# Patient Record
Sex: MALE | Race: WHITE | Employment: OTHER | URBAN - METROPOLITAN AREA
[De-identification: names, ages, dates, MRNs, and addresses within clinical notes are randomized per-mention and may not be internally consistent; named-entity substitution may affect disease eponyms.]

---

## 2017-02-06 ENCOUNTER — HOSPITAL ENCOUNTER (OUTPATIENT)
Dept: CT IMAGING | Age: 72
Discharge: HOME | End: 2017-02-06
Attending: NURSE PRACTITIONER
Payer: MEDICARE

## 2017-02-06 VITALS — HEIGHT: 68 IN | BODY MASS INDEX: 33.8 KG/M2 | WEIGHT: 223 LBS

## 2017-02-06 DIAGNOSIS — Z98.890 HISTORY OF LEFT-SIDED CAROTID ENDARTERECTOMY: Chronic | ICD-10-CM

## 2017-02-06 DIAGNOSIS — I65.21 ASYMPTOMATIC STENOSIS OF RIGHT CAROTID ARTERY WITHOUT INFARCTION: Chronic | ICD-10-CM

## 2017-02-06 LAB — CREAT BLD-MCNC: 1.2 MG/DL (ref 0.6–1)

## 2017-02-06 PROCEDURE — 74011000258 HC RX REV CODE- 258: Performed by: NURSE PRACTITIONER

## 2017-02-06 PROCEDURE — 70498 CT ANGIOGRAPHY NECK: CPT

## 2017-02-06 PROCEDURE — 74011636320 HC RX REV CODE- 636/320: Performed by: NURSE PRACTITIONER

## 2017-02-06 PROCEDURE — 82565 ASSAY OF CREATININE: CPT

## 2017-02-06 RX ORDER — SODIUM CHLORIDE 0.9 % (FLUSH) 0.9 %
10 SYRINGE (ML) INJECTION
Status: COMPLETED | OUTPATIENT
Start: 2017-02-06 | End: 2017-02-06

## 2017-02-06 RX ADMIN — IOPAMIDOL 80 ML: 755 INJECTION, SOLUTION INTRAVENOUS at 14:21

## 2017-02-06 RX ADMIN — Medication 10 ML: at 14:21

## 2017-02-06 RX ADMIN — SODIUM CHLORIDE 100 ML: 900 INJECTION, SOLUTION INTRAVENOUS at 14:21

## 2017-02-23 ENCOUNTER — ANESTHESIA EVENT (OUTPATIENT)
Dept: SURGERY | Age: 72
DRG: 039 | End: 2017-02-23
Payer: MEDICARE

## 2017-02-23 ENCOUNTER — HOSPITAL ENCOUNTER (OUTPATIENT)
Dept: SURGERY | Age: 72
Discharge: HOME | End: 2017-02-23
Payer: MEDICARE

## 2017-02-23 VITALS
HEART RATE: 69 BPM | DIASTOLIC BLOOD PRESSURE: 71 MMHG | WEIGHT: 224.5 LBS | OXYGEN SATURATION: 98 % | BODY MASS INDEX: 34.02 KG/M2 | HEIGHT: 68 IN | SYSTOLIC BLOOD PRESSURE: 102 MMHG | TEMPERATURE: 97.8 F | RESPIRATION RATE: 18 BRPM

## 2017-02-23 LAB
ANION GAP BLD CALC-SCNC: 12 MMOL/L (ref 7–16)
BUN SERPL-MCNC: 17 MG/DL (ref 8–23)
CALCIUM SERPL-MCNC: 9.7 MG/DL (ref 8.3–10.4)
CHLORIDE SERPL-SCNC: 100 MMOL/L (ref 98–107)
CO2 SERPL-SCNC: 29 MMOL/L (ref 21–32)
CREAT SERPL-MCNC: 1.25 MG/DL (ref 0.8–1.5)
ERYTHROCYTE [DISTWIDTH] IN BLOOD BY AUTOMATED COUNT: 12.4 % (ref 11.9–14.6)
GLUCOSE SERPL-MCNC: 89 MG/DL (ref 65–100)
HCT VFR BLD AUTO: 49.1 % (ref 41.1–50.3)
HGB BLD-MCNC: 17.8 G/DL (ref 13.6–17.2)
MCH RBC QN AUTO: 35 PG (ref 26.1–32.9)
MCHC RBC AUTO-ENTMCNC: 36.3 G/DL (ref 31.4–35)
MCV RBC AUTO: 96.5 FL (ref 79.6–97.8)
PLATELET # BLD AUTO: 162 K/UL (ref 150–450)
PMV BLD AUTO: 9 FL (ref 10.8–14.1)
POTASSIUM SERPL-SCNC: 4.2 MMOL/L (ref 3.5–5.1)
RBC # BLD AUTO: 5.09 M/UL (ref 4.23–5.67)
SODIUM SERPL-SCNC: 141 MMOL/L (ref 136–145)
WBC # BLD AUTO: 7.4 K/UL (ref 4.3–11.1)

## 2017-02-23 PROCEDURE — 85027 COMPLETE CBC AUTOMATED: CPT | Performed by: OBSTETRICS & GYNECOLOGY

## 2017-02-23 PROCEDURE — 36415 COLL VENOUS BLD VENIPUNCTURE: CPT | Performed by: OBSTETRICS & GYNECOLOGY

## 2017-02-23 PROCEDURE — 80048 BASIC METABOLIC PNL TOTAL CA: CPT | Performed by: OBSTETRICS & GYNECOLOGY

## 2017-02-23 NOTE — ANESTHESIA PREPROCEDURE EVALUATION
Anesthetic History   No history of anesthetic complications            Review of Systems / Medical History  Pertinent labs reviewed    Pulmonary        Sleep apnea: CPAP           Neuro/Psych              Cardiovascular    Hypertension: well controlled          Past MI, CAD, cardiac stents and CABG    Exercise tolerance: >4 METS  Comments: EF% 40 after last MI, 4 MI's in past   GI/Hepatic/Renal     GERD: well controlled           Endo/Other        Morbid obesity and arthritis     Other Findings              Physical Exam    Airway  Mallampati: II  TM Distance: > 6 cm  Neck ROM: normal range of motion   Mouth opening: Normal     Cardiovascular    Rhythm: regular           Dental  No notable dental hx       Pulmonary                 Abdominal         Other Findings            Anesthetic Plan    ASA: 3  Anesthesia type: general    Monitoring Plan: Arterial line      Induction: Intravenous  Anesthetic plan and risks discussed with: Patient

## 2017-02-23 NOTE — PERIOP NOTES
Patient verified name, , and surgery as listed in [facility]. TYPE  CASE:3  Orders per surgeon:  Received  Labs per surgeon:CBC,BMP, T/S: results pending   Labs per anesthesia protocol: CBC,BMP, T/S  : results pending   EKG  :  2016 , cath report 3/7/16, echo 3/7/16 all reviewed by Dr Laura Begum. Dr Sanchez Reasons in to see pt. Chart reviewed and pt approved for surgery. Called  Dr Sharmaine Damico nurse at [facility]. Message left for Plavix stop order and cardiac Clearance per Dr Vinayak Bray request. Pt states he has appointment with Dr Chio Escalante on 16. Patient provided with handouts including guide to surgery , transfusions, pain management and hand hygiene for the family and community. Pt verbalizes understanding of all pre-op instructions . Instructed that family must be present in building at all times. Hibiclens and instructions given per hospital policy. Instructed patient to continue  previous medications as prescribed prior to surgery and  to take the following medications the day of surgery according to anesthesia guidelines : ASA 81 mg, metoprolol, singular. Original medication prescription bottles not visualized during patient appointment. Continue all previous medications unless otherwise directed. Instructed patient to hold  the following medications prior to surgery: Plavix per order from Dr Chio Escalante. Dr Kwesi Bee office will need to notify patient. Multivitamin, Aleve, niacin, Fish oil. Patient verbalized understanding of all instructions and provided all medical/health information to the best of their ability.

## 2017-02-28 NOTE — PERIOP NOTES
Printed ov note/clearance from 2/27/17 from [facility] by Dr. Jorge Darby and placed on chart.

## 2017-03-02 ENCOUNTER — HOSPITAL ENCOUNTER (INPATIENT)
Age: 72
LOS: 1 days | Discharge: HOME | DRG: 039 | End: 2017-03-03
Attending: SURGERY | Admitting: SURGERY
Payer: MEDICARE

## 2017-03-02 ENCOUNTER — ANESTHESIA (OUTPATIENT)
Dept: SURGERY | Age: 72
DRG: 039 | End: 2017-03-02
Payer: MEDICARE

## 2017-03-02 PROBLEM — I65.21 CAROTID STENOSIS, RIGHT: Status: ACTIVE | Noted: 2017-03-02

## 2017-03-02 LAB
ABO + RH BLD: NORMAL
ANION GAP BLD CALC-SCNC: 8 MMOL/L (ref 7–16)
BLOOD GROUP ANTIBODIES SERPL: NORMAL
BUN SERPL-MCNC: 14 MG/DL (ref 8–23)
CALCIUM SERPL-MCNC: 8.5 MG/DL (ref 8.3–10.4)
CHLORIDE SERPL-SCNC: 109 MMOL/L (ref 98–107)
CO2 SERPL-SCNC: 27 MMOL/L (ref 21–32)
CREAT SERPL-MCNC: 1.14 MG/DL (ref 0.8–1.5)
ERYTHROCYTE [DISTWIDTH] IN BLOOD BY AUTOMATED COUNT: 12.4 % (ref 11.9–14.6)
GLUCOSE SERPL-MCNC: 118 MG/DL (ref 65–100)
HCT VFR BLD AUTO: 41.2 % (ref 41.1–50.3)
HGB BLD-MCNC: 14.6 G/DL (ref 13.6–17.2)
MAGNESIUM SERPL-MCNC: 2.1 MG/DL (ref 1.8–2.4)
MCH RBC QN AUTO: 33.5 PG (ref 26.1–32.9)
MCHC RBC AUTO-ENTMCNC: 35.4 G/DL (ref 31.4–35)
MCV RBC AUTO: 94.5 FL (ref 79.6–97.8)
PLATELET # BLD AUTO: 147 K/UL (ref 150–450)
PMV BLD AUTO: 8.7 FL (ref 10.8–14.1)
POTASSIUM SERPL-SCNC: 4 MMOL/L (ref 3.5–5.1)
RBC # BLD AUTO: 4.36 M/UL (ref 4.23–5.67)
SODIUM SERPL-SCNC: 144 MMOL/L (ref 136–145)
SPECIMEN EXP DATE BLD: NORMAL
WBC # BLD AUTO: 12 K/UL (ref 4.3–11.1)

## 2017-03-02 PROCEDURE — 74011250636 HC RX REV CODE- 250/636: Performed by: SURGERY

## 2017-03-02 PROCEDURE — 77030018824 HC SHNT CAR ARGY COVD -B: Performed by: SURGERY

## 2017-03-02 PROCEDURE — 77030014008 HC SPNG HEMSTAT J&J -C: Performed by: SURGERY

## 2017-03-02 PROCEDURE — 77030034850: Performed by: SURGERY

## 2017-03-02 PROCEDURE — 77030010507 HC ADH SKN DERMBND J&J -B: Performed by: SURGERY

## 2017-03-02 PROCEDURE — 03CH0ZZ EXTIRPATION OF MATTER FROM RIGHT COMMON CAROTID ARTERY, OPEN APPROACH: ICD-10-PCS | Performed by: SURGERY

## 2017-03-02 PROCEDURE — 77030002986 HC SUT PROL J&J -A: Performed by: SURGERY

## 2017-03-02 PROCEDURE — 77030031139 HC SUT VCRL2 J&J -A: Performed by: SURGERY

## 2017-03-02 PROCEDURE — 74011250636 HC RX REV CODE- 250/636

## 2017-03-02 PROCEDURE — 77030020782 HC GWN BAIR PAWS FLX 3M -B: Performed by: ANESTHESIOLOGY

## 2017-03-02 PROCEDURE — 03UH0KZ SUPPLEMENT RIGHT COMMON CAROTID ARTERY WITH NONAUTOLOGOUS TISSUE SUBSTITUTE, OPEN APPROACH: ICD-10-PCS | Performed by: SURGERY

## 2017-03-02 PROCEDURE — 03UK0KZ SUPPLEMENT RIGHT INTERNAL CAROTID ARTERY WITH NONAUTOLOGOUS TISSUE SUBSTITUTE, OPEN APPROACH: ICD-10-PCS | Performed by: SURGERY

## 2017-03-02 PROCEDURE — 77030002987 HC SUT PROL J&J -B: Performed by: SURGERY

## 2017-03-02 PROCEDURE — C1768 GRAFT, VASCULAR: HCPCS | Performed by: SURGERY

## 2017-03-02 PROCEDURE — 77030008477 HC STYL SATN SLP COVD -A: Performed by: ANESTHESIOLOGY

## 2017-03-02 PROCEDURE — 77010033678 HC OXYGEN DAILY

## 2017-03-02 PROCEDURE — 77030019908 HC STETH ESOPH SIMS -A: Performed by: ANESTHESIOLOGY

## 2017-03-02 PROCEDURE — 77030018673: Performed by: SURGERY

## 2017-03-02 PROCEDURE — 77030008703 HC TU ET UNCUF COVD -A: Performed by: ANESTHESIOLOGY

## 2017-03-02 PROCEDURE — 74011250636 HC RX REV CODE- 250/636: Performed by: ANESTHESIOLOGY

## 2017-03-02 PROCEDURE — 74011250637 HC RX REV CODE- 250/637: Performed by: SURGERY

## 2017-03-02 PROCEDURE — 76060000037 HC ANESTHESIA 3 TO 3.5 HR: Performed by: SURGERY

## 2017-03-02 PROCEDURE — 76010000173 HC OR TIME 3 TO 3.5 HR INTENSV-TIER 1: Performed by: SURGERY

## 2017-03-02 PROCEDURE — 03CM0ZZ EXTIRPATION OF MATTER FROM RIGHT EXTERNAL CAROTID ARTERY, OPEN APPROACH: ICD-10-PCS | Performed by: SURGERY

## 2017-03-02 PROCEDURE — 77030013292 HC BOWL MX PRSM J&J -A: Performed by: ANESTHESIOLOGY

## 2017-03-02 PROCEDURE — 74011000250 HC RX REV CODE- 250

## 2017-03-02 PROCEDURE — 03CK0ZZ EXTIRPATION OF MATTER FROM RIGHT INTERNAL CAROTID ARTERY, OPEN APPROACH: ICD-10-PCS | Performed by: SURGERY

## 2017-03-02 PROCEDURE — 77030013794 HC KT TRNSDUC BLD EDWD -B: Performed by: ANESTHESIOLOGY

## 2017-03-02 PROCEDURE — 77030010514 HC APPL CLP LIG COVD -B: Performed by: SURGERY

## 2017-03-02 PROCEDURE — 36600 WITHDRAWAL OF ARTERIAL BLOOD: CPT

## 2017-03-02 PROCEDURE — 85027 COMPLETE CBC AUTOMATED: CPT | Performed by: SURGERY

## 2017-03-02 PROCEDURE — 77030002933 HC SUT MCRYL J&J -A: Performed by: SURGERY

## 2017-03-02 PROCEDURE — 74011000250 HC RX REV CODE- 250: Performed by: SURGERY

## 2017-03-02 PROCEDURE — C1751 CATH, INF, PER/CENT/MIDLINE: HCPCS | Performed by: ANESTHESIOLOGY

## 2017-03-02 PROCEDURE — 80048 BASIC METABOLIC PNL TOTAL CA: CPT | Performed by: SURGERY

## 2017-03-02 PROCEDURE — 83735 ASSAY OF MAGNESIUM: CPT | Performed by: SURGERY

## 2017-03-02 PROCEDURE — 86900 BLOOD TYPING SEROLOGIC ABO: CPT | Performed by: SURGERY

## 2017-03-02 PROCEDURE — 74011250637 HC RX REV CODE- 250/637: Performed by: ANESTHESIOLOGY

## 2017-03-02 PROCEDURE — 77030034888 HC SUT PROL 2 J&J -B: Performed by: SURGERY

## 2017-03-02 PROCEDURE — 03UM0KZ SUPPLEMENT RIGHT EXTERNAL CAROTID ARTERY WITH NONAUTOLOGOUS TISSUE SUBSTITUTE, OPEN APPROACH: ICD-10-PCS | Performed by: SURGERY

## 2017-03-02 PROCEDURE — 77030002996 HC SUT SLK J&J -A: Performed by: SURGERY

## 2017-03-02 PROCEDURE — 65610000006 HC RM INTENSIVE CARE

## 2017-03-02 DEVICE — XENOSURE BIOLOGIC PATCH, 0.8CM X 8CM, EIFU
Type: IMPLANTABLE DEVICE | Site: NECK | Status: FUNCTIONAL
Brand: XENOSURE BIOLOGIC PATCH

## 2017-03-02 RX ORDER — LIDOCAINE HYDROCHLORIDE 10 MG/ML
INJECTION INFILTRATION; PERINEURAL AS NEEDED
Status: DISCONTINUED | OUTPATIENT
Start: 2017-03-02 | End: 2017-03-02 | Stop reason: HOSPADM

## 2017-03-02 RX ORDER — SPIRONOLACTONE 25 MG/1
25 TABLET ORAL DAILY
Status: DISCONTINUED | OUTPATIENT
Start: 2017-03-03 | End: 2017-03-03 | Stop reason: HOSPADM

## 2017-03-02 RX ORDER — SODIUM CHLORIDE 0.9 % (FLUSH) 0.9 %
5-10 SYRINGE (ML) INJECTION AS NEEDED
Status: DISCONTINUED | OUTPATIENT
Start: 2017-03-02 | End: 2017-03-02 | Stop reason: HOSPADM

## 2017-03-02 RX ORDER — ONDANSETRON 2 MG/ML
INJECTION INTRAMUSCULAR; INTRAVENOUS AS NEEDED
Status: DISCONTINUED | OUTPATIENT
Start: 2017-03-02 | End: 2017-03-02 | Stop reason: HOSPADM

## 2017-03-02 RX ORDER — NICARDIPINE HYDROCHLORIDE 0.1 MG/ML
5-15 INJECTION INTRAVENOUS
Status: DISCONTINUED | OUTPATIENT
Start: 2017-03-02 | End: 2017-03-03 | Stop reason: HOSPADM

## 2017-03-02 RX ORDER — MIDAZOLAM HYDROCHLORIDE 1 MG/ML
2 INJECTION, SOLUTION INTRAMUSCULAR; INTRAVENOUS ONCE
Status: DISCONTINUED | OUTPATIENT
Start: 2017-03-02 | End: 2017-03-02 | Stop reason: HOSPADM

## 2017-03-02 RX ORDER — SODIUM CHLORIDE, SODIUM LACTATE, POTASSIUM CHLORIDE, CALCIUM CHLORIDE 600; 310; 30; 20 MG/100ML; MG/100ML; MG/100ML; MG/100ML
100 INJECTION, SOLUTION INTRAVENOUS CONTINUOUS
Status: DISCONTINUED | OUTPATIENT
Start: 2017-03-02 | End: 2017-03-03 | Stop reason: HOSPADM

## 2017-03-02 RX ORDER — MIDAZOLAM HYDROCHLORIDE 1 MG/ML
2 INJECTION, SOLUTION INTRAMUSCULAR; INTRAVENOUS
Status: DISCONTINUED | OUTPATIENT
Start: 2017-03-02 | End: 2017-03-02 | Stop reason: HOSPADM

## 2017-03-02 RX ORDER — OXYCODONE AND ACETAMINOPHEN 7.5; 325 MG/1; MG/1
1 TABLET ORAL
Status: DISCONTINUED | OUTPATIENT
Start: 2017-03-02 | End: 2017-03-03 | Stop reason: HOSPADM

## 2017-03-02 RX ORDER — FENTANYL CITRATE 50 UG/ML
INJECTION, SOLUTION INTRAMUSCULAR; INTRAVENOUS AS NEEDED
Status: DISCONTINUED | OUTPATIENT
Start: 2017-03-02 | End: 2017-03-02 | Stop reason: HOSPADM

## 2017-03-02 RX ORDER — LIDOCAINE HYDROCHLORIDE 20 MG/ML
INJECTION, SOLUTION EPIDURAL; INFILTRATION; INTRACAUDAL; PERINEURAL AS NEEDED
Status: DISCONTINUED | OUTPATIENT
Start: 2017-03-02 | End: 2017-03-02 | Stop reason: HOSPADM

## 2017-03-02 RX ORDER — ACETAMINOPHEN 500 MG
500 TABLET ORAL
Status: DISCONTINUED | OUTPATIENT
Start: 2017-03-02 | End: 2017-03-03 | Stop reason: HOSPADM

## 2017-03-02 RX ORDER — ONDANSETRON 2 MG/ML
4 INJECTION INTRAMUSCULAR; INTRAVENOUS
Status: DISCONTINUED | OUTPATIENT
Start: 2017-03-02 | End: 2017-03-03 | Stop reason: HOSPADM

## 2017-03-02 RX ORDER — SODIUM CHLORIDE, SODIUM LACTATE, POTASSIUM CHLORIDE, CALCIUM CHLORIDE 600; 310; 30; 20 MG/100ML; MG/100ML; MG/100ML; MG/100ML
100 INJECTION, SOLUTION INTRAVENOUS CONTINUOUS
Status: DISCONTINUED | OUTPATIENT
Start: 2017-03-02 | End: 2017-03-02 | Stop reason: HOSPADM

## 2017-03-02 RX ORDER — SODIUM CHLORIDE 0.9 % (FLUSH) 0.9 %
5-10 SYRINGE (ML) INJECTION EVERY 8 HOURS
Status: DISCONTINUED | OUTPATIENT
Start: 2017-03-02 | End: 2017-03-02 | Stop reason: HOSPADM

## 2017-03-02 RX ORDER — FLUTICASONE PROPIONATE 50 MCG
2 SPRAY, SUSPENSION (ML) NASAL DAILY
Status: DISCONTINUED | OUTPATIENT
Start: 2017-03-03 | End: 2017-03-03 | Stop reason: HOSPADM

## 2017-03-02 RX ORDER — BUPIVACAINE HYDROCHLORIDE 2.5 MG/ML
INJECTION, SOLUTION EPIDURAL; INFILTRATION; INTRACAUDAL AS NEEDED
Status: DISCONTINUED | OUTPATIENT
Start: 2017-03-02 | End: 2017-03-02 | Stop reason: HOSPADM

## 2017-03-02 RX ORDER — OXYCODONE HYDROCHLORIDE 5 MG/1
5 TABLET ORAL
Status: DISCONTINUED | OUTPATIENT
Start: 2017-03-02 | End: 2017-03-02 | Stop reason: HOSPADM

## 2017-03-02 RX ORDER — DIPHENHYDRAMINE HYDROCHLORIDE 50 MG/ML
12.5 INJECTION, SOLUTION INTRAMUSCULAR; INTRAVENOUS
Status: DISCONTINUED | OUTPATIENT
Start: 2017-03-02 | End: 2017-03-02 | Stop reason: HOSPADM

## 2017-03-02 RX ORDER — HYDROCHLOROTHIAZIDE 25 MG/1
25 TABLET ORAL DAILY
Status: DISCONTINUED | OUTPATIENT
Start: 2017-03-03 | End: 2017-03-03 | Stop reason: HOSPADM

## 2017-03-02 RX ORDER — GUAIFENESIN 100 MG/5ML
81 LIQUID (ML) ORAL
Status: DISCONTINUED | OUTPATIENT
Start: 2017-03-02 | End: 2017-03-02 | Stop reason: SDUPTHER

## 2017-03-02 RX ORDER — HEPARIN SODIUM 5000 [USP'U]/ML
INJECTION, SOLUTION INTRAVENOUS; SUBCUTANEOUS AS NEEDED
Status: DISCONTINUED | OUTPATIENT
Start: 2017-03-02 | End: 2017-03-02 | Stop reason: HOSPADM

## 2017-03-02 RX ORDER — HEPARIN SODIUM 1000 [USP'U]/ML
INJECTION, SOLUTION INTRAVENOUS; SUBCUTANEOUS AS NEEDED
Status: DISCONTINUED | OUTPATIENT
Start: 2017-03-02 | End: 2017-03-02 | Stop reason: HOSPADM

## 2017-03-02 RX ORDER — ASPIRIN 81 MG/1
81 TABLET ORAL DAILY
Status: DISCONTINUED | OUTPATIENT
Start: 2017-03-03 | End: 2017-03-03 | Stop reason: HOSPADM

## 2017-03-02 RX ORDER — SODIUM CHLORIDE, SODIUM LACTATE, POTASSIUM CHLORIDE, CALCIUM CHLORIDE 600; 310; 30; 20 MG/100ML; MG/100ML; MG/100ML; MG/100ML
INJECTION, SOLUTION INTRAVENOUS
Status: DISCONTINUED | OUTPATIENT
Start: 2017-03-02 | End: 2017-03-02 | Stop reason: HOSPADM

## 2017-03-02 RX ORDER — PROPOFOL 10 MG/ML
INJECTION, EMULSION INTRAVENOUS AS NEEDED
Status: DISCONTINUED | OUTPATIENT
Start: 2017-03-02 | End: 2017-03-02 | Stop reason: HOSPADM

## 2017-03-02 RX ORDER — ROCURONIUM BROMIDE 10 MG/ML
INJECTION, SOLUTION INTRAVENOUS AS NEEDED
Status: DISCONTINUED | OUTPATIENT
Start: 2017-03-02 | End: 2017-03-02 | Stop reason: HOSPADM

## 2017-03-02 RX ORDER — NITROGLYCERIN 0.4 MG/1
0.4 TABLET SUBLINGUAL
Status: DISCONTINUED | OUTPATIENT
Start: 2017-03-02 | End: 2017-03-03 | Stop reason: HOSPADM

## 2017-03-02 RX ORDER — NALOXONE HYDROCHLORIDE 0.4 MG/ML
0.2 INJECTION, SOLUTION INTRAMUSCULAR; INTRAVENOUS; SUBCUTANEOUS AS NEEDED
Status: DISCONTINUED | OUTPATIENT
Start: 2017-03-02 | End: 2017-03-02 | Stop reason: HOSPADM

## 2017-03-02 RX ORDER — MAGNESIUM SULFATE 1 G/100ML
1 INJECTION INTRAVENOUS AS NEEDED
Status: DISCONTINUED | OUTPATIENT
Start: 2017-03-02 | End: 2017-03-03 | Stop reason: HOSPADM

## 2017-03-02 RX ORDER — FLUMAZENIL 0.1 MG/ML
0.2 INJECTION INTRAVENOUS
Status: DISCONTINUED | OUTPATIENT
Start: 2017-03-02 | End: 2017-03-02 | Stop reason: HOSPADM

## 2017-03-02 RX ORDER — CEFAZOLIN SODIUM IN 0.9 % NACL 2 G/50 ML
2 INTRAVENOUS SOLUTION, PIGGYBACK (ML) INTRAVENOUS ONCE
Status: COMPLETED | OUTPATIENT
Start: 2017-03-02 | End: 2017-03-02

## 2017-03-02 RX ORDER — FENTANYL CITRATE 50 UG/ML
100 INJECTION, SOLUTION INTRAMUSCULAR; INTRAVENOUS ONCE
Status: DISCONTINUED | OUTPATIENT
Start: 2017-03-02 | End: 2017-03-02 | Stop reason: HOSPADM

## 2017-03-02 RX ORDER — MORPHINE SULFATE 10 MG/ML
5 INJECTION, SOLUTION INTRAMUSCULAR; INTRAVENOUS
Status: DISCONTINUED | OUTPATIENT
Start: 2017-03-02 | End: 2017-03-03 | Stop reason: HOSPADM

## 2017-03-02 RX ORDER — HYDROMORPHONE HYDROCHLORIDE 1 MG/ML
1 INJECTION, SOLUTION INTRAMUSCULAR; INTRAVENOUS; SUBCUTANEOUS
Status: DISCONTINUED | OUTPATIENT
Start: 2017-03-02 | End: 2017-03-03 | Stop reason: HOSPADM

## 2017-03-02 RX ORDER — OXYCODONE HYDROCHLORIDE 5 MG/1
10 TABLET ORAL
Status: DISCONTINUED | OUTPATIENT
Start: 2017-03-02 | End: 2017-03-02 | Stop reason: HOSPADM

## 2017-03-02 RX ORDER — CEFAZOLIN SODIUM IN 0.9 % NACL 2 G/50 ML
2 INTRAVENOUS SOLUTION, PIGGYBACK (ML) INTRAVENOUS EVERY 8 HOURS
Status: COMPLETED | OUTPATIENT
Start: 2017-03-02 | End: 2017-03-03

## 2017-03-02 RX ORDER — ATROPA BELLADONNA AND OPIUM 16.2; 6 MG/1; MG/1
1 SUPPOSITORY RECTAL
Status: DISCONTINUED | OUTPATIENT
Start: 2017-03-02 | End: 2017-03-03 | Stop reason: HOSPADM

## 2017-03-02 RX ORDER — PROTAMINE SULFATE 10 MG/ML
INJECTION, SOLUTION INTRAVENOUS AS NEEDED
Status: DISCONTINUED | OUTPATIENT
Start: 2017-03-02 | End: 2017-03-02 | Stop reason: HOSPADM

## 2017-03-02 RX ORDER — MONTELUKAST SODIUM 10 MG/1
10 TABLET ORAL
Status: DISCONTINUED | OUTPATIENT
Start: 2017-03-02 | End: 2017-03-03 | Stop reason: HOSPADM

## 2017-03-02 RX ORDER — HYDROMORPHONE HYDROCHLORIDE 2 MG/ML
0.5 INJECTION, SOLUTION INTRAMUSCULAR; INTRAVENOUS; SUBCUTANEOUS
Status: DISCONTINUED | OUTPATIENT
Start: 2017-03-02 | End: 2017-03-02 | Stop reason: HOSPADM

## 2017-03-02 RX ORDER — SODIUM CHLORIDE 0.9 % (FLUSH) 0.9 %
5-10 SYRINGE (ML) INJECTION AS NEEDED
Status: DISCONTINUED | OUTPATIENT
Start: 2017-03-02 | End: 2017-03-03 | Stop reason: HOSPADM

## 2017-03-02 RX ORDER — BENZONATATE 100 MG/1
100 CAPSULE ORAL
Status: DISCONTINUED | OUTPATIENT
Start: 2017-03-02 | End: 2017-03-03 | Stop reason: HOSPADM

## 2017-03-02 RX ORDER — SODIUM CHLORIDE 0.9 % (FLUSH) 0.9 %
5-10 SYRINGE (ML) INJECTION EVERY 8 HOURS
Status: DISCONTINUED | OUTPATIENT
Start: 2017-03-02 | End: 2017-03-03 | Stop reason: HOSPADM

## 2017-03-02 RX ORDER — METOPROLOL TARTRATE 25 MG/1
25 TABLET, FILM COATED ORAL 2 TIMES DAILY
Status: DISCONTINUED | OUTPATIENT
Start: 2017-03-02 | End: 2017-03-03 | Stop reason: HOSPADM

## 2017-03-02 RX ORDER — ACETAMINOPHEN 500 MG
1000 TABLET ORAL ONCE
Status: COMPLETED | OUTPATIENT
Start: 2017-03-02 | End: 2017-03-02

## 2017-03-02 RX ORDER — DEXTROSE, SODIUM CHLORIDE, AND POTASSIUM CHLORIDE 5; .45; .15 G/100ML; G/100ML; G/100ML
125 INJECTION INTRAVENOUS CONTINUOUS
Status: DISCONTINUED | OUTPATIENT
Start: 2017-03-02 | End: 2017-03-03 | Stop reason: HOSPADM

## 2017-03-02 RX ORDER — NEOSTIGMINE METHYLSULFATE 1 MG/ML
INJECTION INTRAVENOUS AS NEEDED
Status: DISCONTINUED | OUTPATIENT
Start: 2017-03-02 | End: 2017-03-02 | Stop reason: HOSPADM

## 2017-03-02 RX ORDER — GLYCOPYRROLATE 0.2 MG/ML
INJECTION INTRAMUSCULAR; INTRAVENOUS AS NEEDED
Status: DISCONTINUED | OUTPATIENT
Start: 2017-03-02 | End: 2017-03-02 | Stop reason: HOSPADM

## 2017-03-02 RX ORDER — LIDOCAINE HYDROCHLORIDE 10 MG/ML
0.1 INJECTION INFILTRATION; PERINEURAL AS NEEDED
Status: DISCONTINUED | OUTPATIENT
Start: 2017-03-02 | End: 2017-03-02 | Stop reason: HOSPADM

## 2017-03-02 RX ORDER — NALOXONE HYDROCHLORIDE 0.4 MG/ML
0.4 INJECTION, SOLUTION INTRAMUSCULAR; INTRAVENOUS; SUBCUTANEOUS AS NEEDED
Status: DISCONTINUED | OUTPATIENT
Start: 2017-03-02 | End: 2017-03-03 | Stop reason: HOSPADM

## 2017-03-02 RX ORDER — DIPHENHYDRAMINE HCL 25 MG
50 CAPSULE ORAL
Status: DISCONTINUED | OUTPATIENT
Start: 2017-03-02 | End: 2017-03-03 | Stop reason: HOSPADM

## 2017-03-02 RX ADMIN — ONDANSETRON 4 MG: 2 INJECTION INTRAMUSCULAR; INTRAVENOUS at 11:00

## 2017-03-02 RX ADMIN — ROCURONIUM BROMIDE 50 MG: 10 INJECTION, SOLUTION INTRAVENOUS at 08:30

## 2017-03-02 RX ADMIN — FENTANYL CITRATE 25 MCG: 50 INJECTION, SOLUTION INTRAMUSCULAR; INTRAVENOUS at 09:33

## 2017-03-02 RX ADMIN — ROCURONIUM BROMIDE 5 MG: 10 INJECTION, SOLUTION INTRAVENOUS at 10:30

## 2017-03-02 RX ADMIN — SODIUM CHLORIDE, SODIUM LACTATE, POTASSIUM CHLORIDE, CALCIUM CHLORIDE: 600; 310; 30; 20 INJECTION, SOLUTION INTRAVENOUS at 09:00

## 2017-03-02 RX ADMIN — CEFAZOLIN 2 G: 1 INJECTION, POWDER, FOR SOLUTION INTRAMUSCULAR; INTRAVENOUS; PARENTERAL at 09:06

## 2017-03-02 RX ADMIN — PROPOFOL 200 MG: 10 INJECTION, EMULSION INTRAVENOUS at 08:30

## 2017-03-02 RX ADMIN — SODIUM CHLORIDE, SODIUM LACTATE, POTASSIUM CHLORIDE, AND CALCIUM CHLORIDE 100 ML/HR: 600; 310; 30; 20 INJECTION, SOLUTION INTRAVENOUS at 06:44

## 2017-03-02 RX ADMIN — Medication 10 ML: at 21:33

## 2017-03-02 RX ADMIN — HYDROMORPHONE HYDROCHLORIDE 1 MG: 1 INJECTION, SOLUTION INTRAMUSCULAR; INTRAVENOUS; SUBCUTANEOUS at 18:30

## 2017-03-02 RX ADMIN — Medication 10 ML: at 13:54

## 2017-03-02 RX ADMIN — GLYCOPYRROLATE 0.6 MG: 0.2 INJECTION INTRAMUSCULAR; INTRAVENOUS at 11:05

## 2017-03-02 RX ADMIN — MORPHINE SULFATE 5 MG: 10 INJECTION INTRAMUSCULAR; INTRAVENOUS; SUBCUTANEOUS at 13:53

## 2017-03-02 RX ADMIN — PROTAMINE SULFATE 50 MG: 10 INJECTION, SOLUTION INTRAVENOUS at 10:52

## 2017-03-02 RX ADMIN — SODIUM CHLORIDE, SODIUM LACTATE, POTASSIUM CHLORIDE, AND CALCIUM CHLORIDE: 600; 310; 30; 20 INJECTION, SOLUTION INTRAVENOUS at 10:55

## 2017-03-02 RX ADMIN — METOPROLOL TARTRATE 25 MG: 25 TABLET ORAL at 18:29

## 2017-03-02 RX ADMIN — OXYCODONE HYDROCHLORIDE AND ACETAMINOPHEN 1 TABLET: 7.5; 325 TABLET ORAL at 16:05

## 2017-03-02 RX ADMIN — FENTANYL CITRATE 100 MCG: 50 INJECTION, SOLUTION INTRAMUSCULAR; INTRAVENOUS at 08:30

## 2017-03-02 RX ADMIN — OXYCODONE HYDROCHLORIDE AND ACETAMINOPHEN 1 TABLET: 7.5; 325 TABLET ORAL at 23:41

## 2017-03-02 RX ADMIN — ACETAMINOPHEN 1000 MG: 500 TABLET, FILM COATED ORAL at 13:53

## 2017-03-02 RX ADMIN — MONTELUKAST SODIUM 10 MG: 10 TABLET, FILM COATED ORAL at 21:30

## 2017-03-02 RX ADMIN — FENTANYL CITRATE 50 MCG: 50 INJECTION, SOLUTION INTRAMUSCULAR; INTRAVENOUS at 09:21

## 2017-03-02 RX ADMIN — DEXTROSE MONOHYDRATE, SODIUM CHLORIDE, AND POTASSIUM CHLORIDE 125 ML/HR: 50; 4.5; 1.49 INJECTION, SOLUTION INTRAVENOUS at 13:52

## 2017-03-02 RX ADMIN — FENTANYL CITRATE 25 MCG: 50 INJECTION, SOLUTION INTRAMUSCULAR; INTRAVENOUS at 09:44

## 2017-03-02 RX ADMIN — NEOSTIGMINE METHYLSULFATE 4 MG: 1 INJECTION INTRAVENOUS at 11:05

## 2017-03-02 RX ADMIN — ROCURONIUM BROMIDE 10 MG: 10 INJECTION, SOLUTION INTRAVENOUS at 09:17

## 2017-03-02 RX ADMIN — FLUTICASONE PROPIONATE 2 SPRAY: 50 SPRAY, METERED NASAL at 18:29

## 2017-03-02 RX ADMIN — LIDOCAINE HYDROCHLORIDE 100 MG: 20 INJECTION, SOLUTION EPIDURAL; INFILTRATION; INTRACAUDAL; PERINEURAL at 08:30

## 2017-03-02 RX ADMIN — DEXTROSE MONOHYDRATE, SODIUM CHLORIDE, AND POTASSIUM CHLORIDE 125 ML/HR: 50; 4.5; 1.49 INJECTION, SOLUTION INTRAVENOUS at 22:25

## 2017-03-02 RX ADMIN — HEPARIN SODIUM 10000 UNITS: 1000 INJECTION, SOLUTION INTRAVENOUS; SUBCUTANEOUS at 09:57

## 2017-03-02 RX ADMIN — CEFAZOLIN 2 G: 1 INJECTION, POWDER, FOR SOLUTION INTRAMUSCULAR; INTRAVENOUS; PARENTERAL at 17:19

## 2017-03-02 NOTE — OP NOTES
[facility] 63   OPERATIVE REPORT       Name:  Aleksey Blackburn   MR#:  [id number]   :  1945   Account #:  [de-identified]   Date of Adm:  2017       DATE OF SURGERY: 2017    PREOPERATIVE DIAGNOSIS: Right internal carotid artery stenosis. POSTOPERATIVE DIAGNOSIS: Right internal carotid artery stenosis. NAME OF PROCEDURE: Right carotid endarterectomy. SURGEON: Javi Cooper. ASSISTANT: Priscilla Lim MD.    ANESTHESIA: General with local supplement. ESTIMATED BLOOD LOSS: 75 mL. SPECIMEN: None. STATEMENT OF MEDICAL NECESSITY: The patient is a 61-year-old man   who has previously undergone left carotid endarterectomy and   subsequent surveillance ultrasound shows severe progression of   stenosis on the right. This was recently confirmed with a CT   arteriogram that shows 90% right internal carotid artery   stenosis and no significant stenosis on the left. PROCEDURE: The patient was taken to the operating room and a   general anesthetic was administered. The right side of the neck   and the upper chest were prepped and draped in the usual sterile   fashion. An [place] cover was used. The skin was anesthetized over   the right sternocleidomastoid muscle and a standard longitudinal   cervical incision was made. The carotid sheath was exposed and   the common facial vein was divided between ligatures of 2-0 silk   reinforced with clips. The common internal and external carotid   arteries were carefully dissected and surrounded with umbilical   tapes. The vagus and hypoglossal nerves were carefully   identified and preserved. Heparin 100 units per kilogram body weight was given   intravenously and then 2 minutes later the internal, then the   external and then the common carotid arteries were occluded with   Rumels.  A longitudinal arteriotomy was made in the common   carotid artery with a #12 blade and then extended with Smiley   scissors through the plaque and into the normal distal right   internal carotid artery. The plaque was noted to be severely   stenotic and very heterogeneous with the calcified and soft   elements. Next, a 10-[place] shunt was gently inserted   into the internal carotid artery, back bled and then placed into   the common carotid artery. Patency of the shunt was confirmed   with continuous wave Doppler. The endarterectomy plane was   started in the deep layer of arterial wall media using an   elevator. The proximal endpoint was sharply transected, flushed   with the common carotid artery. The external carotid was cleared   with a standard eversion technique. The distal endpoint was   partially taken sharply with a curved microscissors flush with   the normal distal intima and then the rest of the endpoint was   feathered off. The distal endpoint was then secured and tacked   down with several interrupted 6-0 Prolene sutures. All loose   shards of atheromatous debris were then carefully removed with   microforceps. The endarterectomy surface was thoroughly irrigated with   heparinized saline to ensure completeness. The arteriotomy was   then repaired with a standard bovine pericardium patch,   anastomosed with 2 running 6-0 Prolene sutures. Before   completing the patch closure, the shunt was divided and then   removed from the internal and then the common carotid artery. The external was then briefly released to backbleed the   bifurcation area. The surface of the endarterectomy was then   thoroughly irrigated with heparinized saline. When the   anastomosis was completed, the external was again released to   backbleed the bifurcation and flow was first restored into the   external carotid artery by releasing the Rumel from the common   carotid artery. Several heartbeats later, the Rumel was released   from the right internal carotid artery.  At completion, there was   a normal palpable pulse, a normal external appearance, and a   normal continuous wave Doppler signal at the internal, external   and common carotid arteries, including where the Rumels had been   applied. Protamine was then given to reverse the remaining   heparin. The incision was thoroughly irrigated. Fibrillar was   used to assist with topical hemostasis. The incision was then closed in layers with running 3-0 Vicryl   in the carotid sheath, running 3-0 Vicryl in the platysma and   running 4-0 subcuticular Monocryl in the skin. Dermabond was   applied to the skin incision. The patient tolerated the procedure well and went to recovery in   stable condition.         MD Viri MccloudV[facility] / [facility]   D:  03/02/2017   11:22   T:  03/02/2017   15:10   Job #:  [id number]

## 2017-03-02 NOTE — PROGRESS NOTES
TRANSFER - IN REPORT:    Verbal report received from [facility] CRNA(name) on Justine Alcantar  being received from OR(unit) for routine progression of care      Report consisted of patients Situation, Background, Assessment and   Recommendations(SBAR). Information from the following report(s) SBAR, Kardex, OR Summary, Procedure Summary, Intake/Output, MAR, Recent Results and Cardiac Rhythm NSR was reviewed with the receiving nurse. Opportunity for questions and clarification was provided. Assessment completed upon patients arrival to unit and care assumed.

## 2017-03-02 NOTE — IP AVS SNAPSHOT
[facility] 
 
 
 2[address] [facility] 322 [facility] 
[phone] Patient: Maricarmen Serrano MRN: [id number] FRANCES:[birth year] You are allergic to the following Allergen Reactions Other Medication Other (comments) Seasonal allergies and swimming in lake water cause sinus congestion. Statins-Hmg-Coa Reductase Inhibitors Unknown (comments) Joint pain Recent Documentation Height  
  
  
  
  
  
 1.727 m Emergency Contacts Name Discharge Info Relation Home Work Mobile [facility] CAREGIVER [3] Spouse [3] [phone] About your hospitalization You were admitted on:  March 2, 2017 You last received care in the:  [facility] You were discharged on:  March 3, 2017 Unit phone number:  [phone] Why you were hospitalized Your primary diagnosis was:  Carotid Stenosis, Right Providers Seen During Your Hospitalizations Provider Role Specialty Primary office phone Rell Cheng MD Attending Provider Vascular Surgery [phone] Your Primary Care Physician (PCP) Primary Care Physician Office Phone Office Fax Vinod walden, [address] [place] [address] [phone] Follow-up Information Follow up With Details Comments Contact Info Jing Mcclain MD   [facility] [place] [address] [id number] 
[phone] Rell Cheng MD On 3/20/2017 at 2:00 PM [place] [address] [facility] [address] 
[phone] Jing Mcclain MD On 3/9/2017 at 11:45 AM [facility] [place] [phone] 
[phone] Your Appointments Monday March 20, 2017  2:00 PM EDT Global Post Op with Barbara Serrano NP  
[facility] [address] [place] [id number] [phone] Current Discharge Medication List  
  
 START taking these medications Dose & Instructions Dispensing Information Comments Morning Noon Evening Bedtime  
 oxyCODONE-acetaminophen 7.5-325 mg per tablet Commonly known as:  PERCOCET Your next dose is: Today, Tomorrow Other:  _________ Dose:  1 Tab Take 1 Tab by mouth every six (6) hours as needed for Pain. Max Daily Amount: 4 Tabs. Indications: Pain Quantity:  20 Tab Refills:  0 CONTINUE these medications which have NOT CHANGED Dose & Instructions Dispensing Information Comments Morning Noon Evening Bedtime ALEVE 220 mg Cap Generic drug:  naproxen sodium Your next dose is: Today, Tomorrow Other:  _________ Dose:  1 Tab Take 1 Tab by mouth every six (6) hours as needed. Refills:  0  
     
   
   
   
  
 aspirin 81 mg chewable tablet Your next dose is: Today, Tomorrow Other:  _________ Dose:  81 mg Take 81 mg by mouth nightly. Refills:  0  
     
   
   
   
  
 benzonatate 100 mg capsule Commonly known as:  TESSALON Your next dose is: Today, Tomorrow Other:  _________ Dose:  100 mg Take 100 mg by mouth three (3) times daily as needed for Cough. Refills:  0  
     
   
   
   
  
 clopidogrel 75 mg Tab Commonly known as:  PLAVIX Your next dose is: Today, Tomorrow Other:  _________ Dose:  75 mg Take 1 Tab by mouth daily. Quantity:  90 Tab Refills:  3  
     
   
   
   
  
 cpap machine kit Your next dose is: Today, Tomorrow Other:  _________ 40 cm qhs Refills:  0  
     
   
   
   
  
 fluticasone 50 mcg/actuation nasal spray Commonly known as:  Shelvia Robynks Your next dose is: Today, Tomorrow Other:  _________ Dose:  2 Spray 2 Sprays by Both Nostrils route daily. Refills:  0  
     
   
   
   
  
 hydroCHLOROthiazide 25 mg tablet Commonly known as:  HYDRODIURIL Your next dose is: Today, Tomorrow Other:  _________ Dose:  25 mg Take 1 Tab by mouth daily. Quantity:  90 Tab Refills:  3  
     
   
   
   
  
 metoprolol tartrate 25 mg tablet Commonly known as:  LOPRESSOR Your next dose is: Today, Tomorrow Other:  _________ Dose:  25 mg Take 1 Tab by mouth two (2) times a day. Quantity:  180 Tab Refills:  3  
     
   
   
   
  
 montelukast 10 mg tablet Commonly known as:  SINGULAIR Your next dose is: Today, Tomorrow Other:  _________ TAKE ONE TABLET BY MOUTH ONCE DAILY IN THE EVENING Quantity:  30 Tab Refills:  0  
     
   
   
   
  
 multivitamin tablet Commonly known as:  ONE A DAY Your next dose is: Today, Tomorrow Other:  _________ Dose:  1 Tab Take 1 Tab by mouth daily. Refills:  0  
     
   
   
   
  
 nitroglycerin 0.4 mg SL tablet Commonly known as:  NITROSTAT Your next dose is: Today, Tomorrow Other:  _________ Dose:  0.4 mg  
1 Tab by SubLINGual route every five (5) minutes as needed for Chest Pain. Quantity:  25 Tab Refills:  11 Omega-3-DHA-EPA-Fish Oil 1,000 mg (120 mg-180 mg) Cap Your next dose is: Today, Tomorrow Other:  _________ Dose:  1 Tab Take 1 Tab by mouth daily. Refills:  0 SLO-NIACIN 500 mg Tber Generic drug:  niacin ER Your next dose is: Today, Tomorrow Other:  _________ Take  by mouth. Refills:  0  
     
   
   
   
  
 spironolactone 25 mg tablet Commonly known as:  ALDACTONE Your next dose is: Today, Tomorrow Other:  _________ Dose:  25 mg Take 1 Tab by mouth daily. Quantity:  90 Tab Refills:  3 Where to Get Your Medications Information on where to get these meds will be given to you by the nurse or doctor. ! Ask your nurse or doctor about these medications  
  oxyCODONE-acetaminophen 7.5-325 mg per tablet Discharge Instructions DISCHARGE SUMMARY from Nurse The following personal items are in your possession at time of discharge: 
 
Dental Appliances: None Visual Aid: None Home Medications: None Jewelry: None Clothing: Footwear, Pants, Shirt Other Valuables: None PATIENT INSTRUCTIONS: 
 
 
F-face looks uneven A-arms unable to move or move unevenly S-speech slurred or non-existent T-time-call 911 as soon as signs and symptoms begin-DO NOT go Back to bed or wait to see if you get better-TIME IS BRAIN. Warning Signs of HEART ATTACK Call 911 if you have these symptoms: 
? Chest discomfort. Most heart attacks involve discomfort in the center of the chest that lasts more than a few minutes, or that goes away and comes back. It can feel like uncomfortable pressure, squeezing, fullness, or pain. ? Discomfort in other areas of the upper body. Symptoms can include pain or discomfort in one or both arms, the back, neck, jaw, or stomach. ? Shortness of breath with or without chest discomfort. ? Other signs may include breaking out in a cold sweat, nausea, or lightheadedness. Don't wait more than five minutes to call [address]! Fast action can save your life. Calling 911 is almost always the fastest way to get lifesaving treatment. Emergency Medical Services staff can begin treatment when they arrive  up to an hour sooner than if someone gets to the hospital by car. The discharge information has been reviewed with the patient. The patient verbalized understanding. Discharge medications reviewed with the patient and appropriate educational materials and side effects teaching were provided. Resume Plavix (clopidogrel) in 2 days. Carotid Endarterectomy: What to Expect at [facility] Your Recovery A carotid endarterectomy (say \"hugo-RAW-tid vz-klx-sro-REK-tuh-luis e\") is surgery to remove fatty build-up (plaque) from one of the carotid arteries. There are two carotid arteriesone on each side of the neckthat supply blood to the brain. When plaque builds up in either artery, it can make it hard for blood to flow to the brain. This surgery may lower your risk of having a stroke. You may have a sore throat for a few days. You can expect the cut (incision) in your neck to be sore for about a week. The area around the incision may also be swollen and bruised at first. The area in front of the incision may be numb. This usually gets better after 6 to 12 months. Your doctor closed the incision in your neck with stitches. The stitches will be removed 7 to 10 days after surgery, or you may have stitches that dissolve on their own. You may feel more tired than usual for several weeks after surgery. You will probably be able to go back to work or your usual activities in 1 to 2 weeks. This care sheet gives you a general idea about how long it will take for you to recover. But each person recovers at a different pace. Follow the steps below to feel better as quickly as possible. How can you care for yourself at home? Activity · Rest when you feel tired. Getting enough sleep will help you recover. · Try to walk each day. Start by walking a little more than you did the day before. Bit by bit, increase the amount you walk. Walking boosts blood flow and helps prevent pneumonia and constipation. · Avoid strenuous activities, such as bicycle riding, jogging, weight lifting, or aerobic exercise. Your doctor will tell you when it's okay to do strenuous activity. · For 1 to 2 weeks, avoid lifting anything that would make you strain. This may include a child, heavy grocery bags and milk containers, a heavy briefcase or backpack, cat litter or dog food bags, or a vacuum . · Ask your doctor when you can drive again. · You will probably need to take 1 to 2 weeks off from work. It depends on the type of work you do and how you feel. · You may shower and take baths as usual. But do not soak the incision for the first 2 weeks, or until your doctor tells you it is okay. Pat the incision dry. · Your doctor will tell you when you can have sex again. Diet · You can eat your normal diet. If your stomach is upset, try bland, low-fat foods like plain rice, broiled chicken, toast, and yogurt. · Drink plenty of fluids (unless your doctor tells you not to). · You may notice that your bowel movements are not regular right after your surgery. This is common. Try to avoid constipation and straining with bowel movements. You may want to take a fiber supplement every day. If you have not had a bowel movement after a couple of days, ask your doctor about taking a mild laxative. Medicines · Your doctor will tell you if and when you can restart your medicines. He or she will also give you instructions about taking any new medicines. · If you take blood thinners, such as warfarin (Coumadin), clopidogrel (Plavix), or aspirin, be sure to talk to your doctor. He or she will tell you if and when to start taking those medicines again. Make sure that you understand exactly what your doctor wants you to do. · Your doctor may advise you to take aspirin when you go home. This helps prevent blood clots. Take your medicines exactly as prescribed. Call your doctor if you think you are having a problem with your medicine. · Be safe with medicines. Take pain medicines exactly as directed. ¨ If the doctor gave you a prescription medicine for pain, take it as prescribed. ¨ If you are not taking a prescription pain medicine, ask your doctor if you can take an over-the-counter medicine. ¨ Do not take two or more pain medicines at the same time unless the doctor told you to. Many pain medicines have acetaminophen, which is Tylenol. Too much acetaminophen (Tylenol) can be harmful. · If you think your pain medicine is making you sick to your stomach: 
¨ Take your medicine after meals (unless your doctor has told you not to). ¨ Ask your doctor for a different pain medicine. · If your doctor prescribed antibiotics, take them as directed. Do not stop taking them just because you feel better. You need to take the full course of antibiotics. · If you take a blood thinner, such as aspirin, be sure you get instructions about how to take your medicine safely. Blood thinners can cause serious bleeding problems. Incision care · If you have strips of tape over your incision, leave the tape on for a week or until it falls off. · Wash the area daily with water and pat it dry. Other cleaning products, such as hydrogen peroxide, can make the wound heal more slowly. You may cover the area with a gauze bandage if it weeps or rubs against clothing. Change the bandage every day. · Keep the area clean and dry. Follow-up care is a key part of your treatment and safety. Be sure to make and go to all appointments, and call your doctor if you are having problems. It's also a good idea to know your test results and keep a list of the medicines you take. When should you call for help? Call 911 anytime you think you may need emergency care. For example, call if: 
· You passed out (lost consciousness). · You have severe trouble breathing. · You have a tight bulge in your neck on the side where the surgery was done. · You have symptoms of a stroke. These may include: 
¨ Sudden numbness, tingling, weakness, or loss of movement in your face, arm, or leg, especially on only one side of your body. ¨ Sudden vision changes. ¨ Sudden trouble speaking. ¨ Sudden confusion or trouble understanding simple statements. ¨ Sudden problems with walking or balance. ¨ A sudden, severe headache that is different from past headaches. · You have chest pain or pressure. This may occur with: ¨ Sweating. ¨ Shortness of breath. ¨ Nausea or vomiting. ¨ Pain that spreads from the chest to the neck, jaw, or one or both shoulders or arms. ¨ Dizziness or lightheadedness. ¨ A fast or uneven pulse. After calling 911, chew 1 adult-strength or 2 to 4 low-dose aspirin. Wait for an ambulance. Do not try to drive yourself. Call your doctor now or seek immediate medical care if: 
· You are sick to your stomach or cannot keep fluids down. · You have pain that does not get better after you take pain medicine. · You have a fever over 100°F. 
· You have loose stitches, or your incision comes open. · Bright red blood has soaked through the bandage over your incision. · You have signs of infection, such as: 
¨ Increased pain, swelling, warmth, or redness. ¨ Red streaks leading from the incisions. ¨ Pus draining from the incisions. ¨ Swollen lymph nodes in your neck, armpits, or groin. ¨ A fever. Watch closely for any changes in your health, and be sure to contact your doctor if you have any problems. Where can you learn more? Go to http://arturo-sherice.info/. Enter Z493 in the search box to learn more about \"Carotid Endarterectomy: What to Expect at Home. \" Current as of: January 27, 2016 Content Version: 11.1 © 3145-9039 [employer]. Care instructions adapted under license by [employer] (which disclaims liability or warranty for this information). If you have questions about a medical condition or this instruction, always ask your healthcare professional. [address] any warranty or liability for your use of this information. Heart-Healthy Diet: Care Instructions Your Care Instructions A heart-healthy diet has lots of vegetables, fruits, nuts, beans, and whole grains, and is low in salt. It limits foods that are high in saturated fat, such as meats, cheeses, and fried foods. It may be hard to change your diet, but even small changes can lower your risk of heart attack and heart disease. Follow-up care is a key part of your treatment and safety. Be sure to make and go to all appointments, and call your doctor if you are having problems. It's also a good idea to know your test results and keep a list of the medicines you take. How can you care for yourself at home? Watch your portions · Learn what a serving is. A \"serving\" and a \"portion\" are not always the same thing. Make sure that you are not eating larger portions than are recommended. For example, a serving of pasta is ½ cup. A serving size of meat is 2 to 3 ounces. A 3-ounce serving is about the size of a deck of cards. Measure serving sizes until you are good at [place]" them. Keep in mind that restaurants often serve portions that are 2 or 3 times the size of one serving. · To keep your energy level up and keep you from feeling hungry, eat often but in smaller portions. · Eat only the number of calories you need to stay at a healthy weight. If you need to lose weight, eat fewer calories than your body burns (through exercise and other physical activity). Eat more fruits and vegetables · Eat a variety of fruit and vegetables every day. Dark green, deep orange, red, or yellow fruits and vegetables are especially good for you. Examples include spinach, carrots, peaches, and berries. · Keep carrots, celery, and other veggies handy for snacks. Buy fruit that is in season and store it where you can see it so that you will be tempted to eat it. · Cook dishes that have a lot of veggies in them, such as stir-fries and soups. Limit saturated and trans fat · Read food labels, and try to avoid saturated and trans fats. They increase your risk of heart disease. Trans fat is found in many processed foods such as cookies and crackers. · Use olive or canola oil when you cook. Try cholesterol-lowering spreads, such as Benecol or Take Control. · Bake, broil, grill, or steam foods instead of frying them. · Choose lean meats instead of high-fat meats such as hot dogs and sausages. Cut off all visible fat when you prepare meat. · Eat fish, skinless poultry, and meat alternatives such as soy products instead of high-fat meats. Soy products, such as tofu, may be especially good for your heart. · Choose low-fat or fat-free milk and dairy products. Eat fish · Eat at least two servings of fish a week. Certain fish, such as salmon and tuna, contain omega-3 fatty acids, which may help reduce your risk of heart attack. Eat foods high in fiber · Eat a variety of grain products every day. Include whole-grain foods that have lots of fiber and nutrients. Examples of whole-grain foods include oats, whole wheat bread, and brown rice. · Buy whole-grain breads and cereals, instead of white bread or pastries. Limit salt and sodium · Limit how much salt and sodium you eat to help lower your blood pressure. · Taste food before you salt it. Add only a little salt when you think you need it. With time, your taste buds will adjust to less salt. · Eat fewer snack items, fast foods, and other high-salt, processed foods. Check food labels for the amount of sodium in packaged foods. · Choose low-sodium versions of canned goods (such as soups, vegetables, and beans). Limit sugar · Limit drinks and foods with added sugar. These include candy, desserts, and soda pop. Limit alcohol · Limit alcohol to no more than 2 drinks a day for men and 1 drink a day for women. Too much alcohol can cause health problems. When should you call for help? Watch closely for changes in your health, and be sure to contact your doctor if: 
· You would like help planning heart-healthy meals. Where can you learn more? Go to http://arturo-sherice.info/. Enter V137 in the search box to learn more about \"Heart-Healthy Diet: Care Instructions. \" Current as of: January 27, 2016 Content Version: 11.1 © 3980-4695 [employer]. Care instructions adapted under license by [employer] (which disclaims liability or warranty for this information). If you have questions about a medical condition or this instruction, always ask your healthcare professional. [place] [address] any warranty or liability for your use of this information. Pain Medicine Side Effects: Care Instructions Your Care Instructions When you go to a medical facility in pain, you may get a strong medicine to give you relief. The medicine may be given in a vein (by IV) or as an injection (shot). Examples of this type of pain medicine include fentanyl, hydromorphone, and morphine. While these medicines help relieve pain, they also have side effects. For your safety, it's important that you know how this strong pain medicine affects you. Common side effects can include: 
· Nausea or vomiting. · Feeling dizzy or lightheaded. · Feeling sleepy. The doctor has checked you carefully, but problems can develop later. If you notice any problems or new symptoms, get medical treatment right away. Follow-up care is a key part of your treatment and safety. Be sure to make and go to all appointments, and call your doctor if you are having problems. It's also a good idea to know your test results and keep a list of the medicines you take. How can you care for yourself at home? Activity · Don't do anything for 24 hours that requires attention to detail. This medicine makes your mind foggy. It takes time for the effects to wear off completely. · Don't drive a car until you are sure the effects from the medicine are gone. Medicines · Be safe with medicines. Read and follow all instructions on the label. ¨ If the doctor gave you a prescription medicine for pain, take it as prescribed. ¨ If you are not taking a prescription pain medicine, ask your doctor if you can take an over-the-counter medicine. Diet · You can eat your normal diet, unless your doctor gives you other instructions. If your stomach is upset, try clear liquids and bland, low-fat foods like plain toast or rice. · Drink plenty of fluids (unless your doctor tells you not to). · Don't drink alcohol for 24 hours. When should you call for help? Call 911 anytime you think you may need emergency care. For example, call if: 
· You have trouble breathing. · You passed out (lost consciousness). Call your doctor now or seek immediate medical care if: 
· You have new or worse pain. Watch closely for changes in your health, and be sure to contact your doctor if: 
· You do not get better as expected. Where can you learn more? Go to http://arturo-sherice.info/. Enter G910 in the search box to learn more about \"Pain Medicine Side Effects: Care Instructions. \" Current as of: February 19, 2016 Content Version: 11.1 © 6208-0257 [employer]. Care instructions adapted under license by [employer] (which disclaims liability or warranty for this information). If you have questions about a medical condition or this instruction, always ask your healthcare professional. [place] [address] any warranty or liability for your use of this information. Stopping Smoking: Care Instructions Your Care Instructions Cigarette smokers crave the nicotine in cigarettes. Giving it up is much harder than simply changing a habit. Your body has to stop craving the nicotine. It is hard to quit, but you can do it.  There are many tools that people use to quit smoking. You may find that combining tools works best for you. There are several steps to quitting. First you get ready to quit. Then you get support to help you. After that, you learn new skills and behaviors to become a nonsmoker. For many people, a necessary step is getting and using medicine. Your doctor will help you set up the plan that best meets your needs. You may want to attend a smoking cessation program to help you quit smoking. When you choose a program, look for one that has proven success. Ask your doctor for ideas. You will greatly increase your chances of success if you take medicine as well as get counseling or join a cessation program. 
Some of the changes you feel when you first quit tobacco are uncomfortable. Your body will miss the nicotine at first, and you may feel short-tempered and grumpy. You may have trouble sleeping or concentrating. Medicine can help you deal with these symptoms. You may struggle with changing your smoking habits and rituals. The last step is the tricky one: Be prepared for the smoking urge to continue for a time. This is a lot to deal with, but keep at it. You will feel better. Follow-up care is a key part of your treatment and safety. Be sure to make and go to all appointments, and call your doctor if you are having problems. Its also a good idea to know your test results and keep a list of the medicines you take. How can you care for yourself at home? · Ask your family, friends, and coworkers for support. You have a better chance of quitting if you have help and support. · Join a support group, such as Nicotine Anonymous, for people who are trying to quit smoking. · Consider signing up for a smoking cessation program, such as the American Lung Association's Freedom from Smoking program. 
· Set a quit date. Pick your date carefully so that it is not right in the middle of a big deadline or stressful time.  Once you quit, do not even take a puff. Get rid of all ashtrays and lighters after your last cigarette. Clean your house and your clothes so that they do not smell of smoke. · Learn how to be a nonsmoker. Think about ways you can avoid those things that make you reach for a cigarette. ¨ Avoid situations that put you at greatest risk for smoking. For some people, it is hard to have a drink with friends without smoking. For others, they might skip a coffee break with coworkers who smoke. ¨ Change your daily routine. Take a different route to work or eat a meal in a different place. · Cut down on stress. Calm yourself or release tension by doing an activity you enjoy, such as reading a book, taking a hot bath, or gardening. · Talk to your doctor or pharmacist about nicotine replacement therapy, which replaces the nicotine in your body. You still get nicotine but you do not use tobacco. Nicotine replacement products help you slowly reduce the amount of nicotine you need. These products come in several forms, many of them available over-the-counter: ¨ Nicotine patches ¨ Nicotine gum and lozenges ¨ Nicotine inhaler · Ask your doctor about bupropion (Wellbutrin) or varenicline (Chantix), which are prescription medicines. They do not contain nicotine. They help you by reducing withdrawal symptoms, such as stress and anxiety. · Some people find hypnosis, acupuncture, and massage helpful for ending the smoking habit. · Eat a healthy diet and get regular exercise. Having healthy habits will help your body move past its craving for nicotine. · Be prepared to keep trying. Most people are not successful the first few times they try to quit. Do not get mad at yourself if you smoke again. Make a list of things you learned and think about when you want to try again, such as next week, next month, or next year. Where can you learn more? Go to http://arturo-sherice.info/. Enter B079 in the search box to learn more about \"Stopping Smoking: Care Instructions. \" Current as of: May 26, 2016 Content Version: 11.1 © 8310-0766 [employer]. Care instructions adapted under license by [employer] (which disclaims liability or warranty for this information). If you have questions about a medical condition or this instruction, always ask your healthcare professional. [address] any warranty or liability for your use of this information. Discharge Orders None ACO Transitions of Care Introducing [facility] Evelia Louis offers a voluntary care coordination program to provide high quality service and care to [facility] fee-for-service beneficiaries. Victor Hugotammy Jhony was designed to help you enhance your health and well-being through the following services: ? Transitions of Care  support for individuals who are transitioning from one care setting to another (example: Hospital to home). ? Chronic and Complex Care Coordination  support for individuals and caregivers of those with serious or chronic illnesses or with more than one chronic (ongoing) condition and those who take a number of different medications. If you meet specific medical criteria, a [address] [place] [address] Rd may call you directly to coordinate your care with your primary care physician and your other care providers. For questions about the [facility] programs, please, contact your physicians office. For general questions or additional information about Accountable Care Organizations: 
Please visit www.medicare.gov/acos. html or call 1-800-MEDICARE (8-408.848.9069) TTY users should call 5-257.651.8171. Introducing [facility]!    
 Venkat Horn introduces [employer] patient portal. Now you can access parts of your medical record, email your doctor's office, and request medication refills online. 1. In your internet browser, go to [email or web address] 2. Click on the First Time User? Click Here link in the Sign In box. You will see the New Member Sign Up page. 3. Enter your [employer] Access Code exactly as it appears below. You will not need to use this code after youve completed the sign-up process. If you do not sign up before the expiration date, you must request a new code. · [employer] Access Code: [email or web address] Expires: 5/1/2017  2:24 PM 
 
4. Enter the last four digits of your Social Security Number (xxxx) and Date of Birth (mm/dd/yyyy) as indicated and click Submit. You will be taken to the next sign-up page. 5. Create a [employer] ID. This will be your [employer] login ID and cannot be changed, so think of one that is secure and easy to remember. 6. Create a [employer] password. You can change your password at any time. 7. Enter your Password Reset Question and Answer. This can be used at a later time if you forget your password. 8. Enter your e-mail address. You will receive e-mail notification when new information is available in [address] 9. Click Sign Up. You can now view and download portions of your medical record. 10. Click the Download Summary menu link to download a portable copy of your medical information. If you have questions, please visit the Frequently Asked Questions section of the [employer] website. Remember, [employer] is NOT to be used for urgent needs. For medical emergencies, dial 911. Now available from your iPhone and Android! General Information Please provide this summary of care documentation to your next provider. Patient Signature:  ____________________________________________________________ Date:  ____________________________________________________________  
  
Paulene Greener Provider Signature:  ____________________________________________________________ Date:  ____________________________________________________________

## 2017-03-02 NOTE — ANESTHESIA POSTPROCEDURE EVALUATION
Post-Anesthesia Evaluation and Assessment    Patient: Therese Parmar MRN: [id number]  SSN: [id number]    YOB: 1945  Age: 66 y.o. Sex: male       Cardiovascular Function/Vital Signs  Visit Vitals    /72    Pulse 90    Temp 37.3 °C (99.1 °F)    Resp 16    Ht 5' 8\" (1.727 m)    Wt 103 kg (227 lb)    SpO2 100%    BMI 34.52 kg/m2       Patient is status post general anesthesia for Procedure(s):  CAROTID  ENDARTERECTOMY RIGHT. Nausea/Vomiting: None    Postoperative hydration reviewed and adequate. Pain:  Pain Scale 1: Numeric (0 - 10) (03/02/17 1147)  Pain Intensity 1: 0 (03/02/17 1147)   Managed    Neurological Status:   Neuro (WDL): Within Defined Limits (03/02/17 7187)  Neuro  Neurologic State: Alert (03/02/17 1147)  Orientation Level: Oriented X4 (03/02/17 1147)  Cognition: Appropriate decision making; Appropriate safety awareness; Follows commands (03/02/17 1147)  Speech: Clear (03/02/17 1147)  Assessment L Pupil: Brisk;Round (03/02/17 1147)  Size L Pupil (mm): 2 (03/02/17 1147)  Assessment R Pupil: Brisk;Round (03/02/17 1147)  Size R Pupil (mm): 2 (03/02/17 1147)  LUE Motor Response: Purposeful (03/02/17 1147)  LLE Motor Response: Purposeful (03/02/17 1147)  RUE Motor Response: Purposeful (03/02/17 1147)  RLE Motor Response: Purposeful (03/02/17 1147)   At baseline    Mental Status and Level of Consciousness: Arousable    Pulmonary Status:   O2 Device: Nasal cannula (03/02/17 1150)   Adequate oxygenation and airway patent    Complications related to anesthesia: None    Post-anesthesia assessment completed.  No concerns    Signed By: Lucien Webb MD     March 2, 2017

## 2017-03-02 NOTE — BRIEF OP NOTE
BRIEF OPERATIVE NOTE    Date of Procedure: 3/2/2017   Preoperative Diagnosis: Carotid stenosis, non-symptomatic, right   Postoperative Diagnosis: Carotid stenosis, non-symptomatic, right    Procedure(s):  CAROTID  ENDARTERECTOMY RIGHT  Surgeon(s) and Role:     * Reena Graham MD - Primary     * Lucy Castro MD - Assisting        Surgical Staff:  Circ-1: Marika Guallpa RN  Circ-2: Duane Savage RN  Scrub Tech-1: Adams Jones  Scrub Tech-2: Serenity Hankins  Scrub Tech-3: Felicita Nayak  Event Time In   Incision Start 0919   Incision Close      Anesthesia: General   Estimated Blood Loss: 75 mL  Specimens: * No specimens in log *   Findings: Severe SILVINO stenosis, normal completion Doppler signals. Complications: none  Implants:   Implant Name Type Inv.  Item Serial No.  Lot No. LRB No. Used [facility] VASC PERIPATCH [id number] -- Zollie Mylar - [id number]   PATCH VASC PERIPATCH [id number] -- [employer] [place] [employer] [id number] 1 Implanted

## 2017-03-02 NOTE — PROGRESS NOTES
Dual skin assessment completed with RN. Allevyn dressing placed for blanchable redness to acral area. Right neck incision clean, dry, and well-approximated.

## 2017-03-02 NOTE — ROUTINE PROCESS
TRANSFER - OUT REPORT:    Verbal report given to [facility] RN(name) on Gibran Limb  being transferred to [facility](unit) for routine progression of care       Report consisted of patients Situation, Background, Assessment and   Recommendations(SBAR). Information from the following report(s) SBAR and OR Summary was reviewed with the receiving nurse. Lines:   Peripheral IV 03/02/17 Left Hand (Active)   Site Assessment Clean, dry, & intact 3/2/2017  6:00 AM   Phlebitis Assessment 0 3/2/2017  6:00 AM   Infiltration Assessment 0 3/2/2017  6:00 AM   Dressing Status Clean, dry, & intact 3/2/2017  6:00 AM   Dressing Type Transparent 3/2/2017  6:00 AM   Hub Color/Line Status Green; Infusing 3/2/2017  6:00 AM   Action Taken Blood drawn 3/2/2017  6:00 AM       Peripheral IV 03/02/17 Right Hand (Active)       Arterial Line 03/02/17 Left Radial artery (Active)        Opportunity for questions and clarification was provided.       Patient transported with:   Monitor  O2 @ 4 liters  Registered Nurse   CRNA

## 2017-03-02 NOTE — ANESTHESIA PROCEDURE NOTES
Arterial Line Placement    Start time: 3/2/2017 8:30 AM  End time: 3/2/2017 8:52 AM  Performed by: Juan Bradley  Authorized by: Michelene Kehr D     Pre-Procedure  Indications:  Arterial pressure monitoring and blood sampling  Preanesthetic Checklist: patient identified, risks and benefits discussed, anesthesia consent, site marked, patient being monitored, timeout performed and patient being monitored    Timeout Time: 08:30        Procedure:   Prep:  Chlorhexidine  Seldinger Technique?: No    Orientation:  Left  Location:  Radial artery  Catheter size:  20 G  Number of attempts:  3 or more    Assessment:   Post-procedure:  Line secured and sterile dressing applied  Patient Tolerance:  Patient tolerated the procedure well with no immediate complications  Comment:   U/S used

## 2017-03-03 VITALS
HEART RATE: 67 BPM | OXYGEN SATURATION: 95 % | RESPIRATION RATE: 18 BRPM | SYSTOLIC BLOOD PRESSURE: 117 MMHG | TEMPERATURE: 97.3 F | WEIGHT: 227 LBS | HEIGHT: 68 IN | BODY MASS INDEX: 34.4 KG/M2 | DIASTOLIC BLOOD PRESSURE: 54 MMHG

## 2017-03-03 LAB — MAGNESIUM SERPL-MCNC: 2.2 MG/DL (ref 1.8–2.4)

## 2017-03-03 PROCEDURE — 74011250636 HC RX REV CODE- 250/636: Performed by: SURGERY

## 2017-03-03 PROCEDURE — 74011250637 HC RX REV CODE- 250/637: Performed by: SURGERY

## 2017-03-03 RX ORDER — OXYCODONE AND ACETAMINOPHEN 7.5; 325 MG/1; MG/1
1 TABLET ORAL
Qty: 20 TAB | Refills: 0 | Status: SHIPPED | OUTPATIENT
Start: 2017-03-03 | End: 2018-04-04

## 2017-03-03 RX ADMIN — Medication 10 ML: at 06:12

## 2017-03-03 RX ADMIN — FLUTICASONE PROPIONATE 2 SPRAY: 50 SPRAY, METERED NASAL at 08:57

## 2017-03-03 RX ADMIN — ONDANSETRON 4 MG: 2 INJECTION INTRAMUSCULAR; INTRAVENOUS at 01:19

## 2017-03-03 RX ADMIN — ASPIRIN 81 MG: 81 TABLET, COATED ORAL at 08:57

## 2017-03-03 RX ADMIN — CEFAZOLIN 2 G: 1 INJECTION, POWDER, FOR SOLUTION INTRAMUSCULAR; INTRAVENOUS; PARENTERAL at 00:37

## 2017-03-03 NOTE — PROGRESS NOTES
Discharge instructions given to patient including Percocet Rx. All questions answered at this time. Patient stable. Will continue to monitor until transportation home arrives.

## 2017-03-03 NOTE — DISCHARGE INSTRUCTIONS
DISCHARGE SUMMARY from Nurse    The following personal items are in your possession at time of discharge:    Dental Appliances: None  Visual Aid: None     Home Medications: None  Jewelry: None  Clothing: Footwear, Pants, Shirt  Other Valuables: None             PATIENT INSTRUCTIONS:    After general anesthesia or intravenous sedation, for 24 hours or while taking prescription Narcotics:  · Limit your activities  · Do not drive and operate hazardous machinery  · Do not make important personal or business decisions  · Do  not drink alcoholic beverages  · If you have not urinated within 8 hours after discharge, please contact your surgeon on call. Report the following to your surgeon:  · Excessive pain, swelling, redness or odor of or around the surgical area  · Temperature over 100.5  · Nausea and vomiting lasting longer than 4 hours or if unable to take medications  · Any signs of decreased circulation or nerve impairment to extremity: change in color, persistent  numbness, tingling, coldness or increase pain  · Any questions        What to do at Home:  Recommended activity: Activity as tolerated and Activity as tolerated      *  Please give a list of your current medications to your Primary Care Provider. *  Please update this list whenever your medications are discontinued, doses are      changed, or new medications (including over-the-counter products) are added. *  Please carry medication information at all times in case of emergency situations. These are general instructions for a healthy lifestyle:    No smoking/ No tobacco products/ Avoid exposure to second hand smoke    Surgeon General's Warning:  Quitting smoking now greatly reduces serious risk to your health.     Obesity, smoking, and sedentary lifestyle greatly increases your risk for illness    A healthy diet, regular physical exercise & weight monitoring are important for maintaining a healthy lifestyle    You may be retaining fluid if you have a history of heart failure or if you experience any of the following symptoms:  Weight gain of 3 pounds or more overnight or 5 pounds in a week, increased swelling in our hands or feet or shortness of breath while lying flat in bed. Please call your doctor as soon as you notice any of these symptoms; do not wait until your next office visit. Recognize signs and symptoms of STROKE:    F-face looks uneven    A-arms unable to move or move unevenly    S-speech slurred or non-existent    T-time-call 911 as soon as signs and symptoms begin-DO NOT go       Back to bed or wait to see if you get better-TIME IS BRAIN. Warning Signs of HEART ATTACK     Call 911 if you have these symptoms:   Chest discomfort. Most heart attacks involve discomfort in the center of the chest that lasts more than a few minutes, or that goes away and comes back. It can feel like uncomfortable pressure, squeezing, fullness, or pain.  Discomfort in other areas of the upper body. Symptoms can include pain or discomfort in one or both arms, the back, neck, jaw, or stomach.  Shortness of breath with or without chest discomfort.  Other signs may include breaking out in a cold sweat, nausea, or lightheadedness. Don't wait more than five minutes to call 911 - MINUTES MATTER! Fast action can save your life. Calling 911 is almost always the fastest way to get lifesaving treatment. Emergency Medical Services staff can begin treatment when they arrive -- up to an hour sooner than if someone gets to the hospital by car. The discharge information has been reviewed with the patient. The patient verbalized understanding. Discharge medications reviewed with the patient and appropriate educational materials and side effects teaching were provided. Resume Plavix (clopidogrel) in 2 days.        Carotid Endarterectomy: What to Expect at [address] carotid endarterectomy (say \"kuh-RAW-tid tt-xoc-jbb-REK-tuh-luis e\") is surgery to remove fatty build-up (plaque) from one of the carotid arteries. There are two carotid arteries--one on each side of the neck--that supply blood to the brain. When plaque builds up in either artery, it can make it hard for blood to flow to the brain. This surgery may lower your risk of having a stroke. You may have a sore throat for a few days. You can expect the cut (incision) in your neck to be sore for about a week. The area around the incision may also be swollen and bruised at first. The area in front of the incision may be numb. This usually gets better after 6 to 12 months. Your doctor closed the incision in your neck with stitches. The stitches will be removed 7 to 10 days after surgery, or you may have stitches that dissolve on their own. You may feel more tired than usual for several weeks after surgery. You will probably be able to go back to work or your usual activities in 1 to 2 weeks. This care sheet gives you a general idea about how long it will take for you to recover. But each person recovers at a different pace. Follow the steps below to feel better as quickly as possible. How can you care for yourself at home? Activity  · Rest when you feel tired. Getting enough sleep will help you recover. · Try to walk each day. Start by walking a little more than you did the day before. Bit by bit, increase the amount you walk. Walking boosts blood flow and helps prevent pneumonia and constipation. · Avoid strenuous activities, such as bicycle riding, jogging, weight lifting, or aerobic exercise. Your doctor will tell you when it's okay to do strenuous activity. · For 1 to 2 weeks, avoid lifting anything that would make you strain. This may include a child, heavy grocery bags and milk containers, a heavy briefcase or backpack, cat litter or dog food bags, or a vacuum . · Ask your doctor when you can drive again.   · You will probably need to take 1 to 2 weeks off from work. It depends on the type of work you do and how you feel. · You may shower and take baths as usual. But do not soak the incision for the first 2 weeks, or until your doctor tells you it is okay. Pat the incision dry. · Your doctor will tell you when you can have sex again. Diet  · You can eat your normal diet. If your stomach is upset, try bland, low-fat foods like plain rice, broiled chicken, toast, and yogurt. · Drink plenty of fluids (unless your doctor tells you not to). · You may notice that your bowel movements are not regular right after your surgery. This is common. Try to avoid constipation and straining with bowel movements. You may want to take a fiber supplement every day. If you have not had a bowel movement after a couple of days, ask your doctor about taking a mild laxative. Medicines  · Your doctor will tell you if and when you can restart your medicines. He or she will also give you instructions about taking any new medicines. · If you take blood thinners, such as warfarin (Coumadin), clopidogrel (Plavix), or aspirin, be sure to talk to your doctor. He or she will tell you if and when to start taking those medicines again. Make sure that you understand exactly what your doctor wants you to do. · Your doctor may advise you to take aspirin when you go home. This helps prevent blood clots. Take your medicines exactly as prescribed. Call your doctor if you think you are having a problem with your medicine. · Be safe with medicines. Take pain medicines exactly as directed. ¨ If the doctor gave you a prescription medicine for pain, take it as prescribed. ¨ If you are not taking a prescription pain medicine, ask your doctor if you can take an over-the-counter medicine. ¨ Do not take two or more pain medicines at the same time unless the doctor told you to. Many pain medicines have acetaminophen, which is Tylenol. Too much acetaminophen (Tylenol) can be harmful.   · If you think your pain medicine is making you sick to your stomach:  ¨ Take your medicine after meals (unless your doctor has told you not to). ¨ Ask your doctor for a different pain medicine. · If your doctor prescribed antibiotics, take them as directed. Do not stop taking them just because you feel better. You need to take the full course of antibiotics. · If you take a blood thinner, such as aspirin, be sure you get instructions about how to take your medicine safely. Blood thinners can cause serious bleeding problems. Incision care  · If you have strips of tape over your incision, leave the tape on for a week or until it falls off. · Wash the area daily with water and pat it dry. Other cleaning products, such as hydrogen peroxide, can make the wound heal more slowly. You may cover the area with a gauze bandage if it weeps or rubs against clothing. Change the bandage every day. · Keep the area clean and dry. Follow-up care is a key part of your treatment and safety. Be sure to make and go to all appointments, and call your doctor if you are having problems. It's also a good idea to know your test results and keep a list of the medicines you take. When should you call for help? Call 911 anytime you think you may need emergency care. For example, call if:  · You passed out (lost consciousness). · You have severe trouble breathing. · You have a tight bulge in your neck on the side where the surgery was done. · You have symptoms of a stroke. These may include:  ¨ Sudden numbness, tingling, weakness, or loss of movement in your face, arm, or leg, especially on only one side of your body. ¨ Sudden vision changes. ¨ Sudden trouble speaking. ¨ Sudden confusion or trouble understanding simple statements. ¨ Sudden problems with walking or balance. ¨ A sudden, severe headache that is different from past headaches. · You have chest pain or pressure. This may occur with:  ¨ Sweating.   ¨ Shortness of breath. ¨ Nausea or vomiting. ¨ Pain that spreads from the chest to the neck, jaw, or one or both shoulders or arms. ¨ Dizziness or lightheadedness. ¨ A fast or uneven pulse. After calling 911, chew 1 adult-strength or 2 to 4 low-dose aspirin. Wait for an ambulance. Do not try to drive yourself. Call your doctor now or seek immediate medical care if:  · You are sick to your stomach or cannot keep fluids down. · You have pain that does not get better after you take pain medicine. · You have a fever over 100°F.  · You have loose stitches, or your incision comes open. · Bright red blood has soaked through the bandage over your incision. · You have signs of infection, such as:  ¨ Increased pain, swelling, warmth, or redness. ¨ Red streaks leading from the incisions. ¨ Pus draining from the incisions. ¨ Swollen lymph nodes in your neck, armpits, or groin. ¨ A fever. Watch closely for any changes in your health, and be sure to contact your doctor if you have any problems. Where can you learn more? Go to http://arturo-sherice.info/. Enter C536 in the search box to learn more about \"Carotid Endarterectomy: What to Expect at Home. \"  Current as of: January 27, 2016  Content Version: 11.1  © 9982-9315 [employer]. Care instructions adapted under license by [employer] (which disclaims liability or warranty for this information). If you have questions about a medical condition or this instruction, always ask your healthcare professional. [place] [address] any warranty or liability for your use of this information. Heart-Healthy Diet: Care Instructions  Your Care Instructions    A heart-healthy diet has lots of vegetables, fruits, nuts, beans, and whole grains, and is low in salt. It limits foods that are high in saturated fat, such as meats, cheeses, and fried foods.  It may be hard to change your diet, but even small changes can lower your risk of heart attack and heart disease. Follow-up care is a key part of your treatment and safety. Be sure to make and go to all appointments, and call your doctor if you are having problems. It's also a good idea to know your test results and keep a list of the medicines you take. How can you care for yourself at home? Watch your portions  · Learn what a serving is. A \"serving\" and a \"portion\" are not always the same thing. Make sure that you are not eating larger portions than are recommended. For example, a serving of pasta is ½ cup. A serving size of meat is 2 to 3 ounces. A 3-ounce serving is about the size of a deck of cards. Measure serving sizes until you are good at [place]" them. Keep in mind that restaurants often serve portions that are 2 or 3 times the size of one serving. · To keep your energy level up and keep you from feeling hungry, eat often but in smaller portions. · Eat only the number of calories you need to stay at a healthy weight. If you need to lose weight, eat fewer calories than your body burns (through exercise and other physical activity). Eat more fruits and vegetables  · Eat a variety of fruit and vegetables every day. Dark green, deep orange, red, or yellow fruits and vegetables are especially good for you. Examples include spinach, carrots, peaches, and berries. · Keep carrots, celery, and other veggies handy for snacks. Buy fruit that is in season and store it where you can see it so that you will be tempted to eat it. · Cook dishes that have a lot of veggies in them, such as stir-fries and soups. Limit saturated and trans fat  · Read food labels, and try to avoid saturated and trans fats. They increase your risk of heart disease. Trans fat is found in many processed foods such as cookies and crackers. · Use olive or canola oil when you cook. Try cholesterol-lowering spreads, such as Benecol or Take Control.   · Bake, broil, grill, or steam foods instead of frying them.  · Choose lean meats instead of high-fat meats such as hot dogs and sausages. Cut off all visible fat when you prepare meat. · Eat fish, skinless poultry, and meat alternatives such as soy products instead of high-fat meats. Soy products, such as tofu, may be especially good for your heart. · Choose low-fat or fat-free milk and dairy products. Eat fish  · Eat at least two servings of fish a week. Certain fish, such as salmon and tuna, contain omega-3 fatty acids, which may help reduce your risk of heart attack. Eat foods high in fiber  · Eat a variety of grain products every day. Include whole-grain foods that have lots of fiber and nutrients. Examples of whole-grain foods include oats, whole wheat bread, and brown rice. · Buy whole-grain breads and cereals, instead of white bread or pastries. Limit salt and sodium  · Limit how much salt and sodium you eat to help lower your blood pressure. · Taste food before you salt it. Add only a little salt when you think you need it. With time, your taste buds will adjust to less salt. · Eat fewer snack items, fast foods, and other high-salt, processed foods. Check food labels for the amount of sodium in packaged foods. · Choose low-sodium versions of canned goods (such as soups, vegetables, and beans). Limit sugar  · Limit drinks and foods with added sugar. These include candy, desserts, and soda pop. Limit alcohol  · Limit alcohol to no more than 2 drinks a day for men and 1 drink a day for women. Too much alcohol can cause health problems. When should you call for help? Watch closely for changes in your health, and be sure to contact your doctor if:  · You would like help planning heart-healthy meals. Where can you learn more? Go to http://arturo-sherice.info/. Enter V137 in the search box to learn more about \"Heart-Healthy Diet: Care Instructions. \"  Current as of: January 27, 2016  Content Version: 11.1  © 9444-9979 [employer]. Care instructions adapted under license by [employer] (which disclaims liability or warranty for this information). If you have questions about a medical condition or this instruction, always ask your healthcare professional. [address] any warranty or liability for your use of this information. Pain Medicine Side Effects: Care Instructions  Your Care Instructions  When you go to a medical facility in pain, you may get a strong medicine to give you relief. The medicine may be given in a vein (by IV) or as an injection (shot). Examples of this type of pain medicine include fentanyl, hydromorphone, and morphine. While these medicines help relieve pain, they also have side effects. For your safety, it's important that you know how this strong pain medicine affects you. Common side effects can include:  · Nausea or vomiting. · Feeling dizzy or lightheaded. · Feeling sleepy. The doctor has checked you carefully, but problems can develop later. If you notice any problems or new symptoms, get medical treatment right away. Follow-up care is a key part of your treatment and safety. Be sure to make and go to all appointments, and call your doctor if you are having problems. It's also a good idea to know your test results and keep a list of the medicines you take. How can you care for yourself at home? Activity  · Don't do anything for 24 hours that requires attention to detail. This medicine makes your mind foggy. It takes time for the effects to wear off completely. · Don't drive a car until you are sure the effects from the medicine are gone. Medicines  · Be safe with medicines. Read and follow all instructions on the label. ¨ If the doctor gave you a prescription medicine for pain, take it as prescribed. ¨ If you are not taking a prescription pain medicine, ask your doctor if you can take an over-the-counter medicine.   Diet  · You can eat your normal diet, unless your doctor gives you other instructions. If your stomach is upset, try clear liquids and bland, low-fat foods like plain toast or rice. · Drink plenty of fluids (unless your doctor tells you not to). · Don't drink alcohol for 24 hours. When should you call for help? Call 911 anytime you think you may need emergency care. For example, call if:  · You have trouble breathing. · You passed out (lost consciousness). Call your doctor now or seek immediate medical care if:  · You have new or worse pain. Watch closely for changes in your health, and be sure to contact your doctor if:  · You do not get better as expected. Where can you learn more? Go to http://arturo-sherice.info/. Enter G910 in the search box to learn more about \"Pain Medicine Side Effects: Care Instructions. \"  Current as of: February 19, 2016  Content Version: 11.1  © 1728-2936 [employer]. Care instructions adapted under license by [employer] (which disclaims liability or warranty for this information). If you have questions about a medical condition or this instruction, always ask your healthcare professional. [place] [address] any warranty or liability for your use of this information. Stopping Smoking: Care Instructions  Your Care Instructions  Cigarette smokers crave the nicotine in cigarettes. Giving it up is much harder than simply changing a habit. Your body has to stop craving the nicotine. It is hard to quit, but you can do it. There are many tools that people use to quit smoking. You may find that combining tools works best for you. There are several steps to quitting. First you get ready to quit. Then you get support to help you. After that, you learn new skills and behaviors to become a nonsmoker. For many people, a necessary step is getting and using medicine. Your doctor will help you set up the plan that best meets your needs.  You may want to attend a smoking cessation program to help you quit smoking. When you choose a program, look for one that has proven success. Ask your doctor for ideas. You will greatly increase your chances of success if you take medicine as well as get counseling or join a cessation program.  Some of the changes you feel when you first quit tobacco are uncomfortable. Your body will miss the nicotine at first, and you may feel short-tempered and grumpy. You may have trouble sleeping or concentrating. Medicine can help you deal with these symptoms. You may struggle with changing your smoking habits and rituals. The last step is the tricky one: Be prepared for the smoking urge to continue for a time. This is a lot to deal with, but keep at it. You will feel better. Follow-up care is a key part of your treatment and safety. Be sure to make and go to all appointments, and call your doctor if you are having problems. Its also a good idea to know your test results and keep a list of the medicines you take. How can you care for yourself at home? · Ask your family, friends, and coworkers for support. You have a better chance of quitting if you have help and support. · Join a support group, such as Nicotine Anonymous, for people who are trying to quit smoking. · Consider signing up for a smoking cessation program, such as the American Lung Association's Freedom from Smoking program.  · Set a quit date. Pick your date carefully so that it is not right in the middle of a big deadline or stressful time. Once you quit, do not even take a puff. Get rid of all ashtrays and lighters after your last cigarette. Clean your house and your clothes so that they do not smell of smoke. · Learn how to be a nonsmoker. Think about ways you can avoid those things that make you reach for a cigarette. ¨ Avoid situations that put you at greatest risk for smoking. For some people, it is hard to have a drink with friends without smoking.  For others, they might skip a coffee break with coworkers who smoke. ¨ Change your daily routine. Take a different route to work or eat a meal in a different place. · Cut down on stress. Calm yourself or release tension by doing an activity you enjoy, such as reading a book, taking a hot bath, or gardening. · Talk to your doctor or pharmacist about nicotine replacement therapy, which replaces the nicotine in your body. You still get nicotine but you do not use tobacco. Nicotine replacement products help you slowly reduce the amount of nicotine you need. These products come in several forms, many of them available over-the-counter:  ¨ Nicotine patches  ¨ Nicotine gum and lozenges  ¨ Nicotine inhaler  · Ask your doctor about bupropion (Wellbutrin) or varenicline (Chantix), which are prescription medicines. They do not contain nicotine. They help you by reducing withdrawal symptoms, such as stress and anxiety. · Some people find hypnosis, acupuncture, and massage helpful for ending the smoking habit. · Eat a healthy diet and get regular exercise. Having healthy habits will help your body move past its craving for nicotine. · Be prepared to keep trying. Most people are not successful the first few times they try to quit. Do not get mad at yourself if you smoke again. Make a list of things you learned and think about when you want to try again, such as next week, next month, or next year. Where can you learn more? Go to http://arturo-sherice.info/. Enter N169 in the search box to learn more about \"Stopping Smoking: Care Instructions. \"  Current as of: May 26, 2016  Content Version: 11.1  © 8866-8365 [employer]. Care instructions adapted under license by [employer] (which disclaims liability or warranty for this information).  If you have questions about a medical condition or this instruction, always ask your healthcare professional. Adry Mancilla disclaims any warranty or liability for your use of this information.

## 2017-03-03 NOTE — PROGRESS NOTES
Pt's left radial art line removed at this time. Manual pressure held until hemostasis achieved. No bleeding or hematoma at site. Pressure dressing to site. Will monitor. Pt's ya removed at this time. Ya removed without difficulty. Pt instructed on need to void post ya removal. Pt v/u. Urinal given to Pt. Pt tolerated both procedures well. No acute distress noted. VSS throughout. Pt placed on NIBP Q 15min. Will monitor.

## 2017-03-03 NOTE — PROGRESS NOTES
VASCULAR SURGERY ICU PROGRESS NOTE    Admit Date: 3/2/2017  POD: 1 Day Post-Op    Procedure:  Procedure(s):  CAROTID  ENDARTERECTOMY RIGHT    Subjective:     Patient has incisional pain. ROS - unchanged except as noted above. Objective:     Vitals:  Blood pressure 103/52, pulse 64, temperature 97.3 °F (36.3 °C), resp. rate 18, height 5' 8\" (1.727 m), weight 227 lb (103 kg), SpO2 93 %. Temp (24hrs), Av.3 °F (36.8 °C), Min:97.3 °F (36.3 °C), Max:99.1 °F (37.3 °C)      Intake / Output:      1901 -  0700  In: 3167 [P.O.:120; I.V.:3047]  Out:  [Urine:1890]    Physical Exam:    GEN: alert, cooperative, no distress, appears stated age  HEART: regular rate and rhythm, S1, S2 normal, no murmur, click, rub or gallop  NEURO: negative  Incision:  neck  no hematoma    Labs:   Recent Labs      17   1340   HGB  14.6   WBC  12.0*   K  4.0   GLU  118*       Data Review reviewed  Nursing documentation and I & O    Assessment:     Patient Active Problem List    Diagnosis Date Noted    Carotid stenosis, right 2017    Obesity (BMI 30-39.9) 10/14/2016    History of left-sided carotid endarterectomy 2016    Carotid stenosis, bilateral 2016    Localized edema 2016    Bronchitis 2016    JENSEN (obstructive sleep apnea) 2010    Gastroesophageal reflux disease 2010    Hypoxemia 2010    Status post coronary artery bypass graft 2010    CRI (chronic renal insufficiency) 2010    HTN (hypertension) 2010    CAD (coronary artery disease) 2010    Hyperlipidemia 2010    Carotid disease, bilateral ([address] 2010       Plan/Recommendations/Medical Decision Making:     DC home    Elements of this note have been dictated using speech recognition software. As a result, errors of speech recognition may have occurred.

## 2017-03-04 ENCOUNTER — PATIENT OUTREACH (OUTPATIENT)
Dept: CASE MANAGEMENT | Age: 72
End: 2017-03-04

## 2017-03-04 NOTE — PROGRESS NOTES
Transition of Care Discharge Follow-up Questionnaire   Date/Time of Call:   3/4/17 4:11p   What was the patient hospitalized for? Carotid stenosis, right   Does the patient understand his/her diagnosis and/or treatment and what happened during the hospitalization? Patient verbalized understanding of treatment and diagnosis. Did the patient receive discharge instructions? Yes   Review any discharge instructions (see notes in [facility]). Ask patient if they understand these. Do they have any questions? He verbalized understanding of instructions. Were home services ordered (nursing, PT, OT, ST, etc.)? No   If so, has the first visit occurred? If not, why? (Assist with coordination of services if necessary.) n/a   Was any DME ordered? No   If so, has it been received? If not, why?  (Assist with coordination of arranging DME orders if necessary.) n/a   Complete a review of all medications (new, continued and discontinued meds per the D/C instructions and medication tab in [facility]). Patient is compliant with medications. Were all new prescriptions filled? If not, why?  (Assist with obtainment of medications if necessary.) No, patient will  Percocet today. Does the patient understand the purpose and dosing instructions for all medications? (If patient has questions, provide explanation and education.) Yes. Patient has taken baby aspirin and BP pill today. Patient was instructed to resume all other medications tomorrow. Does the patient have any problems in performing ADLs? (If patient is unable to perform ADLs  what is the limiting factor(s)? Do they have a support system that can assist? If no support system is present, discuss possible assistance that they may be able to obtain.) Patient does not have any problems in performing ADLs. Patient lives with spouse and has support. Does the patient have all follow-up appointments scheduled?   Has transportation been arranged? [facility]monary follow-up should be within 7 days of discharge; all others should have PCP follow-up within 7 days of discharge; follow-ups with other specialists as appropriate or ordered.) Patient has f/u appointments scheduled with his Internist and surgeon within the next two weeks. Any other questions or concerns expressed by the patient? No other questions or concerns were voiced by patient to care coordinator. Care coordinator was thankful patient spoke in spite of surgery and thanked him for the conversation. He was appreciative for phone call. LAMONTE Call Completed By: Gia Herndon LPN  [facility] [address]  Coordinator          This note will not be viewable in [address]

## 2018-04-16 DIAGNOSIS — R60.0 LOCALIZED EDEMA: ICD-10-CM

## 2018-04-16 DIAGNOSIS — I25.810 CORONARY ARTERY DISEASE INVOLVING CORONARY BYPASS GRAFT OF NATIVE HEART WITHOUT ANGINA PECTORIS: Chronic | ICD-10-CM

## 2018-04-16 RX ORDER — HYDROCHLOROTHIAZIDE 25 MG/1
25 TABLET ORAL DAILY
Qty: 90 TAB | Refills: 3 | Status: SHIPPED | OUTPATIENT
Start: 2018-04-16 | End: 2019-02-06 | Stop reason: SDUPTHER

## 2018-04-16 RX ORDER — CLOPIDOGREL BISULFATE 75 MG/1
75 TABLET ORAL DAILY
Qty: 90 TAB | Refills: 3 | Status: SHIPPED | OUTPATIENT
Start: 2018-04-16 | End: 2019-02-06 | Stop reason: SDUPTHER

## 2018-04-16 RX ORDER — SPIRONOLACTONE 25 MG/1
25 TABLET ORAL DAILY
Qty: 90 TAB | Refills: 3 | Status: SHIPPED | OUTPATIENT
Start: 2018-04-16 | End: 2019-02-06 | Stop reason: SDUPTHER

## 2019-02-07 ENCOUNTER — HOSPITAL ENCOUNTER (OUTPATIENT)
Dept: LAB | Age: 74
Discharge: HOME | End: 2019-02-07
Payer: MEDICARE

## 2019-02-07 DIAGNOSIS — E78.2 MIXED HYPERLIPIDEMIA: Chronic | ICD-10-CM

## 2019-02-07 LAB
CHOLEST SERPL-MCNC: 179 MG/DL
HDLC SERPL-MCNC: 29 MG/DL (ref 40–60)
HDLC SERPL: 6.2 {RATIO}
LDLC SERPL CALC-MCNC: 83.2 MG/DL
LIPID PROFILE,FLP: ABNORMAL
TRIGL SERPL-MCNC: 334 MG/DL (ref 35–150)
VLDLC SERPL CALC-MCNC: 66.8 MG/DL (ref 6–23)

## 2019-02-07 PROCEDURE — 36415 COLL VENOUS BLD VENIPUNCTURE: CPT

## 2019-02-07 PROCEDURE — 80061 LIPID PANEL: CPT

## 2019-02-20 ENCOUNTER — HOSPITAL ENCOUNTER (INPATIENT)
Dept: CARDIAC CATH/INVASIVE PROCEDURES | Age: 74
LOS: 2 days | Discharge: HOME | DRG: 247 | End: 2019-02-22
Attending: INTERNAL MEDICINE | Admitting: INTERNAL MEDICINE
Payer: MEDICARE

## 2019-02-20 PROBLEM — R07.9 CHEST PAIN: Status: ACTIVE | Noted: 2019-02-20

## 2019-02-20 LAB
ACT BLD: 142 SECS (ref 70–128)
ACT BLD: 153 SECS (ref 70–128)
ACT BLD: 158 SECS (ref 70–128)
ACT BLD: 164 SECS (ref 70–128)
ACT BLD: 169 SECS (ref 70–128)
ACT BLD: 186 SECS (ref 70–128)
ACT BLD: 367 SECS (ref 70–128)
ANION GAP SERPL CALC-SCNC: 7 MMOL/L (ref 7–16)
APTT PPP: 75.8 SEC (ref 24.7–39.8)
ATRIAL RATE: 89 BPM
BUN SERPL-MCNC: 24 MG/DL (ref 8–23)
CALCIUM SERPL-MCNC: 9.8 MG/DL (ref 8.3–10.4)
CALCULATED P AXIS, ECG09: 55 DEGREES
CALCULATED R AXIS, ECG10: 44 DEGREES
CALCULATED T AXIS, ECG11: 98 DEGREES
CHLORIDE SERPL-SCNC: 104 MMOL/L (ref 98–107)
CO2 SERPL-SCNC: 30 MMOL/L (ref 21–32)
CREAT SERPL-MCNC: 1.32 MG/DL (ref 0.8–1.5)
DIAGNOSIS, 93000: NORMAL
ERYTHROCYTE [DISTWIDTH] IN BLOOD BY AUTOMATED COUNT: 11.6 % (ref 11.9–14.6)
ERYTHROCYTE [DISTWIDTH] IN BLOOD BY AUTOMATED COUNT: 11.6 % (ref 11.9–14.6)
GLUCOSE SERPL-MCNC: 122 MG/DL (ref 65–100)
HCT VFR BLD AUTO: 42.4 % (ref 41.1–50.3)
HCT VFR BLD AUTO: 45.9 % (ref 41.1–50.3)
HGB BLD-MCNC: 15.3 G/DL (ref 13.6–17.2)
HGB BLD-MCNC: 16.6 G/DL (ref 13.6–17.2)
INR PPP: 1
MAGNESIUM SERPL-MCNC: 2.2 MG/DL (ref 1.8–2.4)
MCH RBC QN AUTO: 34.2 PG (ref 26.1–32.9)
MCH RBC QN AUTO: 34.5 PG (ref 26.1–32.9)
MCHC RBC AUTO-ENTMCNC: 36.1 G/DL (ref 31.4–35)
MCHC RBC AUTO-ENTMCNC: 36.2 G/DL (ref 31.4–35)
MCV RBC AUTO: 94.9 FL (ref 79.6–97.8)
MCV RBC AUTO: 95.4 FL (ref 79.6–97.8)
NRBC # BLD: 0 K/UL (ref 0–0.2)
NRBC # BLD: 0 K/UL (ref 0–0.2)
P-R INTERVAL, ECG05: 182 MS
PLATELET # BLD AUTO: 192 K/UL (ref 150–450)
PLATELET # BLD AUTO: 197 K/UL (ref 150–450)
PMV BLD AUTO: 8.4 FL (ref 9.4–12.3)
PMV BLD AUTO: 8.4 FL (ref 9.4–12.3)
POTASSIUM SERPL-SCNC: 4.1 MMOL/L (ref 3.5–5.1)
PROTHROMBIN TIME: 13.3 SEC (ref 11.7–14.5)
Q-T INTERVAL, ECG07: 388 MS
QRS DURATION, ECG06: 90 MS
QTC CALCULATION (BEZET), ECG08: 472 MS
RBC # BLD AUTO: 4.47 M/UL (ref 4.23–5.6)
RBC # BLD AUTO: 4.81 M/UL (ref 4.23–5.6)
SODIUM SERPL-SCNC: 141 MMOL/L (ref 136–145)
VENTRICULAR RATE, ECG03: 89 BPM
WBC # BLD AUTO: 6.1 K/UL (ref 4.3–11.1)
WBC # BLD AUTO: 7.3 K/UL (ref 4.3–11.1)

## 2019-02-20 PROCEDURE — C1725 CATH, TRANSLUMIN NON-LASER: HCPCS

## 2019-02-20 PROCEDURE — 74011000250 HC RX REV CODE- 250: Performed by: INTERNAL MEDICINE

## 2019-02-20 PROCEDURE — 65610000006 HC RM INTENSIVE CARE

## 2019-02-20 PROCEDURE — 77030004534 HC CATH ANGI DX INFN CARD -A

## 2019-02-20 PROCEDURE — 85610 PROTHROMBIN TIME: CPT

## 2019-02-20 PROCEDURE — 99153 MOD SED SAME PHYS/QHP EA: CPT

## 2019-02-20 PROCEDURE — 77030020263 HC SOL INJ SOD CL0.9% LFCR 1000ML

## 2019-02-20 PROCEDURE — C1894 INTRO/SHEATH, NON-LASER: HCPCS

## 2019-02-20 PROCEDURE — B2131ZZ FLUOROSCOPY OF MULTIPLE CORONARY ARTERY BYPASS GRAFTS USING LOW OSMOLAR CONTRAST: ICD-10-PCS | Performed by: INTERNAL MEDICINE

## 2019-02-20 PROCEDURE — 80048 BASIC METABOLIC PNL TOTAL CA: CPT

## 2019-02-20 PROCEDURE — 86923 COMPATIBILITY TEST ELECTRIC: CPT

## 2019-02-20 PROCEDURE — 93459 L HRT ART/GRFT ANGIO: CPT

## 2019-02-20 PROCEDURE — 74011250636 HC RX REV CODE- 250/636: Performed by: INTERNAL MEDICINE

## 2019-02-20 PROCEDURE — C1874 STENT, COATED/COV W/DEL SYS: HCPCS

## 2019-02-20 PROCEDURE — 99152 MOD SED SAME PHYS/QHP 5/>YRS: CPT

## 2019-02-20 PROCEDURE — 74011250637 HC RX REV CODE- 250/637: Performed by: INTERNAL MEDICINE

## 2019-02-20 PROCEDURE — 5A1223Z PERFORMANCE OF CARDIAC PACING, CONTINUOUS: ICD-10-PCS | Performed by: INTERNAL MEDICINE

## 2019-02-20 PROCEDURE — C1769 GUIDE WIRE: HCPCS

## 2019-02-20 PROCEDURE — 74011250636 HC RX REV CODE- 250/636

## 2019-02-20 PROCEDURE — 33210 INSERT ELECTRD/PM CATH SNGL: CPT

## 2019-02-20 PROCEDURE — C1887 CATHETER, GUIDING: HCPCS

## 2019-02-20 PROCEDURE — 93005 ELECTROCARDIOGRAM TRACING: CPT | Performed by: INTERNAL MEDICINE

## 2019-02-20 PROCEDURE — 85730 THROMBOPLASTIN TIME PARTIAL: CPT

## 2019-02-20 PROCEDURE — 77030012468 HC VLV BLEEDBK CNTRL ABBT -B

## 2019-02-20 PROCEDURE — 86900 BLOOD TYPING SEROLOGIC ABO: CPT

## 2019-02-20 PROCEDURE — 74011636320 HC RX REV CODE- 636/320: Performed by: INTERNAL MEDICINE

## 2019-02-20 PROCEDURE — 85027 COMPLETE CBC AUTOMATED: CPT

## 2019-02-20 PROCEDURE — 83735 ASSAY OF MAGNESIUM: CPT

## 2019-02-20 PROCEDURE — B2151ZZ FLUOROSCOPY OF LEFT HEART USING LOW OSMOLAR CONTRAST: ICD-10-PCS | Performed by: INTERNAL MEDICINE

## 2019-02-20 PROCEDURE — 77030013794 HC KT TRNSDUC BLD EDWD -B

## 2019-02-20 PROCEDURE — B2111ZZ FLUOROSCOPY OF MULTIPLE CORONARY ARTERIES USING LOW OSMOLAR CONTRAST: ICD-10-PCS | Performed by: INTERNAL MEDICINE

## 2019-02-20 PROCEDURE — 77030019569 HC BND COMPR RAD TERU -B

## 2019-02-20 PROCEDURE — 027236Z DILATION OF CORONARY ARTERY, THREE ARTERIES WITH THREE DRUG-ELUTING INTRALUMINAL DEVICES, PERCUTANEOUS APPROACH: ICD-10-PCS | Performed by: INTERNAL MEDICINE

## 2019-02-20 PROCEDURE — 85347 COAGULATION TIME ACTIVATED: CPT

## 2019-02-20 PROCEDURE — 74011000258 HC RX REV CODE- 258: Performed by: INTERNAL MEDICINE

## 2019-02-20 PROCEDURE — 4A023N7 MEASUREMENT OF CARDIAC SAMPLING AND PRESSURE, LEFT HEART, PERCUTANEOUS APPROACH: ICD-10-PCS | Performed by: INTERNAL MEDICINE

## 2019-02-20 PROCEDURE — 92928 PRQ TCAT PLMT NTRAC ST 1 LES: CPT

## 2019-02-20 PROCEDURE — 77030005318 HC CATH ELECTRD PACE TMP BARD -C

## 2019-02-20 RX ORDER — DIAZEPAM 5 MG/1
5 TABLET ORAL ONCE
Status: COMPLETED | OUTPATIENT
Start: 2019-02-20 | End: 2019-02-20

## 2019-02-20 RX ORDER — SODIUM CHLORIDE 9 MG/ML
75 INJECTION, SOLUTION INTRAVENOUS CONTINUOUS
Status: DISCONTINUED | OUTPATIENT
Start: 2019-02-20 | End: 2019-02-20

## 2019-02-20 RX ORDER — HYDROCHLOROTHIAZIDE 25 MG/1
25 TABLET ORAL DAILY
Status: DISCONTINUED | OUTPATIENT
Start: 2019-02-21 | End: 2019-02-22 | Stop reason: HOSPADM

## 2019-02-20 RX ORDER — LIDOCAINE HYDROCHLORIDE 10 MG/ML
5-40 INJECTION INFILTRATION; PERINEURAL
Status: DISCONTINUED | OUTPATIENT
Start: 2019-02-20 | End: 2019-02-20 | Stop reason: HOSPADM

## 2019-02-20 RX ORDER — HEPARIN SODIUM 5000 [USP'U]/ML
4000 INJECTION, SOLUTION INTRAVENOUS; SUBCUTANEOUS ONCE
Status: ACTIVE | OUTPATIENT
Start: 2019-02-20 | End: 2019-02-21

## 2019-02-20 RX ORDER — HYDRALAZINE HYDROCHLORIDE 20 MG/ML
INJECTION INTRAMUSCULAR; INTRAVENOUS
Status: COMPLETED
Start: 2019-02-20 | End: 2019-02-20

## 2019-02-20 RX ORDER — SODIUM CHLORIDE 0.9 % (FLUSH) 0.9 %
5-40 SYRINGE (ML) INJECTION AS NEEDED
Status: DISCONTINUED | OUTPATIENT
Start: 2019-02-20 | End: 2019-02-22 | Stop reason: HOSPADM

## 2019-02-20 RX ORDER — MAG HYDROX/ALUMINUM HYD/SIMETH 200-200-20
30 SUSPENSION, ORAL (FINAL DOSE FORM) ORAL
Status: DISCONTINUED | OUTPATIENT
Start: 2019-02-20 | End: 2019-02-22 | Stop reason: HOSPADM

## 2019-02-20 RX ORDER — SODIUM CHLORIDE 9 MG/ML
75 INJECTION, SOLUTION INTRAVENOUS CONTINUOUS
Status: DISCONTINUED | OUTPATIENT
Start: 2019-02-20 | End: 2019-02-22

## 2019-02-20 RX ORDER — CLOPIDOGREL BISULFATE 75 MG/1
75 TABLET ORAL DAILY
Status: DISCONTINUED | OUTPATIENT
Start: 2019-02-21 | End: 2019-02-22 | Stop reason: HOSPADM

## 2019-02-20 RX ORDER — NITROGLYCERIN 20 MG/100ML
0-20 INJECTION INTRAVENOUS
Status: DISCONTINUED | OUTPATIENT
Start: 2019-02-20 | End: 2019-02-20 | Stop reason: SDUPTHER

## 2019-02-20 RX ORDER — SODIUM CHLORIDE 0.9 % (FLUSH) 0.9 %
5-40 SYRINGE (ML) INJECTION EVERY 8 HOURS
Status: DISCONTINUED | OUTPATIENT
Start: 2019-02-20 | End: 2019-02-21

## 2019-02-20 RX ORDER — METOPROLOL TARTRATE 25 MG/1
25 TABLET, FILM COATED ORAL 2 TIMES DAILY
Status: DISCONTINUED | OUTPATIENT
Start: 2019-02-20 | End: 2019-02-20

## 2019-02-20 RX ORDER — METOPROLOL TARTRATE 5 MG/5ML
5 INJECTION INTRAVENOUS
Status: COMPLETED | OUTPATIENT
Start: 2019-02-20 | End: 2019-02-20

## 2019-02-20 RX ORDER — HYDRALAZINE HYDROCHLORIDE 20 MG/ML
10 INJECTION INTRAMUSCULAR; INTRAVENOUS
Status: DISCONTINUED | OUTPATIENT
Start: 2019-02-20 | End: 2019-02-22 | Stop reason: HOSPADM

## 2019-02-20 RX ORDER — HEPARIN SODIUM 200 [USP'U]/100ML
3 INJECTION, SOLUTION INTRAVENOUS CONTINUOUS
Status: DISCONTINUED | OUTPATIENT
Start: 2019-02-20 | End: 2019-02-20 | Stop reason: HOSPADM

## 2019-02-20 RX ORDER — GUAIFENESIN 100 MG/5ML
81-324 LIQUID (ML) ORAL
Status: COMPLETED | OUTPATIENT
Start: 2019-02-20 | End: 2019-02-20

## 2019-02-20 RX ORDER — SODIUM CHLORIDE 9 MG/ML
250 INJECTION, SOLUTION INTRAVENOUS AS NEEDED
Status: DISCONTINUED | OUTPATIENT
Start: 2019-02-20 | End: 2019-02-22 | Stop reason: HOSPADM

## 2019-02-20 RX ORDER — SPIRONOLACTONE 25 MG/1
25 TABLET ORAL DAILY
Status: DISCONTINUED | OUTPATIENT
Start: 2019-02-21 | End: 2019-02-22 | Stop reason: HOSPADM

## 2019-02-20 RX ORDER — METOPROLOL TARTRATE 25 MG/1
25 TABLET, FILM COATED ORAL 2 TIMES DAILY
Status: DISCONTINUED | OUTPATIENT
Start: 2019-02-20 | End: 2019-02-22 | Stop reason: HOSPADM

## 2019-02-20 RX ORDER — MORPHINE SULFATE 10 MG/ML
2 INJECTION, SOLUTION INTRAMUSCULAR; INTRAVENOUS ONCE
Status: COMPLETED | OUTPATIENT
Start: 2019-02-20 | End: 2019-02-20

## 2019-02-20 RX ORDER — CLOPIDOGREL BISULFATE 75 MG/1
600 TABLET ORAL ONCE
Status: COMPLETED | OUTPATIENT
Start: 2019-02-20 | End: 2019-02-20

## 2019-02-20 RX ORDER — MIDAZOLAM HYDROCHLORIDE 1 MG/ML
.5-5 INJECTION, SOLUTION INTRAMUSCULAR; INTRAVENOUS
Status: DISCONTINUED | OUTPATIENT
Start: 2019-02-20 | End: 2019-02-20 | Stop reason: HOSPADM

## 2019-02-20 RX ORDER — NITROGLYCERIN 20 MG/100ML
0-200 INJECTION INTRAVENOUS
Status: DISCONTINUED | OUTPATIENT
Start: 2019-02-20 | End: 2019-02-21

## 2019-02-20 RX ORDER — GUAIFENESIN 100 MG/5ML
81 LIQUID (ML) ORAL
Status: DISCONTINUED | OUTPATIENT
Start: 2019-02-20 | End: 2019-02-22 | Stop reason: HOSPADM

## 2019-02-20 RX ORDER — FENTANYL CITRATE 50 UG/ML
25-100 INJECTION, SOLUTION INTRAMUSCULAR; INTRAVENOUS
Status: DISCONTINUED | OUTPATIENT
Start: 2019-02-20 | End: 2019-02-20 | Stop reason: HOSPADM

## 2019-02-20 RX ORDER — HEPARIN SODIUM 5000 [USP'U]/100ML
12-25 INJECTION, SOLUTION INTRAVENOUS
Status: DISCONTINUED | OUTPATIENT
Start: 2019-02-20 | End: 2019-02-21

## 2019-02-20 RX ORDER — SODIUM CHLORIDE 0.9 % (FLUSH) 0.9 %
5-40 SYRINGE (ML) INJECTION EVERY 8 HOURS
Status: DISCONTINUED | OUTPATIENT
Start: 2019-02-20 | End: 2019-02-22 | Stop reason: HOSPADM

## 2019-02-20 RX ADMIN — METOPROLOL TARTRATE 5 MG: 5 INJECTION INTRAVENOUS at 15:20

## 2019-02-20 RX ADMIN — MIDAZOLAM HYDROCHLORIDE 2 MG: 1 INJECTION, SOLUTION INTRAMUSCULAR; INTRAVENOUS at 11:40

## 2019-02-20 RX ADMIN — HEPARIN SODIUM 12 UNITS/KG/HR: 5000 INJECTION, SOLUTION INTRAVENOUS at 16:27

## 2019-02-20 RX ADMIN — HYDRALAZINE HYDROCHLORIDE 10 MG: 20 INJECTION INTRAMUSCULAR; INTRAVENOUS at 17:59

## 2019-02-20 RX ADMIN — FENTANYL CITRATE 50 MCG: 50 INJECTION, SOLUTION INTRAMUSCULAR; INTRAVENOUS at 11:54

## 2019-02-20 RX ADMIN — Medication 10 ML: at 17:29

## 2019-02-20 RX ADMIN — FENTANYL CITRATE 50 MCG: 50 INJECTION, SOLUTION INTRAMUSCULAR; INTRAVENOUS at 11:38

## 2019-02-20 RX ADMIN — BIVALIRUDIN 1.75 MG/KG/HR: 250 INJECTION, POWDER, LYOPHILIZED, FOR SOLUTION INTRAVENOUS at 12:02

## 2019-02-20 RX ADMIN — MIDAZOLAM HYDROCHLORIDE 2 MG: 1 INJECTION, SOLUTION INTRAMUSCULAR; INTRAVENOUS at 12:32

## 2019-02-20 RX ADMIN — CLOPIDOGREL BISULFATE 600 MG: 75 TABLET, FILM COATED ORAL at 14:02

## 2019-02-20 RX ADMIN — ASPIRIN 81 MG 324 MG: 81 TABLET ORAL at 10:25

## 2019-02-20 RX ADMIN — FENTANYL CITRATE 50 MCG: 50 INJECTION, SOLUTION INTRAMUSCULAR; INTRAVENOUS at 12:48

## 2019-02-20 RX ADMIN — BIVALIRUDIN 1.75 MG/KG/HR: 250 INJECTION, POWDER, LYOPHILIZED, FOR SOLUTION INTRAVENOUS at 11:26

## 2019-02-20 RX ADMIN — MORPHINE SULFATE 2 MG: 10 INJECTION INTRAVENOUS at 14:50

## 2019-02-20 RX ADMIN — IOPAMIDOL 200 ML: 755 INJECTION, SOLUTION INTRAVENOUS at 13:04

## 2019-02-20 RX ADMIN — IOPAMIDOL 60 ML: 755 INJECTION, SOLUTION INTRAVENOUS at 14:01

## 2019-02-20 RX ADMIN — Medication 10 ML: at 17:28

## 2019-02-20 RX ADMIN — DIAZEPAM 5 MG: 5 TABLET ORAL at 10:25

## 2019-02-20 RX ADMIN — HEPARIN SODIUM 3 ML/HR: 5000 INJECTION, SOLUTION INTRAVENOUS; SUBCUTANEOUS at 14:08

## 2019-02-20 RX ADMIN — MIDAZOLAM HYDROCHLORIDE 2 MG: 1 INJECTION, SOLUTION INTRAMUSCULAR; INTRAVENOUS at 11:54

## 2019-02-20 RX ADMIN — FENTANYL CITRATE 50 MCG: 50 INJECTION, SOLUTION INTRAMUSCULAR; INTRAVENOUS at 13:16

## 2019-02-20 RX ADMIN — MIDAZOLAM HYDROCHLORIDE 2 MG: 1 INJECTION, SOLUTION INTRAMUSCULAR; INTRAVENOUS at 13:46

## 2019-02-20 RX ADMIN — FENTANYL CITRATE 50 MCG: 50 INJECTION, SOLUTION INTRAMUSCULAR; INTRAVENOUS at 13:29

## 2019-02-20 RX ADMIN — FENTANYL CITRATE 50 MCG: 50 INJECTION, SOLUTION INTRAMUSCULAR; INTRAVENOUS at 12:32

## 2019-02-20 RX ADMIN — MIDAZOLAM HYDROCHLORIDE 2 MG: 1 INJECTION, SOLUTION INTRAMUSCULAR; INTRAVENOUS at 13:16

## 2019-02-20 RX ADMIN — HEPARIN SODIUM 3 ML/HR: 5000 INJECTION, SOLUTION INTRAVENOUS; SUBCUTANEOUS at 11:18

## 2019-02-20 RX ADMIN — LIDOCAINE HYDROCHLORIDE 12 ML: 10 INJECTION, SOLUTION INFILTRATION; PERINEURAL at 11:17

## 2019-02-20 RX ADMIN — BIVALIRUDIN 1.75 MG/KG/HR: 250 INJECTION, POWDER, LYOPHILIZED, FOR SOLUTION INTRAVENOUS at 13:20

## 2019-02-20 RX ADMIN — MIDAZOLAM HYDROCHLORIDE 2 MG: 1 INJECTION, SOLUTION INTRAMUSCULAR; INTRAVENOUS at 11:14

## 2019-02-20 RX ADMIN — NITROGLYCERIN 30 MCG/MIN: 20 INJECTION INTRAVENOUS at 13:43

## 2019-02-20 RX ADMIN — SODIUM CHLORIDE 75 ML/HR: 900 INJECTION, SOLUTION INTRAVENOUS at 14:56

## 2019-02-20 NOTE — PROCEDURES
Cardiac Catheterization Procedure Note    Patient ID:     Name: Jojo Rayo   Medical Record Number: [id number]   YOB: 1945    Date of Procedure: 2/20/2019     Pre-procedure Diagnosis:  Typical Angina    Post-procedure Diagnosis: Coronary Artery Disease    Reason for Procedure: New Onset Angina < or = 2 Months    Blood loss less than 5 ml    Sedation. Pt received 12 mg versed and 200 mcg fentanyl for monitored conscious sedation from 11:15 to 2:00. Nurse jens    Specimen: None    No complications    No assistants    Time out, Mallampati, and ASA performed    Procedure:  After informed consent, patient was prepped and draped in the usual sterile fashion. The right groin was infiltrated with lidocaine. The right femoral artery was accessed via the modified Seldinger technique with a 6 Spanish sheath. 300cc Visipaque contrast were utilized for the entire procedure. no closure device used    Catheters/wires/stents.       FINDINGS    Left Ventricle: 50  LVEDP: 18    Left Main:ok    Left Anterior descending coronary artery: occluded    diagonals patent    Left Circumflex coronary artery: occluded   OMs occluded   Ramus      Right coronary artery: 95%   RV branch     MUKUL/PDA patent      Graft anatomy: siegel to lad patent  Sv to om occluded    Intervention if done: extensive high complex/difficult stenting times 4 to abruptly occluded rca eventual beth 3 flow    Conclusions: complex one vessel stenting    Recommentations: dapt    No complications      Signed By: Delia Gifford MD

## 2019-02-20 NOTE — PROGRESS NOTES
TRANSFER - IN REPORT: 
 
Verbal report received from [facility]) on Opal Carroll  being received from cath lab(unit) for routine progression of care Report consisted of patients Situation, Background, Assessment and  
Recommendations(SBAR). Information from the following report(s) SBAR, Procedure Summary, MAR and Cardiac Rhythm SR was reviewed with the receiving nurse. Opportunity for questions and clarification was provided. Assessment completed upon patients arrival to unit and care assumed. Received to [employer].

## 2019-02-20 NOTE — PROGRESS NOTES
Pt arrived, ambulated to room with no visible problems, planned C for Dr Mami Mancilla. Consent signed, Procedure discussed with pt all questions answered voiced understanding. Medications and history discussed with pt. Pt prepped per ordersThe patient has a fraility score of 3-MANAGING WELL, based on ability to complete ADLs without assistance, increased symptoms

## 2019-02-20 NOTE — PROGRESS NOTES
TRANSFER - OUT REPORT: 
 
Verbal report given to Quincy Ge RN(name) on Dane Plascencia  being transferred to CVICU(unit) for routine progression of care Report consisted of patients Situation, Background, Assessment and  
Recommendations(SBAR). Information from the following report(s) SBAR was reviewed with the receiving nurse. is allergic to other medication and statins-hmg-coa reductase inhibitors. Opportunity for questions and clarification was provided. Procedure Summary:Pt had LHC with 3 stents placed in RCA via RFA. 6 fr sheath left in place and connected to arterial line. RFV has 6 fr sheath in place, temp pacer no longer in use. Both sites covered with clear dsg. Med Administration Nitroglycerin gtt Versed:  12 mg Fentanyl: 200 mcg Angiomax Stop Time: continue on admission Visit Vitals /90 (BP 1 Location: Left arm, BP Patient Position: Supine) Pulse 91 Temp 98.1 °F (36.7 °C) Resp 16 Ht 5' 8\" (1.727 m) Wt 102.1 kg (225 lb) SpO2 95% BMI 34.21 kg/m² Past Medical History:  
Diagnosis Date  Arthritis  Asymptomatic stenosis of right carotid artery without infarction 7/27/2016  CAD (coronary artery disease) 3/26/10  
 cabg-3  CAD (coronary artery disease) 2002  
 x 4  
 Carotid disease, bilateral ([facility] [address] 3/25/2010  Chest pain  Chronic pain   
 rotator cuff pain- bilat  GERD (gastroesophageal reflux disease)   
 rare  H/O seasonal allergies  Heart failure ([address]  History of left-sided carotid endarterectomy 7/27/2016 6/11/2010 Dr Christianson  HTN (hypertension) 3/25/2010  Hyperlipidemia 3/25/2010  Hypertension   
 pt denies- does not take meds  NSTEMI (non-ST elevated myocardial infarction) ([facility] [address] 3/7/2016  Obesity 3/15/2016  Obesity (BMI 30.0-39.9) 6/8/10  
 32.1  Tinnitis, bilateral   
 Unspecified sleep apnea   
 cpap at night Peripheral IV 02/20/19 Right Antecubital (Active) Peripheral IV 02/20/19 Left Antecubital (Active)

## 2019-02-20 NOTE — PROGRESS NOTES
Care Management Interventions PCP Verified by CM: Yes Mode of Transport at Discharge: Other (see comment)(family ) Transition of Care Consult (CM Consult): Discharge Planning Current Support Network: Lives with Spouse, Own Home Confirm Follow Up Transport: Family Plan discussed with Pt/Family/Caregiver: Yes Freedom of Choice Offered: Yes Discharge Location Discharge Placement: Home This CM spoke with pt, spouse, and son at bedside. Pt's wife verified pt's insurance, emergency contact, and home address. Pt's PCP recently moved to another area and is no longer pts PCP. Spouse reports that they are currently looking for another PCP but states that Dr. Michelle Boles has been overlooking his care in the meantime. He has no difficulty obtaining his medications in the community. He lives at home with spouse in [place] with 2 steps to enter. His home DME includes a CPAP machine (they could not remember the name of the company that serviced the CPAP). They confirm that at baseline pt is independent with his ADLs including bathing, dressing, cooking, and driving. At this time, discharge plan is for pt to return home with spouse who will provide transportation at discharge. No additional discharge needs voiced at this time. CM to continue to follow.

## 2019-02-20 NOTE — PROGRESS NOTES
made initial visit. Pt's  and his staff had just left the pt.  had worked with pt 4hrs in cath lab and was talking with the pt and his family regarding the outcome of the procedure. Not really good news, next 24 hrs will be informative. Pt was alert and verbal, tired, and family/friends were at bedside. Pt appeared comfortable with no pain level expressed or observed. Pt shared some of his trell and was not distraught about the news.  provided spiritual care through presence, pastoral conversation, sharing scripture, prayer, and assurance of prayer.  did welcome them to [facility] and shared information about  services. Some family will be staying the night.

## 2019-02-20 NOTE — PROGRESS NOTES
Called to pre-assess for [facility] poss with Dr Florecita Flores , Scheduled 2/20/19. No answer & message left.

## 2019-02-21 LAB
ANION GAP SERPL CALC-SCNC: 13 MMOL/L (ref 7–16)
APTT PPP: 64.5 SEC (ref 24.7–39.8)
APTT PPP: 96.9 SEC (ref 24.7–39.8)
ATRIAL RATE: 97 BPM
BUN SERPL-MCNC: 16 MG/DL (ref 8–23)
CALCIUM SERPL-MCNC: 8.4 MG/DL (ref 8.3–10.4)
CALCULATED P AXIS, ECG09: 53 DEGREES
CALCULATED R AXIS, ECG10: -13 DEGREES
CALCULATED T AXIS, ECG11: 96 DEGREES
CHLORIDE SERPL-SCNC: 104 MMOL/L (ref 98–107)
CO2 SERPL-SCNC: 24 MMOL/L (ref 21–32)
CREAT SERPL-MCNC: 0.92 MG/DL (ref 0.8–1.5)
DIAGNOSIS, 93000: NORMAL
ERYTHROCYTE [DISTWIDTH] IN BLOOD BY AUTOMATED COUNT: 11.9 % (ref 11.9–14.6)
GLUCOSE SERPL-MCNC: 117 MG/DL (ref 65–100)
HCT VFR BLD AUTO: 40.4 % (ref 41.1–50.3)
HGB BLD-MCNC: 14.3 G/DL (ref 13.6–17.2)
MCH RBC QN AUTO: 34.1 PG (ref 26.1–32.9)
MCHC RBC AUTO-ENTMCNC: 35.4 G/DL (ref 31.4–35)
MCV RBC AUTO: 96.4 FL (ref 79.6–97.8)
NRBC # BLD: 0 K/UL (ref 0–0.2)
P-R INTERVAL, ECG05: 158 MS
PLATELET # BLD AUTO: 185 K/UL (ref 150–450)
PMV BLD AUTO: 8.7 FL (ref 9.4–12.3)
POTASSIUM SERPL-SCNC: 3.5 MMOL/L (ref 3.5–5.1)
Q-T INTERVAL, ECG07: 330 MS
QRS DURATION, ECG06: 86 MS
QTC CALCULATION (BEZET), ECG08: 419 MS
RBC # BLD AUTO: 4.19 M/UL (ref 4.23–5.6)
SODIUM SERPL-SCNC: 141 MMOL/L (ref 136–145)
VENTRICULAR RATE, ECG03: 97 BPM
WBC # BLD AUTO: 10.1 K/UL (ref 4.3–11.1)

## 2019-02-21 PROCEDURE — 74011250636 HC RX REV CODE- 250/636: Performed by: INTERNAL MEDICINE

## 2019-02-21 PROCEDURE — 36415 COLL VENOUS BLD VENIPUNCTURE: CPT

## 2019-02-21 PROCEDURE — 93005 ELECTROCARDIOGRAM TRACING: CPT | Performed by: INTERNAL MEDICINE

## 2019-02-21 PROCEDURE — 85027 COMPLETE CBC AUTOMATED: CPT

## 2019-02-21 PROCEDURE — 85730 THROMBOPLASTIN TIME PARTIAL: CPT

## 2019-02-21 PROCEDURE — 74011250637 HC RX REV CODE- 250/637: Performed by: INTERNAL MEDICINE

## 2019-02-21 PROCEDURE — 80048 BASIC METABOLIC PNL TOTAL CA: CPT

## 2019-02-21 PROCEDURE — 65610000006 HC RM INTENSIVE CARE

## 2019-02-21 PROCEDURE — 77030018846 HC SOL IRR STRL H20 ICUM -A

## 2019-02-21 RX ORDER — ACETAMINOPHEN 325 MG/1
650 TABLET ORAL
Status: DISCONTINUED | OUTPATIENT
Start: 2019-02-21 | End: 2019-02-22 | Stop reason: HOSPADM

## 2019-02-21 RX ORDER — HEPARIN SODIUM 5000 [USP'U]/ML
35 INJECTION, SOLUTION INTRAVENOUS; SUBCUTANEOUS ONCE
Status: COMPLETED | OUTPATIENT
Start: 2019-02-21 | End: 2019-02-21

## 2019-02-21 RX ORDER — POTASSIUM CHLORIDE 20 MEQ/1
40 TABLET, EXTENDED RELEASE ORAL
Status: COMPLETED | OUTPATIENT
Start: 2019-02-21 | End: 2019-02-21

## 2019-02-21 RX ORDER — NITROGLYCERIN 0.4 MG/1
0.4 TABLET SUBLINGUAL AS NEEDED
Status: DISCONTINUED | OUTPATIENT
Start: 2019-02-21 | End: 2019-02-22 | Stop reason: HOSPADM

## 2019-02-21 RX ADMIN — ASPIRIN 81 MG 81 MG: 81 TABLET ORAL at 21:16

## 2019-02-21 RX ADMIN — Medication 10 ML: at 16:48

## 2019-02-21 RX ADMIN — Medication 10 ML: at 06:05

## 2019-02-21 RX ADMIN — POTASSIUM CHLORIDE 40 MEQ: 20 TABLET, EXTENDED RELEASE ORAL at 08:47

## 2019-02-21 RX ADMIN — ACETAMINOPHEN 650 MG: 325 TABLET, FILM COATED ORAL at 18:12

## 2019-02-21 RX ADMIN — SPIRONOLACTONE 25 MG: 25 TABLET ORAL at 08:47

## 2019-02-21 RX ADMIN — HEPARIN SODIUM 3550 UNITS: 5000 INJECTION INTRAVENOUS; SUBCUTANEOUS at 05:48

## 2019-02-21 RX ADMIN — HEPARIN SODIUM 14 UNITS/KG/HR: 5000 INJECTION, SOLUTION INTRAVENOUS at 13:00

## 2019-02-21 RX ADMIN — CLOPIDOGREL 75 MG: 75 TABLET, FILM COATED ORAL at 08:47

## 2019-02-21 RX ADMIN — ACETAMINOPHEN 650 MG: 325 TABLET, FILM COATED ORAL at 22:50

## 2019-02-21 RX ADMIN — Medication 10 ML: at 00:09

## 2019-02-21 RX ADMIN — SODIUM CHLORIDE 75 ML/HR: 900 INJECTION, SOLUTION INTRAVENOUS at 04:32

## 2019-02-21 RX ADMIN — HYDROCHLOROTHIAZIDE 25 MG: 25 TABLET ORAL at 08:47

## 2019-02-21 RX ADMIN — METOPROLOL TARTRATE 25 MG: 25 TABLET ORAL at 21:16

## 2019-02-21 RX ADMIN — Medication 10 ML: at 21:17

## 2019-02-21 RX ADMIN — ACETAMINOPHEN 650 MG: 325 TABLET, FILM COATED ORAL at 00:22

## 2019-02-21 RX ADMIN — METOPROLOL TARTRATE 25 MG: 25 TABLET ORAL at 08:47

## 2019-02-21 RX ADMIN — ASPIRIN 81 MG 81 MG: 81 TABLET ORAL at 00:08

## 2019-02-21 NOTE — DISCHARGE SUMMARY
[facility] Discharge Summary     Patient ID:  Gareth Parham  [id number]  64 y.o.  1945    Admit date: 2/20/2019    Discharge date:  02/21/2019    Admitting Physician: Janean Soulier, MD     Discharge Physician: Mildred Rodrigues NP/ [facility]    Admission Diagnoses: Abnormal nuclear stress test [R94.39]  Chest pain [R07.9]    Discharge Diagnoses:    Diagnosis    Chest pain    Carotid stenosis, right    Obesity (BMI 30-39. 9)    History of left-sided carotid endarterectomy    Carotid stenosis, bilateral    JENSEN (obstructive sleep apnea)    Gastroesophageal reflux disease    HTN (hypertension)    CAD (coronary artery disease)    Hyperlipidemia    Carotid disease, bilateral [facility])       Cardiology Procedures this admission:  Left heart catheterization with PCI  Consults: None    Hospital Course: Patient was seen at the office of [facility] by Dr. Ap Tamayo for complaints of chest pain and abnormal stress test with ST segment changes and inferolateral ischemia was scheduled for an AM Admission [facility] on 02/20/19. Patient underwent cardiac catheterization by Dr. Amadou Duong. Patient was found to have 95% stenosis of the RCA that was stented with a 2.5x22 mm and 2.5x18 mm Jeff MIKE with 0% residual stenosis. The intervention to the RCA was extensive, high complex intervention with difficulty stenting due to abrupt occlusion of RCA, however, stenting was successful with JAMEE 3 flow achieved after intervention. Patient was also found to have LIMA-LAD is patent and SVG-OM occluded. Patient tolerated the procedure well and was taken to the CVICU floor for recovery. Pt required temporary pacer for intermittent heart block which improved and temp pacer was removed. The morning of discharge, patient was up feeling well without any complaints of chest pain or shortness of breath. Patient's right groin cath site was clean, dry and intact without hematoma or bruit.  Patient's labs were ANASTASIIAL. Patient was seen and examined by Dr. [place] Perry and determined stable and ready for discharge. Patient was instructed on the importance of medication compliance including taking aspirin and Plavix everyday without missing a dose. After receiving drug eluting stents, the patient will remain on dual anti-platelet therapy for 1 year. The patient will follow up with [facility] Cardiology -- Dr. Jessy Roa on 3/8 at [id number] [place] [address] office. Patient has been referred to cardiac rehab. DISPOSITION: The patient is being discharged home in stable condition on a low saturated fat, low cholesterol and low salt diet. The patient is instructed to advance activities as tolerated to the limit of fatigue or shortness of breath. The patient is instructed to avoid all heavy lifting, straining, stooping or squatting for 3-5 days. The patient is instructed to watch the cath site for bleeding/oozing; if seen, the patient is instructed to apply firm pressure with a clean cloth and call [facility] Cardiology at [phone]. The patient is instructed to watch for signs of infection which include: increasing area of redness, fever/hot to touch or purulent drainage at the catheterization site. The patient is instructed not to soak in a bathtub for 7-10 days, but is cleared to shower. The patient is instructed to call the office or return to the ER for immediate evaluation for any shortness of breath or chest pain not relieved by NTG. Discharge Exam:   Visit Vitals  /59   Pulse 95   Temp 97 °F (36.1 °C)   Resp (!) 53   Ht 5' 8\" (1.727 m)   Wt 105.4 kg (232 lb 5.8 oz)   SpO2 96%   BMI 35.33 kg/m²     Patient has been seen by Dr. [place] Perry: see his progress note for exam details.     Recent Results (from the past 24 hour(s))   METABOLIC PANEL, BASIC    Collection Time: 02/20/19 10:15 AM   Result Value Ref Range    Sodium 141 136 - 145 mmol/L    Potassium 4.1 3.5 - 5.1 mmol/L    Chloride 104 98 - 107 mmol/L    CO2 30 21 - 32 mmol/L    Anion gap 7 7 - 16 mmol/L    Glucose 122 (H) 65 - 100 mg/dL    BUN 24 (H) 8 - 23 MG/DL    Creatinine 1.32 0.8 - 1.5 MG/DL    GFR est AA >60 >60 ml/min/1.73m2    GFR est non-AA 56 (L) >60 ml/min/1.73m2    Calcium 9.8 8.3 - 10.4 MG/DL   CBC W/O DIFF    Collection Time: 02/20/19 10:15 AM   Result Value Ref Range    WBC 6.1 4.3 - 11.1 K/uL    RBC 4.81 4.23 - 5.6 M/uL    HGB 16.6 13.6 - 17.2 g/dL    HCT 45.9 41.1 - 50.3 %    MCV 95.4 79.6 - 97.8 FL    MCH 34.5 (H) 26.1 - 32.9 PG    MCHC 36.2 (H) 31.4 - 35.0 g/dL    RDW 11.6 (L) 11.9 - 14.6 %    PLATELET 000 384 - 061 K/uL    MPV 8.4 (L) 9.4 - 12.3 FL    ABSOLUTE NRBC 0.00 0.0 - 0.2 K/uL   PROTHROMBIN TIME + INR    Collection Time: 02/20/19 10:15 AM   Result Value Ref Range    Prothrombin time 13.3 11.7 - 14.5 sec    INR 1.0     MAGNESIUM    Collection Time: 02/20/19 10:15 AM   Result Value Ref Range    Magnesium 2.2 1.8 - 2.4 mg/dL   TYPE + CROSSMATCH    Collection Time: 02/20/19  1:19 PM   Result Value Ref Range    Crossmatch Expiration 02/23/2019     ABO/Rh(D) O POSITIVE     Antibody screen NEG     Unit number [id number]     Blood component type RC LR     Unit division 00     Status of unit ALLOCATED     Crossmatch result Compatible     Unit number [id number]     Blood component type RC LR     Unit division 00     Status of unit ALLOCATED     Crossmatch result Compatible     Unit number [id number]     Blood component type RC LR     Unit division 00     Status of unit ALLOCATED     Crossmatch result Compatible     Unit number [id number]     Blood component type RC LR     Unit division 00     Status of unit ALLOCATED     Crossmatch result Compatible     Unit number [id number]     Blood component type RC LR     Unit division 00     Status of unit ALLOCATED     Crossmatch result Compatible     Unit number [id number]     Blood component type RC LR     Unit division 00     Status of unit ALLOCATED     Crossmatch result Compatible    CBC W/O DIFF    Collection Time: 02/20/19  1:26 PM   Result Value Ref Range    WBC 7.3 4.3 - 11.1 K/uL    RBC 4.47 4.23 - 5.6 M/uL    HGB 15.3 13.6 - 17.2 g/dL    HCT 42.4 41.1 - 50.3 %    MCV 94.9 79.6 - 97.8 FL    MCH 34.2 (H) 26.1 - 32.9 PG    MCHC 36.1 (H) 31.4 - 35.0 g/dL    RDW 11.6 (L) 11.9 - 14.6 %    PLATELET 207 399 - 075 K/uL    MPV 8.4 (L) 9.4 - 12.3 FL    ABSOLUTE NRBC 0.00 0.0 - 0.2 K/uL   POC ACTIVATED CLOTTING TIME    Collection Time: 02/20/19  1:56 PM   Result Value Ref Range    Activated Clotting Time (POC) 367 (H) 70 - 128 SECS   EKG, 12 LEAD, SUBSEQUENT    Collection Time: 02/20/19  2:46 PM   Result Value Ref Range    Ventricular Rate 89 BPM    Atrial Rate 89 BPM    P-R Interval 182 ms    QRS Duration 90 ms    Q-T Interval 388 ms    QTC Calculation (Bezet) 472 ms    Calculated P Axis 55 degrees    Calculated R Axis 44 degrees    Calculated T Axis 98 degrees    Diagnosis       Normal sinus rhythm  ST elevation consider inferior injury or acute infarct  ** ** ACUTE MI / STEMI ** **  Consider right ventricular involvement in acute inferior infarct  Abnormal ECG  When compared with ECG of 07-MAR-2016 08:27,  ST elevation now present in Inferior leads  ST no longer depressed in Anterior leads  Nonspecific T wave abnormality no longer evident in Anterior leads  T wave inversion now evident in Lateral leads  Confirmed by Prabhakar Ward ([id number]) on 2/20/2019 3:01:14 PM     POC ACTIVATED CLOTTING TIME    Collection Time: 02/20/19  4:12 PM   Result Value Ref Range    Activated Clotting Time (POC) 186 (H) 70 - 128 SECS   POC ACTIVATED CLOTTING TIME    Collection Time: 02/20/19  5:28 PM   Result Value Ref Range    Activated Clotting Time (POC) 169 (H) 70 - 128 SECS   POC ACTIVATED CLOTTING TIME    Collection Time: 02/20/19  6:44 PM   Result Value Ref Range    Activated Clotting Time (POC) 158 (H) 70 - 128 SECS   PTT    Collection Time: 02/20/19  7:56 PM   Result Value Ref Range    aPTT 75.8 (H) 24.7 - 39.8 SEC   POC ACTIVATED CLOTTING TIME Collection Time: 02/20/19  7:57 PM   Result Value Ref Range    Activated Clotting Time (POC) 164 (H) 70 - 128 SECS   POC ACTIVATED CLOTTING TIME    Collection Time: 02/20/19  8:56 PM   Result Value Ref Range    Activated Clotting Time (POC) 153 (H) 70 - 128 SECS   POC ACTIVATED CLOTTING TIME    Collection Time: 02/20/19  9:43 PM   Result Value Ref Range    Activated Clotting Time (POC) 142 (H) 70 - 128 SECS   CBC W/O DIFF    Collection Time: 02/21/19  3:37 AM   Result Value Ref Range    WBC 10.1 4.3 - 11.1 K/uL    RBC 4.19 (L) 4.23 - 5.6 M/uL    HGB 14.3 13.6 - 17.2 g/dL    HCT 40.4 (L) 41.1 - 50.3 %    MCV 96.4 79.6 - 97.8 FL    MCH 34.1 (H) 26.1 - 32.9 PG    MCHC 35.4 (H) 31.4 - 35.0 g/dL    RDW 11.9 11.9 - 14.6 %    PLATELET 717 077 - 372 K/uL    MPV 8.7 (L) 9.4 - 12.3 FL    ABSOLUTE NRBC 0.00 0.0 - 0.2 K/uL   PTT    Collection Time: 02/21/19  3:37 AM   Result Value Ref Range    aPTT 64.5 (H) 24.7 - 11.4 SEC   METABOLIC PANEL, BASIC    Collection Time: 02/21/19  3:37 AM   Result Value Ref Range    Sodium 141 136 - 145 mmol/L    Potassium 3.5 3.5 - 5.1 mmol/L    Chloride 104 98 - 107 mmol/L    CO2 24 21 - 32 mmol/L    Anion gap 13 7 - 16 mmol/L    Glucose 117 (H) 65 - 100 mg/dL    BUN 16 8 - 23 MG/DL    Creatinine 0.92 0.8 - 1.5 MG/DL    GFR est AA >60 >60 ml/min/1.73m2    GFR est non-AA >60 >60 ml/min/1.73m2    Calcium 8.4 8.3 - 10.4 MG/DL   EKG, 12 LEAD, SUBSEQUENT    Collection Time: 02/21/19  7:45 AM   Result Value Ref Range    Ventricular Rate 97 BPM    Atrial Rate 97 BPM    P-R Interval 158 ms    QRS Duration 86 ms    Q-T Interval 330 ms    QTC Calculation (Bezet) 419 ms    Calculated P Axis 53 degrees    Calculated R Axis -13 degrees    Calculated T Axis 96 degrees    Diagnosis       Normal sinus rhythm  Inferior infarct , age undetermined  Abnormal ECG           Patient Instructions:   Current Discharge Medication List      CONTINUE these medications which have NOT CHANGED    Details   clopidogrel (PLAVIX) 75 mg tab Take 1 Tab by mouth daily. Qty: 90 Tab, Refills: 3    Associated Diagnoses: Coronary artery disease involving coronary bypass graft of native heart without angina pectoris      metoprolol tartrate (LOPRESSOR) 25 mg tablet Take 1 Tab by mouth two (2) times a day. Qty: 180 Tab, Refills: 3      niacin ER (SLO-NIACIN) 500 mg TbER Take 2,000 mg by mouth. aspirin 81 mg chewable tablet Take 81 mg by mouth nightly. nitroglycerin (NITROSTAT) 0.4 mg SL tablet 1 Tab by SubLINGual route every five (5) minutes as needed for Chest Pain. Qty: 25 Tab, Refills: 11      hydroCHLOROthiazide (HYDRODIURIL) 25 mg tablet Take 1 Tab by mouth daily. Qty: 90 Tab, Refills: 3    Associated Diagnoses: Localized edema      spironolactone (ALDACTONE) 25 mg tablet Take 1 Tab by mouth daily. Qty: 90 Tab, Refills: 3      evolocumab (REPATHA SURECLICK) pen injection 1 mL by SubCUTAneous route every fourteen (14) days. Qty: 2 Pen, Refills: 11    Associated Diagnoses: Coronary artery disease involving coronary bypass graft of native heart without angina pectoris; Mixed hyperlipidemia; Statin intolerance      montelukast (SINGULAIR) 10 mg tablet TAKE ONE TABLET BY MOUTH ONCE DAILY IN THE EVENING  Qty: 30 Tab, Refills: 0      cpap machine kit 40 cm qhs      Omega-3-DHA-EPA-Fish Oil 1,000 mg (120 mg-180 mg) cap Take 1 Tab by mouth daily. fluticasone (FLONASE) 50 mcg/actuation nasal spray 2 Sprays by Both Nostrils route daily. multivitamin (ONE A DAY) tablet Take 1 Tab by mouth daily.                Signed:  Tanvi Gutierrez NP  2/21/2019  7:58 AM

## 2019-02-21 NOTE — PROGRESS NOTES
Cardiac Rehab: Spoke with patient regarding referral to cardiac rehab. Patient meets admission criteria based on PCI (02/20/19). Written information about Cardiac Rehab given and reviewed with patient. Discussed lifestyle modifications to promote cardiac wellness. Pt states he has completed the Cardiac Rehab program twice at the [facility]. Patient indicated that he wants to repeat the cardiac rehab program at [facility] which is closer to his home in [place]. We will forward his referral to the [facility] as requested. His Cardiologist is Dr. Neptali Galeas. Thank you, CHASE SchraderN, RN Cardiopulmonary Rehabilitation Nurse Liaison [facility]

## 2019-02-21 NOTE — PROGRESS NOTES
Bedside report received - patient with slight chest discomfort but describes discomfort as when breathing in not constant. No chest tightness or pressure. Pt aware of s/s to notify staff.

## 2019-02-21 NOTE — PROGRESS NOTES
[facility] CARDIOLOGY PROGRESS NOTE 
      
 
2/21/2019 4:14 PM 
 
Admit Date: 2/20/2019 Subjective: S/p PCI with intermittent HB requiring temp pacer. The patient had pluritic CP overnight that increases with a deep breath. No other complaints overnight. Major CP is gone with no SOB. ROS: 
Cardiovascular:  As noted above Objective:  
  
Vitals:  
 02/21/19 1100 02/21/19 1303 02/21/19 1419 02/21/19 1500 BP: 112/55 115/58 121/57 122/56 Pulse: 81 80 85 81 Resp: 15 19 20 9 Temp: 97.5 °F (36.4 °C) SpO2:      
Weight:      
Height:      
 
 
Physical Exam: 
General-No Acute Distress Neck- supple, no JVD 
CV- regular rate and rhythm no MRG Lung- clear bilaterally Abd- soft, nontender, nondistended Ext- no edema bilaterally. Groin site clean and intact Skin- warm and dry Data Review:  
Recent Labs  
  02/21/19 
0428 02/20/19 
1326 02/20/19 36   --  141  
K 3.5  --  4.1 MG  --   --  2.2 BUN 16  --  24* CREA 0.92  --  1.32  
*  --  122* WBC 10.1 7.3 6.1 HGB 14.3 15.3 16.6 HCT 40.4* 42.4 45.9  192 197 INR  --   --  1.0 Assessment/Plan: Active Problems: 
  Chest pain (2/20/2019) SP Stenting, Pleuritic CP in nature today, Continue Nitro PRN. ASA 81 mg, Plavix 75 mg Daily. Continue BB BID. Will consider ACE/ARB after labs in outpatient setting with 300 ml contrast used with concerns with renal function in the future. Stop Heparin now. Pt is progressing possible DC tomorrow CAD: See above HLD:  Pt is intolerant of statins HTN: off Nitro Drip well controled with BB alone currently Meli Dugan NP 
2/21/2019 4:14 PM

## 2019-02-21 NOTE — PROGRESS NOTES
Dual skin assessment performed with second RN. All areas of skin inspected. No redness or breakdown observed.

## 2019-02-21 NOTE — PROCEDURES
[facility]  CARDIAC CATH    Name:  Shai Leblanc  MR#:  [id number]  :  1945  ACCOUNT #:  [de-identified]  DATE OF SERVICE:  2019    PROCEDURES PERFORMED:  Left heart catheterization, selective coronary angiography,  left ventriculogram with complex percutaneous intervention of the native right  coronary artery and placement of a temporary transvenous pacemaker for intermittent  heart block. INDICATION:  Inferolateral ischemia. There were no complications, but it was a very long approximately 3-hour case due to  the difficulty of intervening on the native right. ACCESS:  Right groin access was used. ANESTHESIA:  The patient received 12 mg of Versed, 200 mcg of fentanyl from 11:15 to  2 p.m. NURSE:  Gerald Gurrola    Elier left 4, Elier right 4, straight pigtail were the diagnostic. The eventual successful interventional catheter was a hockey-stick guide with a   50 wire. FINDINGS:    Left ventriculogram done in CABAN projection shows overall EF 50% with an LVEDP of 18. Left main arises normally, bifurcates into an LAD and circumflex system, the left  main is patent. LAD is occluded proximally after a small-to-medium size diagonal.    Circumflex is flush occluded at the ostium. Right coronary artery is extensively stented. There are two high-grade 90+% lesions  in the proximal mid and mid distal right as well as what appears to be significant  either stenosis or stent abnormality at the ostium of the right. Graft anatomy, the patient previously had vein graft to the right, which is known to  be occluded. There is a vein graft to the OM, which is known to be occluded and a  LIMA to the LAD. The LIMA to the LAD is patent with good runoff. The right coronary artery is intervened on utilizing multiple different guides  including 3DRC, JR4, and hockey-stick and multiple wires.   We were able to wire the  right coronary multiple times, but this was all either through undilated stent or  through the side of the stent. The true ostium of the stent was never able to be  accessed. We were able with 1.5 and 2.0 balloons to dilate the mid RCA, distal RCA  lesions. The patient had then abrupt closure of his RCA several times with then  reopening. Stents were not able to be placed, because of the ostial technical issues  with the previously placed stents. Eventually we were able to get a short 2.0 NC  balloon, then a 3.0 NC, then a 3.5 NC balloon to dilate through the previously placed  stents, which then did allow for two 2.5 x 22 Jeff stents in the mid right. The  proximal right was stented with a 2.5 x 18 Jeff and the ostium was stented with a 3.0  x 18 Jeff, which was then postdilated to 3.25 with a high pressure NC dilatation. Final angiographic results showed JAMEE-3 flow throughout the right coronary artery  with no residual stenosis, clot or lesions. Temp pacer was then removed. The  patient's heart block had resolved. The patient will be sent to post PCI unit.       MD HATTIE [id number]  D:  02/20/2019 14:19  T:  02/21/2019 6:47  JOB #:  [id number]

## 2019-02-22 VITALS
TEMPERATURE: 98.4 F | HEIGHT: 68 IN | SYSTOLIC BLOOD PRESSURE: 110 MMHG | RESPIRATION RATE: 23 BRPM | DIASTOLIC BLOOD PRESSURE: 57 MMHG | WEIGHT: 222.44 LBS | HEART RATE: 85 BPM | OXYGEN SATURATION: 96 % | BODY MASS INDEX: 33.71 KG/M2

## 2019-02-22 LAB
ANION GAP SERPL CALC-SCNC: 9 MMOL/L (ref 7–16)
BUN SERPL-MCNC: 13 MG/DL (ref 8–23)
CALCIUM SERPL-MCNC: 9.1 MG/DL (ref 8.3–10.4)
CHLORIDE SERPL-SCNC: 103 MMOL/L (ref 98–107)
CO2 SERPL-SCNC: 27 MMOL/L (ref 21–32)
CREAT SERPL-MCNC: 1.12 MG/DL (ref 0.8–1.5)
GLUCOSE SERPL-MCNC: 118 MG/DL (ref 65–100)
MAGNESIUM SERPL-MCNC: 2.2 MG/DL (ref 1.8–2.4)
POTASSIUM SERPL-SCNC: 3.7 MMOL/L (ref 3.5–5.1)
SODIUM SERPL-SCNC: 139 MMOL/L (ref 136–145)

## 2019-02-22 PROCEDURE — 80048 BASIC METABOLIC PNL TOTAL CA: CPT

## 2019-02-22 PROCEDURE — 83735 ASSAY OF MAGNESIUM: CPT

## 2019-02-22 PROCEDURE — 36415 COLL VENOUS BLD VENIPUNCTURE: CPT

## 2019-02-22 PROCEDURE — 74011250637 HC RX REV CODE- 250/637: Performed by: INTERNAL MEDICINE

## 2019-02-22 RX ADMIN — Medication 10 ML: at 06:12

## 2019-02-22 RX ADMIN — CLOPIDOGREL 75 MG: 75 TABLET, FILM COATED ORAL at 09:08

## 2019-02-22 RX ADMIN — SPIRONOLACTONE 25 MG: 25 TABLET ORAL at 09:08

## 2019-02-22 RX ADMIN — METOPROLOL TARTRATE 25 MG: 25 TABLET ORAL at 09:08

## 2019-02-22 RX ADMIN — HYDROCHLOROTHIAZIDE 25 MG: 25 TABLET ORAL at 09:08

## 2019-02-22 NOTE — DISCHARGE INSTRUCTIONS
DISCHARGE SUMMARY from Nurse    PATIENT INSTRUCTIONS:    After general anesthesia or intravenous sedation, for 24 hours or while taking prescription Narcotics:  · Limit your activities  · Do not drive and operate hazardous machinery  · Do not make important personal or business decisions  · Do  not drink alcoholic beverages  · If you have not urinated within 8 hours after discharge, please contact your surgeon on call. Report the following to your surgeon:  · Excessive pain, swelling, redness or odor of or around the surgical area  · Temperature over 100.5  · Nausea and vomiting lasting longer than 4 hours or if unable to take medications  · Any signs of decreased circulation or nerve impairment to extremity: change in color, persistent  numbness, tingling, coldness or increase pain  · Any questions    What to do at Home:    *  Please give a list of your current medications to your Primary Care Provider. *  Please update this list whenever your medications are discontinued, doses are      changed, or new medications (including over-the-counter products) are added. *  Please carry medication information at all times in case of emergency situations. These are general instructions for a healthy lifestyle:    No smoking/ No tobacco products/ Avoid exposure to second hand smoke  Surgeon General's Warning:  Quitting smoking now greatly reduces serious risk to your health. Obesity, smoking, and sedentary lifestyle greatly increases your risk for illness    A healthy diet, regular physical exercise & weight monitoring are important for maintaining a healthy lifestyle    You may be retaining fluid if you have a history of heart failure or if you experience any of the following symptoms:  Weight gain of 3 pounds or more overnight or 5 pounds in a week, increased swelling in our hands or feet or shortness of breath while lying flat in bed.   Please call your doctor as soon as you notice any of these symptoms; do not wait until your next office visit. Recognize signs and symptoms of STROKE:    F-face looks uneven    A-arms unable to move or move unevenly    S-speech slurred or non-existent    T-time-call 911 as soon as signs and symptoms begin-DO NOT go       Back to bed or wait to see if you get better-TIME IS BRAIN. Warning Signs of HEART ATTACK     Call 911 if you have these symptoms:   Chest discomfort. Most heart attacks involve discomfort in the center of the chest that lasts more than a few minutes, or that goes away and comes back. It can feel like uncomfortable pressure, squeezing, fullness, or pain.  Discomfort in other areas of the upper body. Symptoms can include pain or discomfort in one or both arms, the back, neck, jaw, or stomach.  Shortness of breath with or without chest discomfort.  Other signs may include breaking out in a cold sweat, nausea, or lightheadedness. Don't wait more than five minutes to call 911 - MINUTES MATTER! Fast action can save your life. Calling 911 is almost always the fastest way to get lifesaving treatment. Emergency Medical Services staff can begin treatment when they arrive -- up to an hour sooner than if someone gets to the hospital by car. The discharge information has been reviewed with the patient. The patient verbalized understanding. Discharge medications reviewed with the patient and appropriate educational materials and side effects teaching were provided.   ___________________________________________________________________________________________________________________________________

## 2019-02-24 LAB
ABO + RH BLD: NORMAL
BLD PROD TYP BPU: NORMAL
BLOOD GROUP ANTIBODIES SERPL: NORMAL
BPU ID: NORMAL
CROSSMATCH RESULT,%XM: NORMAL
SPECIMEN EXP DATE BLD: NORMAL
STATUS OF UNIT,%ST: NORMAL
UNIT DIVISION, %UDIV: 0

## 2019-02-25 ENCOUNTER — PATIENT OUTREACH (OUTPATIENT)
Dept: CASE MANAGEMENT | Age: 74
End: 2019-02-25

## 2019-02-25 NOTE — PROGRESS NOTES
Transition of Care Discharge Follow-up Questionnaire Date/Time of Call: 
 2/25/19 What was the patient hospitalized for? Chest pain, STENT RCA Does the patient understand his/her diagnosis and/or treatment and what happened during the hospitalization? Reports understanding Did the patient receive discharge instructions? Yes  
CM Assessed Risk for Readmission:  
 
 
Patient stated Risk for Readmission:  
 
 low to moderate related to condition No frequent admissions Review any discharge instructions (see discharge instructions/AVS in [facility]). Ask patient if they understand these. Do they have any questions? Diet: low sat fat, low chol, low salt diet Activity: Advance as tolerated (dyspnea, fatigue) Avoid heavy lifting, straining, stooping 5 days OK to shower, do not soak in tub 10 days Report any changes in cath site. ED for chest pain not controlled by NTG Were home services ordered (nursing, PT, OT, ST, etc.)? No order If so, has the first visit occurred? If not, why? (Assist with coordination of services if necessary.) 
 N/A Was any DME ordered? None If so, has it been received? If not, why?  (Assist patient in obtaining DME orders &/or equipment if necessary.) Has CPAP in home already Complete a review of all medications (new, continued and discontinued meds per the D/C instructions and medication tab in [facility]). See medication discharge list. 
 No changes listed. ASA and Plavix every day x one year. Were all new prescriptions filled? If not, why?  (Assist patient in obtaining medications if necessary  escalate for CCM &/or SW if ongoing issues are verbalized by pt or anticipated) N/A Does the patient understand the purpose and dosing instructions for all medications? (If patient has questions, provide explanation and education.) States understanding.   
Does the patient have any problems in performing ADLs? (If patient is unable to perform ADLs  what is the limiting factor(s)? Do they have a support system that can assist? If no support system is present, discuss possible assistance that they may be able to obtain. Escalate for CCM/SW if ongoing issues are verbalized by pt or anticipated) Independent with ADLs. Has family in home if needed. Does the patient have all follow-up appointments scheduled? 7 day f/up with PCP?  
(f/up with PCP may be w/in 14 days if patient has a f/up with their specialist w/in 7 days) 7-14 day f/up with specialist?  
(or per discharge instructions) If f/up has not been made  what actions has the care coordinator made to accomplish this? Has transportation been arranged? PCP/Cardiology 3/8 at 9:15 He will ask Dr. Elliott Ibrahim for recommendations for an internal med PCP. Any other questions or concerns expressed by the patient? NO concerns. Schedule next appointment with JOSE AVINA Coordinator or refer to RN Case Manager/ per the workflow guidelines. When is care coordinators next follow-up call scheduled? If referred for CCM  what RN care manager was the referral assigned? Follow-up call within 30 days.   
LAMONTE Call Completed By: Bayron Burger RN

## 2019-03-28 ENCOUNTER — PATIENT OUTREACH (OUTPATIENT)
Dept: CASE MANAGEMENT | Age: 74
End: 2019-03-28

## 2019-03-29 ENCOUNTER — PATIENT OUTREACH (OUTPATIENT)
Dept: CASE MANAGEMENT | Age: 74
End: 2019-03-29

## 2019-03-29 NOTE — PROGRESS NOTES
Final attempt LAMONTE follow-up. No answer. Chart review shows Mr. Sherry Bernal has attended his provider visits. LAMONTE closed.

## 2019-11-05 ENCOUNTER — HOSPITAL ENCOUNTER (OUTPATIENT)
Dept: LAB | Age: 74
Discharge: HOME | End: 2019-11-05

## 2019-11-05 ENCOUNTER — HOSPITAL ENCOUNTER (OUTPATIENT)
Dept: LAB | Age: 74
Discharge: HOME | End: 2019-11-05
Payer: MEDICARE

## 2019-11-05 DIAGNOSIS — I25.810 CORONARY ARTERY DISEASE INVOLVING CORONARY BYPASS GRAFT OF NATIVE HEART WITHOUT ANGINA PECTORIS: Chronic | ICD-10-CM

## 2019-11-05 DIAGNOSIS — E78.2 MIXED HYPERLIPIDEMIA: Chronic | ICD-10-CM

## 2019-11-05 LAB
ALBUMIN SERPL-MCNC: 3.7 G/DL (ref 3.2–4.6)
ALBUMIN/GLOB SERPL: 1.1 {RATIO} (ref 1.2–3.5)
ALP SERPL-CCNC: 77 U/L (ref 50–136)
ALT SERPL-CCNC: 31 U/L (ref 12–65)
ANION GAP SERPL CALC-SCNC: 7 MMOL/L (ref 7–16)
AST SERPL-CCNC: 25 U/L (ref 15–37)
BASOPHILS # BLD: 0 K/UL (ref 0–0.2)
BASOPHILS NFR BLD: 1 % (ref 0–2)
BILIRUB SERPL-MCNC: 0.9 MG/DL (ref 0.2–1.1)
BUN SERPL-MCNC: 21 MG/DL (ref 8–23)
CALCIUM SERPL-MCNC: 9.6 MG/DL (ref 8.3–10.4)
CHLORIDE SERPL-SCNC: 104 MMOL/L (ref 98–107)
CHOLEST SERPL-MCNC: 197 MG/DL
CO2 SERPL-SCNC: 29 MMOL/L (ref 21–32)
CREAT SERPL-MCNC: 1.3 MG/DL (ref 0.8–1.5)
DIFFERENTIAL METHOD BLD: ABNORMAL
EOSINOPHIL # BLD: 0.1 K/UL (ref 0–0.8)
EOSINOPHIL NFR BLD: 2 % (ref 0.5–7.8)
ERYTHROCYTE [DISTWIDTH] IN BLOOD BY AUTOMATED COUNT: 12.1 % (ref 11.9–14.6)
GLOBULIN SER CALC-MCNC: 3.5 G/DL (ref 2.3–3.5)
GLUCOSE SERPL-MCNC: 106 MG/DL (ref 65–100)
HCT VFR BLD AUTO: 47.8 % (ref 41.1–50.3)
HDLC SERPL-MCNC: 32 MG/DL (ref 40–60)
HDLC SERPL: 6.2 {RATIO}
HGB BLD-MCNC: 16.9 G/DL (ref 13.6–17.2)
IMM GRANULOCYTES # BLD AUTO: 0 K/UL (ref 0–0.5)
IMM GRANULOCYTES NFR BLD AUTO: 0 % (ref 0–5)
LDLC SERPL CALC-MCNC: 85.6 MG/DL
LIPID PROFILE,FLP: ABNORMAL
LYMPHOCYTES # BLD: 1.8 K/UL (ref 0.5–4.6)
LYMPHOCYTES NFR BLD: 28 % (ref 13–44)
MCH RBC QN AUTO: 33.7 PG (ref 26.1–32.9)
MCHC RBC AUTO-ENTMCNC: 35.4 G/DL (ref 31.4–35)
MCV RBC AUTO: 95.2 FL (ref 79.6–97.8)
MONOCYTES # BLD: 0.5 K/UL (ref 0.1–1.3)
MONOCYTES NFR BLD: 7 % (ref 4–12)
NEUTS SEG # BLD: 4 K/UL (ref 1.7–8.2)
NEUTS SEG NFR BLD: 62 % (ref 43–78)
NRBC # BLD: 0 K/UL (ref 0–0.2)
PLATELET # BLD AUTO: 189 K/UL (ref 150–450)
PMV BLD AUTO: 8.2 FL (ref 9.4–12.3)
POTASSIUM SERPL-SCNC: 4.2 MMOL/L (ref 3.5–5.1)
PROT SERPL-MCNC: 7.2 G/DL (ref 6.3–8.2)
RBC # BLD AUTO: 5.02 M/UL (ref 4.23–5.6)
SODIUM SERPL-SCNC: 140 MMOL/L (ref 136–145)
TRIGL SERPL-MCNC: 397 MG/DL (ref 35–150)
VLDLC SERPL CALC-MCNC: 79.4 MG/DL (ref 6–23)
WBC # BLD AUTO: 6.4 K/UL (ref 4.3–11.1)

## 2019-11-05 PROCEDURE — 36415 COLL VENOUS BLD VENIPUNCTURE: CPT

## 2019-11-05 PROCEDURE — 80061 LIPID PANEL: CPT

## 2019-11-05 PROCEDURE — 80053 COMPREHEN METABOLIC PANEL: CPT

## 2019-11-05 PROCEDURE — 85025 COMPLETE CBC W/AUTO DIFF WBC: CPT

## 2019-11-05 NOTE — PROGRESS NOTES
I reviewed results and cholesterol triglycerides are elevated . He would benefit from weight loss diet   also a candidate for Vascepa 2gms bid to lower risk of heart attack and blockage. The would treat his elevated triglycerides.

## 2020-05-19 PROBLEM — E66.01 SEVERE OBESITY (HCC): Status: ACTIVE | Noted: 2020-05-19

## 2022-03-18 PROBLEM — R07.9 CHEST PAIN: Status: ACTIVE | Noted: 2019-02-20

## 2022-03-19 PROBLEM — E66.01 SEVERE OBESITY (HCC): Status: ACTIVE | Noted: 2020-05-19

## 2022-03-19 PROBLEM — I65.21 CAROTID STENOSIS, RIGHT: Status: ACTIVE | Noted: 2017-03-02

## 2022-08-02 ENCOUNTER — OFFICE VISIT (OUTPATIENT)
Dept: CARDIOLOGY CLINIC | Age: 77
End: 2022-08-02
Payer: MEDICARE

## 2022-08-02 VITALS
HEIGHT: 68 IN | BODY MASS INDEX: 36.4 KG/M2 | HEART RATE: 68 BPM | DIASTOLIC BLOOD PRESSURE: 66 MMHG | WEIGHT: 240.2 LBS | SYSTOLIC BLOOD PRESSURE: 132 MMHG

## 2022-08-02 DIAGNOSIS — I65.23 CAROTID STENOSIS, BILATERAL: ICD-10-CM

## 2022-08-02 DIAGNOSIS — I25.10 CORONARY ARTERY DISEASE INVOLVING NATIVE CORONARY ARTERY OF NATIVE HEART WITHOUT ANGINA PECTORIS: Primary | ICD-10-CM

## 2022-08-02 DIAGNOSIS — I10 PRIMARY HYPERTENSION: ICD-10-CM

## 2022-08-02 DIAGNOSIS — I89.0 LYMPHEDEMA: ICD-10-CM

## 2022-08-02 DIAGNOSIS — E78.2 MIXED HYPERLIPIDEMIA: ICD-10-CM

## 2022-08-02 DIAGNOSIS — Z95.1 STATUS POST CORONARY ARTERY BYPASS GRAFT: ICD-10-CM

## 2022-08-02 PROCEDURE — G8428 CUR MEDS NOT DOCUMENT: HCPCS | Performed by: INTERNAL MEDICINE

## 2022-08-02 PROCEDURE — G8417 CALC BMI ABV UP PARAM F/U: HCPCS | Performed by: INTERNAL MEDICINE

## 2022-08-02 PROCEDURE — 1123F ACP DISCUSS/DSCN MKR DOCD: CPT | Performed by: INTERNAL MEDICINE

## 2022-08-02 PROCEDURE — 99214 OFFICE O/P EST MOD 30 MIN: CPT | Performed by: INTERNAL MEDICINE

## 2022-08-02 PROCEDURE — 1036F TOBACCO NON-USER: CPT | Performed by: INTERNAL MEDICINE

## 2022-08-02 PROCEDURE — 93000 ELECTROCARDIOGRAM COMPLETE: CPT | Performed by: INTERNAL MEDICINE

## 2022-08-02 NOTE — PROGRESS NOTES
[facility] CARDIOLOGY  [address] [place], [place] [address]age [facility], [address] [facility], [address] [place] [address]  PHONE: [phone]    NAME: Guicho Evans  : 1945     HPI  Guicho Evans is a 76 y.o. male seen for a follow up visit regarding   Coronary Artery Disease and Hypertension    Coronary Artery Disease    Hypertension     He is here in f/u on his CAD post CABG. He has been doing well except he is limited by his bad hips and lymphedema . He also has chronic back pain that is getting therapy as well. He has been limited by all his problems as well as a diffuse neuropathy of the legs that keep him awake. Past Medical History, Past Surgical History, Family history, Social History, and Medications were all reviewed with the patient today and updated as necessary.        [unfilled]  Allergies   Allergen Reactions    Statins Other (See Comments) and Rash     Joint pain        Past Medical History:   Diagnosis Date    Arthritis     Asymptomatic stenosis of right carotid artery without infarction 2016    CAD (coronary artery disease) 3/26/10    cabg-3    CAD (coronary artery disease) 2002    x 4    Carotid disease, bilateral ([address] 3/25/2010    Chest pain     Chronic pain     rotator cuff pain- bilat    GERD (gastroesophageal reflux disease)     rare    H/O seasonal allergies     Heart failure ([address]     History of left-sided carotid endarterectomy 2016 Dr Christine Macias    HTN (hypertension) 3/25/2010    Hyperlipidemia 3/25/2010    Hypertension     pt denies- does not take meds    NSTEMI (non-ST elevated myocardial infarction) [facility] 3/7/2016    Obesity 3/15/2016    Unspecified sleep apnea     cpap at night      Past Surgical History:   Procedure Laterality Date    CAROTID ENDARTERECTOMY Right 2017    CAROTID ENDARTERECTOMY Left     ERCP      x 4    HEENT      wisdom teeth ext    [facility] CARDIAC SURG PROCEDURE UNLIST      stents, CABG x 3     Family History   Problem Relation Age of Onset    Cancer Mother     Stroke Father     Heart Disease Father     Diabetes Brother     Diabetes Paternal Grandmother     Cancer Brother     Heart Disease Brother       Social History     Tobacco Use    Smoking status: Former     Packs/day: 0.40     Types: Cigarettes     Quit date: 1962     Years since quittin.6    Smokeless tobacco: Never    Tobacco comments:     Quit smoking: quit age 23   Substance Use Topics    Alcohol use: No     Alcohol/week: 0.0 standard drinks     Prior to Admission medications    Medication Sig Start Date End Date Taking?  Authorizing Provider   apixaban (ELIQUIS) 5 MG TABS tablet Take 5 mg by mouth 2 times daily   Yes Ar Automatic Reconciliation   clopidogrel (PLAVIX) 75 MG tablet Take 75 mg by mouth daily 21  Yes Ar Automatic Reconciliation   Evolocumab (REPATHA SURECLICK) 841 MG/ML SOAJ Inject 140 mg into the skin every 14 days 20  Yes Ar Automatic Reconciliation   fluticasone (FLONASE) 50 MCG/ACT nasal spray 2 sprays by Nasal route daily   Yes Ar Automatic Reconciliation   hydroCHLOROthiazide (HYDRODIURIL) 25 MG tablet Take 25 mg by mouth daily 20  Yes Ar Automatic Reconciliation   metoprolol tartrate (LOPRESSOR) 25 MG tablet Take 1 tablet by mouth twice daily 21  Yes Ar Automatic Reconciliation   montelukast (SINGULAIR) 10 MG tablet TAKE ONE TABLET BY MOUTH ONCE DAILY IN THE EVENING 10/8/20  Yes Ar Automatic Reconciliation   niacin (SLO-NIACIN) 500 MG extended release tablet Take 2,000 mg by mouth daily   Yes Ar Automatic Reconciliation   nitroGLYCERIN (NITROSTAT) 0.4 MG SL tablet Place 0.4 mg under the tongue 20  Yes Ar Automatic Reconciliation   pyridoxine (B-6) 100 MG tablet Take 100 mg by mouth 2 times daily   Yes Ar Automatic Reconciliation         ROS            Wt Readings from Last 3 Encounters:   22 240 lb 3.2 oz (109 kg)   21 227 lb 6.4 oz (103.1 kg)     BP Readings from Last 3 Encounters:   22 132/66   21 (!) 140/76 /66   Pulse 68   Ht 5' 8\" (1.727 m)   Wt 240 lb 3.2 oz (109 kg)   BMI 36.52 kg/m²       Physical Exam  Constitutional:       Appearance: Normal appearance. He is obese. Neck:      Vascular: No carotid bruit. Cardiovascular:      Rate and Rhythm: Normal rate and regular rhythm. Heart sounds: Normal heart sounds. Abdominal:      General: Bowel sounds are normal.      Palpations: Abdomen is soft. Skin:     General: Skin is warm and dry. Neurological:      Mental Status: He is alert. EKG-Sinus  Rhythm rate is 68 bpm   Low voltage in precordial leads.    -Old anterior infarct.    -  Nonspecific T-abnormality. Medical problems and test results were reviewed with the patient today. No results found for any visits on 08/02/22. No results found for: HBA1C, [id number]    Lab Results   Component Value Date/Time    WBC 6.4 11/05/2019 11:28 AM    HGB 16.9 11/05/2019 11:28 AM    HCT 47.8 11/05/2019 11:28 AM     11/05/2019 11:28 AM    MCV 95.2 11/05/2019 11:28 AM       Lab Results   Component Value Date/Time     11/05/2019 11:28 AM    K 4.2 11/05/2019 11:28 AM     11/05/2019 11:28 AM    CO2 29 11/05/2019 11:28 AM    BUN 21 11/05/2019 11:28 AM    GFRAA >60 11/05/2019 11:28 AM       Lab Results   Component Value Date/Time    ALT 31 11/05/2019 11:28 AM       Lab Results   Component Value Date/Time    CHOL 197 11/05/2019 11:28 AM    HDL 32 11/05/2019 11:28 AM         ASSESSMENT and Harle Caller was seen today for coronary artery disease and hypertension.     Diagnoses and all orders for this visit:    Coronary artery disease involving native coronary artery of native heart without angina pectoris  Comments:  history of remote CABG   LIMA to LAD patent  Circumflex occluded and RCA heavily stented last instent intervention by Dr Shannon Garcia 2019     HTN (hypertension)  Comments:  reasonable on HCTZ 25mg and Toprol 25mg    Orders:  -     EKG 12 lead    Carotid stenosis, bilateral    Status post coronary artery bypass graft    Mixed hyperlipidemia  Comments:   lipids pending should be taking his Repatha 140mg twice a month but has had variable compliance in the past     Lymphedema    post prostate cancer irradiation   BNP has been normal    Comments:  history of DVT on chronic Eliquis    compression device   no change                             Elizabeth Parks MD  8/2/2022  2:47 PM

## 2022-10-18 ASSESSMENT — ENCOUNTER SYMPTOMS: SHORTNESS OF BREATH: 0

## 2022-10-19 ENCOUNTER — OFFICE VISIT (OUTPATIENT)
Dept: CARDIOLOGY CLINIC | Age: 77
End: 2022-10-19
Payer: MEDICARE

## 2022-10-19 VITALS
HEIGHT: 68 IN | SYSTOLIC BLOOD PRESSURE: 124 MMHG | DIASTOLIC BLOOD PRESSURE: 68 MMHG | HEART RATE: 74 BPM | WEIGHT: 239 LBS | BODY MASS INDEX: 36.22 KG/M2

## 2022-10-19 DIAGNOSIS — I65.23 CAROTID STENOSIS, BILATERAL: ICD-10-CM

## 2022-10-19 DIAGNOSIS — E78.2 MIXED HYPERLIPIDEMIA: ICD-10-CM

## 2022-10-19 DIAGNOSIS — I25.118 CORONARY ARTERY DISEASE OF NATIVE ARTERY OF NATIVE HEART WITH STABLE ANGINA PECTORIS (HCC): ICD-10-CM

## 2022-10-19 DIAGNOSIS — R60.0 LOCALIZED EDEMA: ICD-10-CM

## 2022-10-19 DIAGNOSIS — I10 PRIMARY HYPERTENSION: Primary | ICD-10-CM

## 2022-10-19 PROCEDURE — G8417 CALC BMI ABV UP PARAM F/U: HCPCS | Performed by: INTERNAL MEDICINE

## 2022-10-19 PROCEDURE — G8428 CUR MEDS NOT DOCUMENT: HCPCS | Performed by: INTERNAL MEDICINE

## 2022-10-19 PROCEDURE — 1123F ACP DISCUSS/DSCN MKR DOCD: CPT | Performed by: INTERNAL MEDICINE

## 2022-10-19 PROCEDURE — 99214 OFFICE O/P EST MOD 30 MIN: CPT | Performed by: INTERNAL MEDICINE

## 2022-10-19 PROCEDURE — G8484 FLU IMMUNIZE NO ADMIN: HCPCS | Performed by: INTERNAL MEDICINE

## 2022-10-19 PROCEDURE — 1036F TOBACCO NON-USER: CPT | Performed by: INTERNAL MEDICINE

## 2022-10-19 RX ORDER — ASPIRIN 81 MG/1
81 TABLET ORAL DAILY
Qty: 90 TABLET | Refills: 1
Start: 2022-10-19

## 2022-10-19 RX ORDER — ISOSORBIDE MONONITRATE 30 MG/1
30 TABLET, EXTENDED RELEASE ORAL DAILY
Qty: 90 TABLET | Refills: 3 | Status: SHIPPED | OUTPATIENT
Start: 2022-10-19

## 2022-10-19 RX ORDER — ISOSORBIDE MONONITRATE 30 MG/1
30 TABLET, EXTENDED RELEASE ORAL DAILY
COMMUNITY
End: 2022-10-19 | Stop reason: SDUPTHER

## 2022-10-19 RX ORDER — NITROGLYCERIN 0.4 MG/1
0.4 TABLET SUBLINGUAL EVERY 5 MIN PRN
Qty: 25 TABLET | Refills: 3 | Status: SHIPPED | OUTPATIENT
Start: 2022-10-19

## 2022-10-19 RX ORDER — DOXYCYCLINE HYCLATE 100 MG/1
100 CAPSULE ORAL 2 TIMES DAILY
COMMUNITY

## 2022-10-19 RX ORDER — PRAVASTATIN SODIUM 20 MG
20 TABLET ORAL EVERY EVENING
Qty: 90 TABLET | Refills: 1 | Status: SHIPPED | OUTPATIENT
Start: 2022-10-19

## 2022-10-19 NOTE — PROGRESS NOTES
[address], [address]  PHONE: [phone]    Tali Mueller  1945      SUBJECTIVE:   Lucero Rucker is a 76 y.o. male seen for a follow up visit regarding the following:     Chief Complaint   Patient presents with    Coronary Artery Disease       HPI:    Patient is a 40-year-old male who has been followed in the past by Dr. Cassandra Johnson. He presents today to establish care with a intermediate of his primary cardiologist.  Review of records show that he was recently hospitalized at [facility]. Discharge summary states that he presented with chest pain and ruled in for a non-ST elevation myocardial infarction. Troponin only 0.34. Stress test showed moderate defect in the basal and mid inferolateral location which was partially reversible. He did not undergo cardiac catheterization. He was started on isosorbide and discharged home. Echo done in hospital shows:    Echo at [facility] (10/2/22):  EF 50-54%. Mild LAE. No significant valve abnormalities. Lipid panel in [facility] with Tchol 229, HDL 35 and . Unable to afford repatha. Unable to take statins. Having chest pain after [employer] game. Has lymphedema bad. Having hip pain. Seeing orthopedic surgery. Needs. Lymphedema pump twice a day. Takes NTG rarely       Past Medical History, Past Surgical History, Family history, Social History, and Medications were all reviewed with the patient today and updated as necessary.            Current Outpatient Medications:     isosorbide mononitrate (IMDUR) 30 MG extended release tablet, Take 30 mg by mouth daily, Disp: , Rfl:     doxycycline hyclate (VIBRAMYCIN) 100 MG capsule, Take 100 mg by mouth 2 times daily, Disp: , Rfl:     aspirin EC 81 MG EC tablet, Take 1 tablet by mouth daily, Disp: 90 tablet, Rfl: 1    pravastatin (PRAVACHOL) 20 MG tablet, Take 1 tablet by mouth every evening, Disp: 90 tablet, Rfl: 1    apixaban (ELIQUIS) 5 MG TABS tablet, Take 5 mg by mouth 2 times daily, Disp: , Rfl:     fluticasone (FLONASE) 50 MCG/ACT nasal spray, 2 sprays by Nasal route daily, Disp: , Rfl:     hydroCHLOROthiazide (HYDRODIURIL) 25 MG tablet, Take 25 mg by mouth daily, Disp: , Rfl:     metoprolol tartrate (LOPRESSOR) 25 MG tablet, Take 1 tablet by mouth twice daily, Disp: , Rfl:     montelukast (SINGULAIR) 10 MG tablet, TAKE ONE TABLET BY MOUTH ONCE DAILY IN THE EVENING, Disp: , Rfl:     niacin (SLO-NIACIN) 500 MG extended release tablet, Take 2,000 mg by mouth daily, Disp: , Rfl:     nitroGLYCERIN (NITROSTAT) 0.4 MG SL tablet, Place 0.4 mg under the tongue, Disp: , Rfl:      Allergies   Allergen Reactions    Statins Other (See Comments) and Rash     Joint pain           Patient Active Problem List    Diagnosis Date Noted    Chest pain 02/20/2019     Priority: High    Severe obesity ([address] 05/19/2020     Priority: Low    Carotid stenosis, right 03/02/2017     Priority: Low     3/2/17 (Dr. Sarah Soriano) Right carotid endarterectomy. Obesity (BMI 30-39.9) 10/14/2016     Priority: Low    H/O carotid endarterectomy 07/27/2016     Priority: Low     6/11/2010 Dr Sarah Soriano        Carotid stenosis, bilateral 07/27/2016     Priority: Low     Right CEA 2017:  Dr. Sarah Soriano. Localized edema 04/25/2016     Priority: Low     chronic venous and htn        Bronchitis 03/07/2016     Priority: Low    BLAIR (obstructive sleep apnea) 03/30/2010     Priority: Low    Gastroesophageal reflux disease 03/27/2010     Priority: Low    Hypoxemia 03/27/2010     Priority: Low    Status post coronary artery bypass graft 03/26/2010     Priority: Low    CRI (chronic renal insufficiency) 03/26/2010     Priority: Low    Coronary artery disease involving native coronary artery of native heart without angina pectoris 03/25/2010     Priority: Low     1. CABG 3/26/2010:  LIMA to LAD, SVG to PDA, SVG to OM  2. Review of preop cath note with existing LCX and RCA stents  3.   NSTEMI (3/7/16): Occluded SVG to OM. Unable to open. EF 40% with mid inferior AK. 4. PCI (19): Complex PCI of RCA wiht two 2.5 x 22 mm Dionne SONNY in mRCA, 2.5 x 18 mm Dionne in pRCA and 3 x 18 mm dionne in [address]. KOHLER to LAD patent. Occluded SVG to RCA and OM. Native circ occluded. EF 50%  5. Echo at [facility] (10/2/22):  EF 50-54%. Mild LAE. No significant valve abnormalities. HTN (hypertension) 2010     Priority: Low    Hyperlipidemia 2010     Priority: Low        Social History     Tobacco Use    Smoking status: Former     Packs/day: 0.40     Types: Cigarettes     Quit date: 1962     Years since quittin.8    Smokeless tobacco: Never    Tobacco comments:     Quit smoking: quit age 23   Substance Use Topics    Alcohol use: No     Alcohol/week: 0.0 standard drinks       ROS:    Review of Systems   Constitutional: Negative for malaise/fatigue. Cardiovascular:  Positive for chest pain. Respiratory:  Negative for shortness of breath. Musculoskeletal:  Positive for arthritis. Neurological:  Negative for focal weakness. Psychiatric/Behavioral:  Negative for depression. PHYSICAL EXAM:  Wt Readings from Last 3 Encounters:   10/19/22 239 lb (108.4 kg)   22 240 lb 3.2 oz (109 kg)   21 227 lb 6.4 oz (103.1 kg)     BP Readings from Last 3 Encounters:   10/19/22 124/68   22 132/66   21 (!) 140/76     Pulse Readings from Last 3 Encounters:   10/19/22 74   22 68   21 77       Physical Exam  Constitutional:       General: He is not in acute distress. Neck:      Vascular: No carotid bruit. Cardiovascular:      Rate and Rhythm: Normal rate and regular rhythm. Pulmonary:      Effort: No respiratory distress. Breath sounds: Normal breath sounds. Abdominal:      General: Bowel sounds are normal.      Palpations: Abdomen is soft. Musculoskeletal:         General: No swelling. Skin:     General: Skin is warm and dry.    Neurological: General: No focal deficit present. Psychiatric:         Mood and Affect: Mood normal.       Medical problems and test results were reviewed with the patient today. Lab Results   Component Value Date    WBC 6.4 11/05/2019    HGB 16.9 11/05/2019    HCT 47.8 11/05/2019    MCV 95.2 11/05/2019     11/05/2019       Lab Results   Component Value Date/Time     11/05/2019 11:28 AM    K 4.2 11/05/2019 11:28 AM     11/05/2019 11:28 AM    CO2 29 11/05/2019 11:28 AM    BUN 21 11/05/2019 11:28 AM    CREATININE 1.30 11/05/2019 11:28 AM    GLUCOSE 106 11/05/2019 11:28 AM    CALCIUM 9.6 11/05/2019 11:28 AM        Lab Results   Component Value Date    CHOL 197 11/05/2019     Lab Results   Component Value Date    TRIG 397 (H) 11/05/2019     Lab Results   Component Value Date    HDL 32 (L) 11/05/2019     Lab Results   Component Value Date    LDLCALC 85.6 11/05/2019     Lab Results   Component Value Date    LABVLDL 79.4 (H) 11/05/2019     Lab Results   Component Value Date    CHOLHDLRATIO 6.2 11/05/2019        Data from outside records/labs from outside providers have been reviewed and summarized as noted in the HPI, past history and data review sections of this note       ASSESSMENT and PLAN      1. Coronary artery disease of native artery of native heart with stable angina pectoris [facility])  Discussed findings with patient and wife in regards to recent stress test.  Inferolateral ischemia in the distribution of his known occluded circumflex and prior PCI of RCA. Discussed cardiac catheterization but he had an extremely difficult procedure and is hesitant to proceed unless recurrent symptoms. This appears reasonable as he has not had any recurrent angina with isosorbide over the last 2 weeks. Encouraged to call with any symptoms. Refill of nitroglycerin given. 2. Carotid stenosis, bilateral  No neurologic symptoms. High risk patient and would only check ultrasound if symptoms.     3. Primary

## 2022-11-02 ENCOUNTER — TELEPHONE (OUTPATIENT)
Dept: CARDIOLOGY CLINIC | Age: 77
End: 2022-11-02

## 2022-11-02 NOTE — TELEPHONE ENCOUNTER
MEDICATION REFILL REQUEST      Name of Medication:  Isosorbide Mononitrate  Dose:  30 mg  Frequency:  1 pill a day  Quantity:  90  Days' supply:  90 with 3 refills       Pharmacy Name/Location:  [phone]  Pt is out of meds please call in asap

## 2022-11-02 NOTE — TELEPHONE ENCOUNTER
Left detailed message on voice mail that prescription was called into [facility] in [place] on 10-19-22.  Can Call [facility] and have prescription transferred to [facility] if that is their preferred pharmacy//maryab

## 2023-02-03 ENCOUNTER — TELEPHONE (OUTPATIENT)
Dept: CARDIOLOGY CLINIC | Age: 78
End: 2023-02-03

## 2023-02-03 NOTE — TELEPHONE ENCOUNTER
MEDICATION REFILL REQUEST      Name of Medication:  metoprolol tartrate  Dose:  25 mg  Frequency:  1 tab twice daily  Quantity:  ?  Days' supply:  ?       Pharmacy Name/Location:  [facility] in [facility]

## 2023-02-03 NOTE — TELEPHONE ENCOUNTER
Prescription sent to pharmacy.  Patient has appt 2-8-23 will refill for 90 days at that time//brendab

## 2023-02-08 ENCOUNTER — OFFICE VISIT (OUTPATIENT)
Dept: CARDIOLOGY CLINIC | Age: 78
End: 2023-02-08
Payer: MEDICARE

## 2023-02-08 VITALS
HEIGHT: 68 IN | BODY MASS INDEX: 37.1 KG/M2 | DIASTOLIC BLOOD PRESSURE: 58 MMHG | WEIGHT: 244.8 LBS | SYSTOLIC BLOOD PRESSURE: 100 MMHG | HEART RATE: 70 BPM

## 2023-02-08 DIAGNOSIS — E78.2 MIXED HYPERLIPIDEMIA: ICD-10-CM

## 2023-02-08 DIAGNOSIS — I25.10 CORONARY ARTERY DISEASE INVOLVING NATIVE CORONARY ARTERY OF NATIVE HEART WITHOUT ANGINA PECTORIS: Primary | ICD-10-CM

## 2023-02-08 DIAGNOSIS — I10 PRIMARY HYPERTENSION: ICD-10-CM

## 2023-02-08 DIAGNOSIS — Z86.718 HISTORY OF DVT (DEEP VEIN THROMBOSIS): ICD-10-CM

## 2023-02-08 DIAGNOSIS — R60.0 LOCALIZED EDEMA: ICD-10-CM

## 2023-02-08 PROCEDURE — 3074F SYST BP LT 130 MM HG: CPT | Performed by: INTERNAL MEDICINE

## 2023-02-08 PROCEDURE — G8427 DOCREV CUR MEDS BY ELIG CLIN: HCPCS | Performed by: INTERNAL MEDICINE

## 2023-02-08 PROCEDURE — 99214 OFFICE O/P EST MOD 30 MIN: CPT | Performed by: INTERNAL MEDICINE

## 2023-02-08 PROCEDURE — 1036F TOBACCO NON-USER: CPT | Performed by: INTERNAL MEDICINE

## 2023-02-08 PROCEDURE — 1123F ACP DISCUSS/DSCN MKR DOCD: CPT | Performed by: INTERNAL MEDICINE

## 2023-02-08 PROCEDURE — 3078F DIAST BP <80 MM HG: CPT | Performed by: INTERNAL MEDICINE

## 2023-02-08 PROCEDURE — G8484 FLU IMMUNIZE NO ADMIN: HCPCS | Performed by: INTERNAL MEDICINE

## 2023-02-08 PROCEDURE — G8417 CALC BMI ABV UP PARAM F/U: HCPCS | Performed by: INTERNAL MEDICINE

## 2023-02-08 ASSESSMENT — ENCOUNTER SYMPTOMS
BACK PAIN: 1
SHORTNESS OF BREATH: 0

## 2023-02-08 NOTE — PROGRESS NOTES
[address] [place]  [address], [address]  PHONE: [phone]    Anny Mueller  1945      SUBJECTIVE:   Dianne Kraft is a 76 y.o. male seen for a follow up visit regarding the following:     Chief Complaint   Patient presents with    Coronary Artery Disease       HPI:    Dianne Kraft presents for routine follow up for known Coronary Artery Disease. Multiple issues addressed as outlined below:     Coronary Artery Disease:  Patient denies any recent angina. Notes compliance with medical therapy. No recent NTG use. No intolerance to anti-platelet therapy. No blood in stool or melena. Hypertension:  Ambulatory BP readings have been low. Patient reports compliance with medical therapy without side effects. Hyperlipidemia:   Hoping to get coverage of repatha. Unable to take statin. Lympedema pump twice a day. Severe pain from edema in hip and back. Past Medical History, Past Surgical History, Family history, Social History, and Medications were all reviewed with the patient today and updated as necessary.            Current Outpatient Medications:     metoprolol tartrate (LOPRESSOR) 25 MG tablet, Take 1 tablet by mouth 2 times daily Take 1 tablet by mouth twice daily, Disp: 60 tablet, Rfl: 0    aspirin EC 81 MG EC tablet, Take 1 tablet by mouth daily, Disp: 90 tablet, Rfl: 1    nitroGLYCERIN (NITROSTAT) 0.4 MG SL tablet, Place 1 tablet under the tongue every 5 minutes as needed for Chest pain, Disp: 25 tablet, Rfl: 3    isosorbide mononitrate (IMDUR) 30 MG extended release tablet, Take 1 tablet by mouth daily, Disp: 90 tablet, Rfl: 3    apixaban (ELIQUIS) 5 MG TABS tablet, Take 5 mg by mouth 2 times daily, Disp: , Rfl:     fluticasone (FLONASE) 50 MCG/ACT nasal spray, 2 sprays by Nasal route daily, Disp: , Rfl:     montelukast (SINGULAIR) 10 MG tablet, TAKE ONE TABLET BY MOUTH ONCE DAILY IN THE EVENING, Disp: , Rfl:     niacin (SLO-NIACIN) 500 MG extended release tablet, Take 2,000 mg by mouth daily, Disp: , Rfl:     pravastatin (PRAVACHOL) 20 MG tablet, Take 1 tablet by mouth every evening (Patient not taking: Reported on 2/8/2023), Disp: 90 tablet, Rfl: 1     Allergies   Allergen Reactions    Statins Other (See Comments) and Rash     Joint pain           Patient Active Problem List    Diagnosis Date Noted    History of DVT (deep vein thrombosis) 02/08/2023     Priority: High     On ELiquis 5 mg BID. Chest pain 02/20/2019     Priority: High    Severe obesity ([address] 05/19/2020     Priority: Low    Carotid stenosis, right 03/02/2017     Priority: Low     3/2/17 (Dr. Juan Wei) Right carotid endarterectomy. Obesity (BMI 30-39.9) 10/14/2016     Priority: Low    H/O carotid endarterectomy 07/27/2016     Priority: Low     6/11/2010 Dr Juan Wei        Carotid stenosis, bilateral 07/27/2016     Priority: Low     Right CEA 2017:  Dr. Juan Wei. Localized edema 04/25/2016     Priority: Low     chronic venous and htn        Bronchitis 03/07/2016     Priority: Low    BLAIR (obstructive sleep apnea) 03/30/2010     Priority: Low    Gastroesophageal reflux disease 03/27/2010     Priority: Low    Hypoxemia 03/27/2010     Priority: Low    Status post coronary artery bypass graft 03/26/2010     Priority: Low    CRI (chronic renal insufficiency) 03/26/2010     Priority: Low    Coronary artery disease involving native coronary artery of native heart without angina pectoris 03/25/2010     Priority: Low     1. CABG 3/26/2010:  LIMA to LAD, SVG to PDA, SVG to OM  2. Review of preop cath note with existing LCX and RCA stents  3. NSTEMI (3/7/16): Occluded SVG to OM. Unable to open. EF 40% with mid inferior AK. 4. PCI (2/20/19): Complex PCI of RCA wiht two 2.5 x 22 mm Dionne SONNY in mRCA, 2.5 x 18 mm Dionne in pRCA and 3 x 18 mm dionne in [address]. KOHLER to LAD patent. Occluded SVG to RCA and OM. Native circ occluded. EF 50%  5. Echo at [facility] (10/2/22):  EF 50-54%. Mild LAE. No significant valve abnormalities. 6.  Stress test @ OSH 10/22:  moderate defect in the basal and mid inferolateral location which was partially reversible. Patient declined repeat cath unless worsening symptoms. HTN (hypertension) 2010     Priority: Low    Hyperlipidemia 2010     Priority: Low        Social History     Tobacco Use    Smoking status: Former     Packs/day: 0.40     Types: Cigarettes     Quit date: 1962     Years since quittin.1    Smokeless tobacco: Never    Tobacco comments:     Quit smoking: quit age 23   Substance Use Topics    Alcohol use: No     Alcohol/week: 0.0 standard drinks       ROS:    Review of Systems   Constitutional: Positive for malaise/fatigue. Cardiovascular:  Positive for leg swelling. Negative for chest pain. Respiratory:  Negative for shortness of breath. Musculoskeletal:  Positive for arthritis and back pain. Neurological:  Negative for focal weakness. Psychiatric/Behavioral:  Negative for depression. PHYSICAL EXAM:  Wt Readings from Last 3 Encounters:   23 244 lb 12.8 oz (111 kg)   10/19/22 239 lb (108.4 kg)   22 240 lb 3.2 oz (109 kg)     BP Readings from Last 3 Encounters:   23 (!) 100/58   10/19/22 124/68   22 132/66     Pulse Readings from Last 3 Encounters:   23 70   10/19/22 74   22 68       Physical Exam  Constitutional:       General: He is not in acute distress. Neck:      Vascular: No carotid bruit. Cardiovascular:      Rate and Rhythm: Normal rate and regular rhythm. Pulmonary:      Effort: No respiratory distress. Breath sounds: Normal breath sounds. Abdominal:      General: Bowel sounds are normal.      Palpations: Abdomen is soft. Musculoskeletal:         General: Swelling present. Skin:     General: Skin is warm and dry. Neurological:      General: No focal deficit present.    Psychiatric:         Mood and Affect: Mood normal.       Medical problems and test results were reviewed with the patient today. Lab Results   Component Value Date    WBC 6.4 11/05/2019    HGB 16.9 11/05/2019    HCT 47.8 11/05/2019    MCV 95.2 11/05/2019     11/05/2019       Lab Results   Component Value Date/Time     11/05/2019 11:28 AM    K 4.2 11/05/2019 11:28 AM     11/05/2019 11:28 AM    CO2 29 11/05/2019 11:28 AM    BUN 21 11/05/2019 11:28 AM    CREATININE 1.30 11/05/2019 11:28 AM    GLUCOSE 106 11/05/2019 11:28 AM    CALCIUM 9.6 11/05/2019 11:28 AM        Lab Results   Component Value Date    CHOL 197 11/05/2019     Lab Results   Component Value Date    TRIG 397 (H) 11/05/2019     Lab Results   Component Value Date    HDL 32 (L) 11/05/2019     Lab Results   Component Value Date    LDLCALC 85.6 11/05/2019     Lab Results   Component Value Date    LABVLDL 79.4 (H) 11/05/2019     Lab Results   Component Value Date    CHOLHDLRATIO 6.2 11/05/2019        Data from outside records/labs from outside providers have been reviewed and summarized as noted in the HPI, past history and data review sections of this note       ASSESSMENT and PLAN      1. Coronary artery disease involving native coronary artery of native heart without angina pectoris  No angina. Continue aspirin. 2. Mixed hyperlipidemia  Working with insurance to see if he can get restarted on Repatha. Intolerant of statins. 3. History of DVT (deep vein thrombosis)  Continue Eliquis. 4. Primary hypertension  Blood pressure low. Continue metoprolol if possible. 5. Localized edema  Continue lymphedema pumps          Return in about 6 months (around 8/8/2023). Carlos Babcock MD  2/8/2023  3:38 PM    This note may have been dictated using speech recognition software.   As a result, error of speech recognition may have occurred

## 2023-02-10 ENCOUNTER — TELEPHONE (OUTPATIENT)
Dept: CARDIOLOGY CLINIC | Age: 78
End: 2023-02-10

## 2023-02-10 NOTE — TELEPHONE ENCOUNTER
MEDICATION REFILL REQUEST      Name of Medication:  Repatha   Dose:    Frequency:    Quantity:    Days' supply:  90      [facility]

## 2023-02-10 NOTE — TELEPHONE ENCOUNTER
Patient called stating that his insurance company said that his repatha would be $39.00 and would like prescription sent to pharmacy. Per office note of 2-8-23 Working with insurance to see if he can get restarted on [address] [place] [address].   Intolerant of statins  [facility] to [facility]//ramsey  Requested Prescriptions     Signed Prescriptions Disp Refills    Evolocumab 140 MG/ML SOAJ 6 Adjustable Dose Pre-filled Pen Syringe 3     Sig: Inject 140 mg into the skin every 14 days     Authorizing Provider: Alejandra Nagy     Ordering User: Ann-Marie Valdez

## 2023-02-13 ENCOUNTER — TELEPHONE (OUTPATIENT)
Dept: CARDIOLOGY CLINIC | Age: 78
End: 2023-02-13

## 2023-02-14 ENCOUNTER — TELEPHONE (OUTPATIENT)
Dept: CARDIOLOGY CLINIC | Age: 78
End: 2023-02-14

## 2023-02-14 NOTE — TELEPHONE ENCOUNTER
PA for Dl approved from 1- to 2-14, 2024  Patient and pharmacy notified.  Patient also informed that he does not change any of his medications//brendab

## 2023-02-14 NOTE — TELEPHONE ENCOUNTER
Pt state that he is suppose to start RX Repatha, pt is on a couple other medications and wonders if started Repatha back should there be any medications left out that he is currently taking? Pt would appreciate a call back.

## 2023-03-22 NOTE — TELEPHONE ENCOUNTER
Prescription sent to pharmacy//patrickndab  Requested Prescriptions     Signed Prescriptions Disp Refills    metoprolol tartrate (LOPRESSOR) 25 MG tablet 180 tablet 3     Sig: Take 1 tablet by mouth twice daily     Authorizing Provider: Galilea Sommers     Ordering User: Michael Sánchez

## 2023-05-24 ENCOUNTER — TELEPHONE (OUTPATIENT)
Dept: CARDIOLOGY CLINIC | Age: 78
End: 2023-05-24

## 2023-05-24 NOTE — TELEPHONE ENCOUNTER
Wife called stating that [facility] had faxed over for an approval for [address]. It now has been denied as we did not respond. [address] is going to be over $500. Wife informed that I have not received any faxes from [facility]. If they sent it to the central fax, it takes a day or so to get a fax.  I gave [phone] to have [facility] fax over request. Wife voiced understanding and thanked me//ramsey

## 2023-05-30 ENCOUNTER — TELEPHONE (OUTPATIENT)
Age: 78
End: 2023-05-30

## 2023-05-30 NOTE — TELEPHONE ENCOUNTER
Per Ashley Fleming, she spoke with Lana Boyer PA representative in the appeals department. She states that patient did not need PA. He is in the donut hole. Patient tried to do a tier exception hiself. It was denied because there are not toher drugs int hat class on a lower cost tier. He already has is offered the lowest tier. He needs to contact his insurance company to see if there is another medication he can take. Patient called with above. Patient states that he knew he was in the donut hole, \"not going to pay $600 for medication\". Patient states that he will check with the insurance company.  Patient informed that we will be glad to assist in what the insurance company request. Patient thanked me//ramsey

## 2023-08-28 ENCOUNTER — OFFICE VISIT (OUTPATIENT)
Age: 78
End: 2023-08-28
Payer: MEDICARE

## 2023-08-28 VITALS
HEIGHT: 68 IN | HEART RATE: 65 BPM | SYSTOLIC BLOOD PRESSURE: 126 MMHG | BODY MASS INDEX: 36.07 KG/M2 | WEIGHT: 238 LBS | DIASTOLIC BLOOD PRESSURE: 68 MMHG

## 2023-08-28 DIAGNOSIS — Z86.718 HISTORY OF DVT (DEEP VEIN THROMBOSIS): ICD-10-CM

## 2023-08-28 DIAGNOSIS — I65.23 CAROTID STENOSIS, BILATERAL: ICD-10-CM

## 2023-08-28 DIAGNOSIS — I25.10 CORONARY ARTERY DISEASE INVOLVING NATIVE CORONARY ARTERY OF NATIVE HEART WITHOUT ANGINA PECTORIS: Primary | ICD-10-CM

## 2023-08-28 DIAGNOSIS — E78.2 MIXED HYPERLIPIDEMIA: ICD-10-CM

## 2023-08-28 DIAGNOSIS — E66.01 SEVERE OBESITY (HCC): ICD-10-CM

## 2023-08-28 PROCEDURE — G8428 CUR MEDS NOT DOCUMENT: HCPCS | Performed by: INTERNAL MEDICINE

## 2023-08-28 PROCEDURE — 3078F DIAST BP <80 MM HG: CPT | Performed by: INTERNAL MEDICINE

## 2023-08-28 PROCEDURE — 3074F SYST BP LT 130 MM HG: CPT | Performed by: INTERNAL MEDICINE

## 2023-08-28 PROCEDURE — 1036F TOBACCO NON-USER: CPT | Performed by: INTERNAL MEDICINE

## 2023-08-28 PROCEDURE — 93000 ELECTROCARDIOGRAM COMPLETE: CPT | Performed by: INTERNAL MEDICINE

## 2023-08-28 PROCEDURE — 99214 OFFICE O/P EST MOD 30 MIN: CPT | Performed by: INTERNAL MEDICINE

## 2023-08-28 PROCEDURE — 1123F ACP DISCUSS/DSCN MKR DOCD: CPT | Performed by: INTERNAL MEDICINE

## 2023-08-28 PROCEDURE — G8417 CALC BMI ABV UP PARAM F/U: HCPCS | Performed by: INTERNAL MEDICINE

## 2023-08-28 ASSESSMENT — ENCOUNTER SYMPTOMS: SHORTNESS OF BREATH: 0

## 2023-08-28 NOTE — PROGRESS NOTES
abnormalities. 6.  Stress test @ OSH 10/22:  moderate defect in the basal and mid inferolateral location which was partially reversible. Patient declined repeat cath unless worsening symptoms. HTN (hypertension) 2010     Priority: Low    Hyperlipidemia 2010     Priority: Low        Social History     Tobacco Use    Smoking status: Former     Packs/day: 0.40     Types: Cigarettes     Quit date: 1962     Years since quittin.6    Smokeless tobacco: Never    Tobacco comments:     Quit smoking: quit age 23   Substance Use Topics    Alcohol use: No     Alcohol/week: 0.0 standard drinks       ROS:    Review of Systems   Constitutional: Negative for malaise/fatigue. Cardiovascular:  Positive for chest pain. Respiratory:  Negative for shortness of breath. Musculoskeletal:  Positive for arthritis. Neurological:  Negative for focal weakness. Psychiatric/Behavioral:  Negative for depression. PHYSICAL EXAM:  Wt Readings from Last 3 Encounters:   23 238 lb (108 kg)   23 244 lb 12.8 oz (111 kg)   10/19/22 239 lb (108.4 kg)     BP Readings from Last 3 Encounters:   23 126/68   23 (!) 100/58   10/19/22 124/68     Pulse Readings from Last 3 Encounters:   23 65   23 70   10/19/22 74       Physical Exam  Constitutional:       General: He is not in acute distress. Neck:      Vascular: No carotid bruit. Cardiovascular:      Rate and Rhythm: Normal rate and regular rhythm. Pulmonary:      Effort: No respiratory distress. Breath sounds: Normal breath sounds. Abdominal:      General: Bowel sounds are normal.      Palpations: Abdomen is soft. Musculoskeletal:         General: No swelling. Skin:     General: Skin is warm and dry. Neurological:      General: No focal deficit present. Psychiatric:         Mood and Affect: Mood normal.       Medical problems and test results were reviewed with the patient today.        Lab Results

## 2023-09-28 ENCOUNTER — TELEPHONE (OUTPATIENT)
Age: 78
End: 2023-09-28

## 2023-09-28 DIAGNOSIS — I65.29 CAROTID STENOSIS: Primary | ICD-10-CM

## 2023-09-28 NOTE — TELEPHONE ENCOUNTER
Patient called with results, agrees to see vascular surgeon  . Order entered//brendab    ----- Message from Michael Jimenez MD sent at 9/27/2023  6:47 PM EDT -----  Please call patient. CArotid suggest significant left carotid stenosis. Need to refer back to vasculat surgery for evaluation. Send in request for consult.    Thank you

## 2023-10-09 ENCOUNTER — OFFICE VISIT (OUTPATIENT)
Dept: VASCULAR SURGERY | Age: 78
End: 2023-10-09
Payer: MEDICARE

## 2023-10-09 VITALS
SYSTOLIC BLOOD PRESSURE: 105 MMHG | DIASTOLIC BLOOD PRESSURE: 68 MMHG | HEIGHT: 68 IN | OXYGEN SATURATION: 96 % | WEIGHT: 232 LBS | HEART RATE: 69 BPM | BODY MASS INDEX: 35.16 KG/M2

## 2023-10-09 DIAGNOSIS — I25.10 CORONARY ARTERY DISEASE INVOLVING NATIVE CORONARY ARTERY OF NATIVE HEART WITHOUT ANGINA PECTORIS: Primary | ICD-10-CM

## 2023-10-09 PROCEDURE — 99204 OFFICE O/P NEW MOD 45 MIN: CPT | Performed by: STUDENT IN AN ORGANIZED HEALTH CARE EDUCATION/TRAINING PROGRAM

## 2023-10-09 PROCEDURE — 1036F TOBACCO NON-USER: CPT | Performed by: STUDENT IN AN ORGANIZED HEALTH CARE EDUCATION/TRAINING PROGRAM

## 2023-10-09 PROCEDURE — 3078F DIAST BP <80 MM HG: CPT | Performed by: STUDENT IN AN ORGANIZED HEALTH CARE EDUCATION/TRAINING PROGRAM

## 2023-10-09 PROCEDURE — 3074F SYST BP LT 130 MM HG: CPT | Performed by: STUDENT IN AN ORGANIZED HEALTH CARE EDUCATION/TRAINING PROGRAM

## 2023-10-09 PROCEDURE — G8427 DOCREV CUR MEDS BY ELIG CLIN: HCPCS | Performed by: STUDENT IN AN ORGANIZED HEALTH CARE EDUCATION/TRAINING PROGRAM

## 2023-10-09 PROCEDURE — 1123F ACP DISCUSS/DSCN MKR DOCD: CPT | Performed by: STUDENT IN AN ORGANIZED HEALTH CARE EDUCATION/TRAINING PROGRAM

## 2023-10-09 PROCEDURE — G8484 FLU IMMUNIZE NO ADMIN: HCPCS | Performed by: STUDENT IN AN ORGANIZED HEALTH CARE EDUCATION/TRAINING PROGRAM

## 2023-10-09 PROCEDURE — G8417 CALC BMI ABV UP PARAM F/U: HCPCS | Performed by: STUDENT IN AN ORGANIZED HEALTH CARE EDUCATION/TRAINING PROGRAM

## 2023-10-18 NOTE — PROGRESS NOTES
VASCULAR SURGERY   [address], [address]  [phone] FAX: [phone]        Anita Montejo  : 1945    Reason for visit: Asymptomatic left carotid stenosis     Chief Complaint: 78 y.o. male presents with asymptomatic left carotid stenosis. Denies slurred speech, unilateral weakness and mono-occular vision loss. Compliant with ASA and Eliquis. Intolerant to statins. Unable to afford Repatha. Non-smoker. Jhonatan Valadez right CEA 2017. DUS with EDV <100cm/s which would classify as <70% stenosis. Plan:   Asymptomatic left carotid stenosis <70%: Would recommend continued medical management ASA and Repatha when he can afford it. Repeat carotid DUS in 6 months. Level 4 Progression of chronic illness     Imaging interrupted:   Carotid DUS    Mild and homogeneous plaque (proximal) in the right internal carotid artery. There is homogeneous smooth mild severity plaque extending from the distal common carotid into the bulb and proximal ICA at the site of prior carotid endarterectomy but no significant stenosis is identified. Severe (70-79%) stenosis in the left internal carotid artery. Severe, calcific and irregular plaque (proximal) in the left internal carotid artery. There is shadowing making interpretation somewhat difficult but there is significant calcified plaque in the carotid bulb extending into the ostium of both the internal carotid and external carotid with elevated velocities consistent with significant stenosis. Clinical correlation recommended. Normal antegrade flow involving the right vertebral artery. Normal antegrade flow involving the left vertebral artery. Thyroid appears both cystic and nodular, especially on the right. Clinical correlation recommended. Constitutional:   Negative for fevers and unexplained weight loss. Eyes:   Negative for visual disturbance. ENT:   Negative for significant hearing loss and tinnitus.   Respiratory:

## 2023-10-30 RX ORDER — ISOSORBIDE MONONITRATE 30 MG/1
30 TABLET, EXTENDED RELEASE ORAL DAILY
Qty: 90 TABLET | Refills: 1 | Status: SHIPPED | OUTPATIENT
Start: 2023-10-30

## 2023-10-30 NOTE — TELEPHONE ENCOUNTER
Prescription sent to pharmacy//patrickndab  Requested Prescriptions     Signed Prescriptions Disp Refills    isosorbide mononitrate (IMDUR) 30 MG extended release tablet 90 tablet 1     Sig: Take 1 tablet by mouth once daily     Authorizing Provider: Saintclair Haggard     Ordering User: Bob Emmanuel

## 2024-01-01 ENCOUNTER — HOSPITAL ENCOUNTER (INPATIENT)
Age: 79
LOS: 12 days | Discharge: NURSING, REHAB OR LONG-TERM CARE | DRG: 291 | End: 2024-12-07
Attending: EMERGENCY MEDICINE | Admitting: INTERNAL MEDICINE
Payer: MEDICARE

## 2024-01-01 ENCOUNTER — APPOINTMENT (OUTPATIENT)
Dept: GENERAL RADIOLOGY | Age: 79
DRG: 291 | End: 2024-01-01
Payer: MEDICARE

## 2024-01-01 VITALS
HEIGHT: 68 IN | OXYGEN SATURATION: 96 % | SYSTOLIC BLOOD PRESSURE: 106 MMHG | DIASTOLIC BLOOD PRESSURE: 63 MMHG | WEIGHT: 219 LBS | TEMPERATURE: 97.5 F | HEART RATE: 77 BPM | RESPIRATION RATE: 16 BRPM | BODY MASS INDEX: 33.19 KG/M2

## 2024-01-01 DIAGNOSIS — R06.00 DYSPNEA, UNSPECIFIED TYPE: Primary | ICD-10-CM

## 2024-01-01 DIAGNOSIS — I50.23 ACUTE ON CHRONIC SYSTOLIC (CONGESTIVE) HEART FAILURE (HCC): ICD-10-CM

## 2024-01-01 LAB
ALBUMIN SERPL-MCNC: 3 G/DL (ref 3.2–4.6)
ALBUMIN/GLOB SERPL: 0.9 (ref 1–1.9)
ALP SERPL-CCNC: 65 U/L (ref 40–129)
ALT SERPL-CCNC: 13 U/L (ref 8–55)
ANION GAP SERPL CALC-SCNC: 10 MMOL/L (ref 7–16)
ANION GAP SERPL CALC-SCNC: 11 MMOL/L (ref 7–16)
ANION GAP SERPL CALC-SCNC: 12 MMOL/L (ref 7–16)
ANION GAP SERPL CALC-SCNC: 15 MMOL/L (ref 7–16)
ANION GAP SERPL CALC-SCNC: 16 MMOL/L (ref 7–16)
ANION GAP SERPL CALC-SCNC: 9 MMOL/L (ref 7–16)
ANION GAP SERPL CALC-SCNC: 9 MMOL/L (ref 7–16)
AST SERPL-CCNC: 29 U/L (ref 15–37)
B PERT DNA SPEC QL NAA+PROBE: NOT DETECTED
BACTERIA SPEC CULT: NORMAL
BASOPHILS # BLD: 0 K/UL (ref 0–0.2)
BASOPHILS NFR BLD: 0 % (ref 0–2)
BASOPHILS NFR BLD: 1 % (ref 0–2)
BILIRUB DIRECT SERPL-MCNC: <0.2 MG/DL (ref 0–0.4)
BILIRUB SERPL-MCNC: 0.3 MG/DL (ref 0–1.2)
BORDETELLA PARAPERTUSSIS BY PCR: NOT DETECTED
BUN SERPL-MCNC: 18 MG/DL (ref 8–23)
BUN SERPL-MCNC: 19 MG/DL (ref 8–23)
BUN SERPL-MCNC: 21 MG/DL (ref 8–23)
BUN SERPL-MCNC: 21 MG/DL (ref 8–23)
BUN SERPL-MCNC: 22 MG/DL (ref 8–23)
BUN SERPL-MCNC: 23 MG/DL (ref 8–23)
BUN SERPL-MCNC: 26 MG/DL (ref 8–23)
BUN SERPL-MCNC: 27 MG/DL (ref 8–23)
BUN SERPL-MCNC: 29 MG/DL (ref 8–23)
BUN SERPL-MCNC: 29 MG/DL (ref 8–23)
BUN SERPL-MCNC: 31 MG/DL (ref 8–23)
C PNEUM DNA SPEC QL NAA+PROBE: NOT DETECTED
CALCIUM SERPL-MCNC: 8.7 MG/DL (ref 8.8–10.2)
CALCIUM SERPL-MCNC: 8.8 MG/DL (ref 8.8–10.2)
CALCIUM SERPL-MCNC: 8.9 MG/DL (ref 8.8–10.2)
CALCIUM SERPL-MCNC: 9 MG/DL (ref 8.8–10.2)
CALCIUM SERPL-MCNC: 9.1 MG/DL (ref 8.8–10.2)
CALCIUM SERPL-MCNC: 9.1 MG/DL (ref 8.8–10.2)
CALCIUM SERPL-MCNC: 9.2 MG/DL (ref 8.8–10.2)
CALCIUM SERPL-MCNC: 9.2 MG/DL (ref 8.8–10.2)
CHLORIDE SERPL-SCNC: 101 MMOL/L (ref 98–107)
CHLORIDE SERPL-SCNC: 101 MMOL/L (ref 98–107)
CHLORIDE SERPL-SCNC: 102 MMOL/L (ref 98–107)
CHLORIDE SERPL-SCNC: 93 MMOL/L (ref 98–107)
CHLORIDE SERPL-SCNC: 94 MMOL/L (ref 98–107)
CHLORIDE SERPL-SCNC: 94 MMOL/L (ref 98–107)
CHLORIDE SERPL-SCNC: 95 MMOL/L (ref 98–107)
CHLORIDE SERPL-SCNC: 96 MMOL/L (ref 98–107)
CHLORIDE SERPL-SCNC: 97 MMOL/L (ref 98–107)
CHLORIDE SERPL-SCNC: 98 MMOL/L (ref 98–107)
CHLORIDE SERPL-SCNC: 99 MMOL/L (ref 98–107)
CO2 SERPL-SCNC: 24 MMOL/L (ref 20–29)
CO2 SERPL-SCNC: 27 MMOL/L (ref 20–29)
CO2 SERPL-SCNC: 27 MMOL/L (ref 20–29)
CO2 SERPL-SCNC: 28 MMOL/L (ref 20–29)
CO2 SERPL-SCNC: 29 MMOL/L (ref 20–29)
CO2 SERPL-SCNC: 29 MMOL/L (ref 20–29)
CO2 SERPL-SCNC: 30 MMOL/L (ref 20–29)
CO2 SERPL-SCNC: 31 MMOL/L (ref 20–29)
CO2 SERPL-SCNC: 32 MMOL/L (ref 20–29)
CREAT SERPL-MCNC: 1.22 MG/DL (ref 0.8–1.3)
CREAT SERPL-MCNC: 1.29 MG/DL (ref 0.8–1.3)
CREAT SERPL-MCNC: 1.31 MG/DL (ref 0.8–1.3)
CREAT SERPL-MCNC: 1.33 MG/DL (ref 0.8–1.3)
CREAT SERPL-MCNC: 1.34 MG/DL (ref 0.8–1.3)
CREAT SERPL-MCNC: 1.38 MG/DL (ref 0.8–1.3)
CREAT SERPL-MCNC: 1.39 MG/DL (ref 0.8–1.3)
CREAT SERPL-MCNC: 1.42 MG/DL (ref 0.8–1.3)
CREAT SERPL-MCNC: 1.51 MG/DL (ref 0.8–1.3)
CREAT SERPL-MCNC: 1.56 MG/DL (ref 0.8–1.3)
CREAT SERPL-MCNC: 1.62 MG/DL (ref 0.8–1.3)
DIFFERENTIAL METHOD BLD: ABNORMAL
EKG ATRIAL RATE: 45 BPM
EKG DIAGNOSIS: NORMAL
EKG P AXIS: 43 DEGREES
EKG P-R INTERVAL: 170 MS
EKG Q-T INTERVAL: 393 MS
EKG QRS DURATION: 84 MS
EKG QTC CALCULATION (BAZETT): 412 MS
EKG R AXIS: 44 DEGREES
EKG T AXIS: 206 DEGREES
EKG VENTRICULAR RATE: 101 BPM
EOSINOPHIL # BLD: 0 K/UL (ref 0–0.8)
EOSINOPHIL # BLD: 0.1 K/UL (ref 0–0.8)
EOSINOPHIL # BLD: 0.2 K/UL (ref 0–0.8)
EOSINOPHIL # BLD: 0.2 K/UL (ref 0–0.8)
EOSINOPHIL NFR BLD: 1 % (ref 0.5–7.8)
EOSINOPHIL NFR BLD: 2 % (ref 0.5–7.8)
EOSINOPHIL NFR BLD: 3 % (ref 0.5–7.8)
EOSINOPHIL NFR BLD: 4 % (ref 0.5–7.8)
ERYTHROCYTE [DISTWIDTH] IN BLOOD BY AUTOMATED COUNT: 14.2 % (ref 11.9–14.6)
ERYTHROCYTE [DISTWIDTH] IN BLOOD BY AUTOMATED COUNT: 14.5 % (ref 11.9–14.6)
ERYTHROCYTE [DISTWIDTH] IN BLOOD BY AUTOMATED COUNT: 14.6 % (ref 11.9–14.6)
ERYTHROCYTE [DISTWIDTH] IN BLOOD BY AUTOMATED COUNT: 14.7 % (ref 11.9–14.6)
ERYTHROCYTE [DISTWIDTH] IN BLOOD BY AUTOMATED COUNT: 14.7 % (ref 11.9–14.6)
ERYTHROCYTE [DISTWIDTH] IN BLOOD BY AUTOMATED COUNT: 14.8 % (ref 11.9–14.6)
ERYTHROCYTE [DISTWIDTH] IN BLOOD BY AUTOMATED COUNT: 14.9 % (ref 11.9–14.6)
EST. AVERAGE GLUCOSE BLD GHB EST-MCNC: 127 MG/DL
FLUAV SUBTYP SPEC NAA+PROBE: NOT DETECTED
FLUBV RNA SPEC QL NAA+PROBE: NOT DETECTED
GLOBULIN SER CALC-MCNC: 3.4 G/DL (ref 2.3–3.5)
GLUCOSE SERPL-MCNC: 107 MG/DL (ref 70–99)
GLUCOSE SERPL-MCNC: 113 MG/DL (ref 70–99)
GLUCOSE SERPL-MCNC: 115 MG/DL (ref 70–99)
GLUCOSE SERPL-MCNC: 119 MG/DL (ref 70–99)
GLUCOSE SERPL-MCNC: 120 MG/DL (ref 70–99)
GLUCOSE SERPL-MCNC: 121 MG/DL (ref 70–99)
GLUCOSE SERPL-MCNC: 122 MG/DL (ref 70–99)
GLUCOSE SERPL-MCNC: 126 MG/DL (ref 70–99)
GLUCOSE SERPL-MCNC: 129 MG/DL (ref 70–99)
GLUCOSE SERPL-MCNC: 143 MG/DL (ref 70–99)
GLUCOSE SERPL-MCNC: 145 MG/DL (ref 70–99)
GLUCOSE SERPL-MCNC: 147 MG/DL (ref 70–99)
GLUCOSE SERPL-MCNC: 179 MG/DL (ref 70–99)
GRAM STN SPEC: NORMAL
HADV DNA SPEC QL NAA+PROBE: NOT DETECTED
HBA1C MFR BLD: 6.1 % (ref 0–5.6)
HCOV 229E RNA SPEC QL NAA+PROBE: NOT DETECTED
HCOV HKU1 RNA SPEC QL NAA+PROBE: NOT DETECTED
HCOV NL63 RNA SPEC QL NAA+PROBE: NOT DETECTED
HCOV OC43 RNA SPEC QL NAA+PROBE: NOT DETECTED
HCT VFR BLD AUTO: 32.8 % (ref 41.1–50.3)
HCT VFR BLD AUTO: 34 % (ref 41.1–50.3)
HCT VFR BLD AUTO: 34 % (ref 41.1–50.3)
HCT VFR BLD AUTO: 34.1 % (ref 41.1–50.3)
HCT VFR BLD AUTO: 34.1 % (ref 41.1–50.3)
HCT VFR BLD AUTO: 34.4 % (ref 41.1–50.3)
HCT VFR BLD AUTO: 34.6 % (ref 41.1–50.3)
HCT VFR BLD AUTO: 36.5 % (ref 41.1–50.3)
HCT VFR BLD AUTO: 36.7 % (ref 41.1–50.3)
HCT VFR BLD AUTO: 36.9 % (ref 41.1–50.3)
HCT VFR BLD AUTO: 38.1 % (ref 41.1–50.3)
HGB BLD-MCNC: 10.5 G/DL (ref 13.6–17.2)
HGB BLD-MCNC: 10.8 G/DL (ref 13.6–17.2)
HGB BLD-MCNC: 10.8 G/DL (ref 13.6–17.2)
HGB BLD-MCNC: 10.9 G/DL (ref 13.6–17.2)
HGB BLD-MCNC: 11 G/DL (ref 13.6–17.2)
HGB BLD-MCNC: 11.5 G/DL (ref 13.6–17.2)
HGB BLD-MCNC: 11.6 G/DL (ref 13.6–17.2)
HGB BLD-MCNC: 11.6 G/DL (ref 13.6–17.2)
HGB BLD-MCNC: 11.9 G/DL (ref 13.6–17.2)
HMPV RNA SPEC QL NAA+PROBE: NOT DETECTED
HPIV1 RNA SPEC QL NAA+PROBE: NOT DETECTED
HPIV2 RNA SPEC QL NAA+PROBE: NOT DETECTED
HPIV3 RNA SPEC QL NAA+PROBE: NOT DETECTED
HPIV4 RNA SPEC QL NAA+PROBE: NOT DETECTED
IMM GRANULOCYTES # BLD AUTO: 0 K/UL (ref 0–0.5)
IMM GRANULOCYTES # BLD AUTO: 0.1 K/UL (ref 0–0.5)
IMM GRANULOCYTES # BLD AUTO: 0.1 K/UL (ref 0–0.5)
IMM GRANULOCYTES NFR BLD AUTO: 0 % (ref 0–5)
IMM GRANULOCYTES NFR BLD AUTO: 1 % (ref 0–5)
LYMPHOCYTES # BLD: 1 K/UL (ref 0.5–4.6)
LYMPHOCYTES # BLD: 1.1 K/UL (ref 0.5–4.6)
LYMPHOCYTES # BLD: 1.2 K/UL (ref 0.5–4.6)
LYMPHOCYTES # BLD: 1.3 K/UL (ref 0.5–4.6)
LYMPHOCYTES # BLD: 1.5 K/UL (ref 0.5–4.6)
LYMPHOCYTES # BLD: 1.7 K/UL (ref 0.5–4.6)
LYMPHOCYTES NFR BLD: 10 % (ref 13–44)
LYMPHOCYTES NFR BLD: 15 % (ref 13–44)
LYMPHOCYTES NFR BLD: 21 % (ref 13–44)
LYMPHOCYTES NFR BLD: 21 % (ref 13–44)
LYMPHOCYTES NFR BLD: 23 % (ref 13–44)
LYMPHOCYTES NFR BLD: 27 % (ref 13–44)
LYMPHOCYTES NFR BLD: 33 % (ref 13–44)
LYMPHOCYTES NFR BLD: 7 % (ref 13–44)
M PNEUMO DNA SPEC QL NAA+PROBE: NOT DETECTED
MAGNESIUM SERPL-MCNC: 2.3 MG/DL (ref 1.8–2.4)
MAGNESIUM SERPL-MCNC: 2.4 MG/DL (ref 1.8–2.4)
MCH RBC QN AUTO: 31.7 PG (ref 26.1–32.9)
MCH RBC QN AUTO: 31.7 PG (ref 26.1–32.9)
MCH RBC QN AUTO: 31.8 PG (ref 26.1–32.9)
MCH RBC QN AUTO: 31.9 PG (ref 26.1–32.9)
MCH RBC QN AUTO: 32.2 PG (ref 26.1–32.9)
MCH RBC QN AUTO: 32.2 PG (ref 26.1–32.9)
MCH RBC QN AUTO: 32.3 PG (ref 26.1–32.9)
MCHC RBC AUTO-ENTMCNC: 31.2 G/DL (ref 31.4–35)
MCHC RBC AUTO-ENTMCNC: 31.3 G/DL (ref 31.4–35)
MCHC RBC AUTO-ENTMCNC: 31.4 G/DL (ref 31.4–35)
MCHC RBC AUTO-ENTMCNC: 31.7 G/DL (ref 31.4–35)
MCHC RBC AUTO-ENTMCNC: 31.7 G/DL (ref 31.4–35)
MCHC RBC AUTO-ENTMCNC: 31.8 G/DL (ref 31.4–35)
MCHC RBC AUTO-ENTMCNC: 31.8 G/DL (ref 31.4–35)
MCHC RBC AUTO-ENTMCNC: 32 G/DL (ref 31.4–35)
MCHC RBC AUTO-ENTMCNC: 32 G/DL (ref 31.4–35)
MCHC RBC AUTO-ENTMCNC: 32.1 G/DL (ref 31.4–35)
MCHC RBC AUTO-ENTMCNC: 32.4 G/DL (ref 31.4–35)
MCV RBC AUTO: 100 FL (ref 82–102)
MCV RBC AUTO: 100 FL (ref 82–102)
MCV RBC AUTO: 100.3 FL (ref 82–102)
MCV RBC AUTO: 100.3 FL (ref 82–102)
MCV RBC AUTO: 100.8 FL (ref 82–102)
MCV RBC AUTO: 101.4 FL (ref 82–102)
MCV RBC AUTO: 101.9 FL (ref 82–102)
MCV RBC AUTO: 102.1 FL (ref 82–102)
MCV RBC AUTO: 99.4 FL (ref 82–102)
MCV RBC AUTO: 99.7 FL (ref 82–102)
MCV RBC AUTO: 99.7 FL (ref 82–102)
MONOCYTES # BLD: 0.5 K/UL (ref 0.1–1.3)
MONOCYTES # BLD: 0.5 K/UL (ref 0.1–1.3)
MONOCYTES # BLD: 0.6 K/UL (ref 0.1–1.3)
MONOCYTES # BLD: 0.7 K/UL (ref 0.1–1.3)
MONOCYTES # BLD: 0.7 K/UL (ref 0.1–1.3)
MONOCYTES # BLD: 0.9 K/UL (ref 0.1–1.3)
MONOCYTES NFR BLD: 10 % (ref 4–12)
MONOCYTES NFR BLD: 12 % (ref 4–12)
MONOCYTES NFR BLD: 4 % (ref 4–12)
MONOCYTES NFR BLD: 6 % (ref 4–12)
MONOCYTES NFR BLD: 8 % (ref 4–12)
MONOCYTES NFR BLD: 9 % (ref 4–12)
NEUTS SEG # BLD: 12.5 K/UL (ref 1.7–8.2)
NEUTS SEG # BLD: 2.8 K/UL (ref 1.7–8.2)
NEUTS SEG # BLD: 3.3 K/UL (ref 1.7–8.2)
NEUTS SEG # BLD: 4 K/UL (ref 1.7–8.2)
NEUTS SEG # BLD: 4.3 K/UL (ref 1.7–8.2)
NEUTS SEG # BLD: 4.8 K/UL (ref 1.7–8.2)
NEUTS SEG # BLD: 5.6 K/UL (ref 1.7–8.2)
NEUTS SEG # BLD: 9.7 K/UL (ref 1.7–8.2)
NEUTS SEG NFR BLD: 55 % (ref 43–78)
NEUTS SEG NFR BLD: 58 % (ref 43–78)
NEUTS SEG NFR BLD: 64 % (ref 43–78)
NEUTS SEG NFR BLD: 65 % (ref 43–78)
NEUTS SEG NFR BLD: 67 % (ref 43–78)
NEUTS SEG NFR BLD: 71 % (ref 43–78)
NEUTS SEG NFR BLD: 83 % (ref 43–78)
NEUTS SEG NFR BLD: 88 % (ref 43–78)
NRBC # BLD: 0 K/UL (ref 0–0.2)
NRBC # BLD: 0.02 K/UL (ref 0–0.2)
NT PRO BNP: ABNORMAL PG/ML (ref 0–450)
PLATELET # BLD AUTO: 178 K/UL (ref 150–450)
PLATELET # BLD AUTO: 183 K/UL (ref 150–450)
PLATELET # BLD AUTO: 184 K/UL (ref 150–450)
PLATELET # BLD AUTO: 185 K/UL (ref 150–450)
PLATELET # BLD AUTO: 189 K/UL (ref 150–450)
PLATELET # BLD AUTO: 199 K/UL (ref 150–450)
PLATELET # BLD AUTO: 207 K/UL (ref 150–450)
PLATELET # BLD AUTO: 210 K/UL (ref 150–450)
PLATELET # BLD AUTO: 228 K/UL (ref 150–450)
PLATELET # BLD AUTO: 247 K/UL (ref 150–450)
PLATELET # BLD AUTO: 248 K/UL (ref 150–450)
PMV BLD AUTO: 8.5 FL (ref 9.4–12.3)
PMV BLD AUTO: 8.6 FL (ref 9.4–12.3)
PMV BLD AUTO: 8.7 FL (ref 9.4–12.3)
PMV BLD AUTO: 8.8 FL (ref 9.4–12.3)
PMV BLD AUTO: 8.9 FL (ref 9.4–12.3)
PMV BLD AUTO: 8.9 FL (ref 9.4–12.3)
PMV BLD AUTO: 9 FL (ref 9.4–12.3)
PMV BLD AUTO: 9.1 FL (ref 9.4–12.3)
PMV BLD AUTO: 9.1 FL (ref 9.4–12.3)
POTASSIUM SERPL-SCNC: 3.9 MMOL/L (ref 3.5–5.1)
POTASSIUM SERPL-SCNC: 3.9 MMOL/L (ref 3.5–5.1)
POTASSIUM SERPL-SCNC: 4 MMOL/L (ref 3.5–5.1)
POTASSIUM SERPL-SCNC: 4.1 MMOL/L (ref 3.5–5.1)
POTASSIUM SERPL-SCNC: 4.2 MMOL/L (ref 3.5–5.1)
POTASSIUM SERPL-SCNC: 4.2 MMOL/L (ref 3.5–5.1)
POTASSIUM SERPL-SCNC: 4.3 MMOL/L (ref 3.5–5.1)
POTASSIUM SERPL-SCNC: 4.5 MMOL/L (ref 3.5–5.1)
POTASSIUM SERPL-SCNC: 5.1 MMOL/L (ref 3.5–5.1)
POTASSIUM SERPL-SCNC: ABNORMAL MMOL/L (ref 3.5–5.1)
POTASSIUM SERPL-SCNC: ABNORMAL MMOL/L (ref 3.5–5.1)
PROT SERPL-MCNC: 6.4 G/DL (ref 6.3–8.2)
RBC # BLD AUTO: 3.3 M/UL (ref 4.23–5.6)
RBC # BLD AUTO: 3.39 M/UL (ref 4.23–5.6)
RBC # BLD AUTO: 3.4 M/UL (ref 4.23–5.6)
RBC # BLD AUTO: 3.41 M/UL (ref 4.23–5.6)
RBC # BLD AUTO: 3.41 M/UL (ref 4.23–5.6)
RBC # BLD AUTO: 3.45 M/UL (ref 4.23–5.6)
RBC # BLD AUTO: 3.46 M/UL (ref 4.23–5.6)
RBC # BLD AUTO: 3.6 M/UL (ref 4.23–5.6)
RBC # BLD AUTO: 3.6 M/UL (ref 4.23–5.6)
RBC # BLD AUTO: 3.66 M/UL (ref 4.23–5.6)
RBC # BLD AUTO: 3.73 M/UL (ref 4.23–5.6)
RSV RNA SPEC QL NAA+PROBE: NOT DETECTED
RV+EV RNA SPEC QL NAA+PROBE: NOT DETECTED
SARS-COV-2 RNA RESP QL NAA+PROBE: NOT DETECTED
SERVICE CMNT-IMP: NORMAL
SODIUM SERPL-SCNC: 133 MMOL/L (ref 136–145)
SODIUM SERPL-SCNC: 135 MMOL/L (ref 136–145)
SODIUM SERPL-SCNC: 135 MMOL/L (ref 136–145)
SODIUM SERPL-SCNC: 136 MMOL/L (ref 136–145)
SODIUM SERPL-SCNC: 136 MMOL/L (ref 136–145)
SODIUM SERPL-SCNC: 137 MMOL/L (ref 136–145)
SODIUM SERPL-SCNC: 138 MMOL/L (ref 136–145)
SODIUM SERPL-SCNC: 139 MMOL/L (ref 136–145)
SODIUM SERPL-SCNC: 139 MMOL/L (ref 136–145)
SODIUM SERPL-SCNC: 140 MMOL/L (ref 136–145)
SODIUM SERPL-SCNC: 140 MMOL/L (ref 136–145)
SODIUM SERPL-SCNC: 141 MMOL/L (ref 136–145)
SODIUM SERPL-SCNC: 142 MMOL/L (ref 136–145)
TROPONIN T SERPL HS-MCNC: 152 NG/L (ref 0–22)
TROPONIN T SERPL HS-MCNC: 189 NG/L (ref 0–22)
WBC # BLD AUTO: 11.8 K/UL (ref 4.3–11.1)
WBC # BLD AUTO: 14.1 K/UL (ref 4.3–11.1)
WBC # BLD AUTO: 5.1 K/UL (ref 4.3–11.1)
WBC # BLD AUTO: 5.7 K/UL (ref 4.3–11.1)
WBC # BLD AUTO: 6.3 K/UL (ref 4.3–11.1)
WBC # BLD AUTO: 6.5 K/UL (ref 4.3–11.1)
WBC # BLD AUTO: 6.8 K/UL (ref 4.3–11.1)
WBC # BLD AUTO: 7.1 K/UL (ref 4.3–11.1)
WBC # BLD AUTO: 7.2 K/UL (ref 4.3–11.1)
WBC # BLD AUTO: 7.7 K/UL (ref 4.3–11.1)
WBC # BLD AUTO: 8.9 K/UL (ref 4.3–11.1)

## 2024-01-01 PROCEDURE — 2400000000

## 2024-01-01 PROCEDURE — 2500000003 HC RX 250 WO HCPCS: Performed by: CASE MANAGER/CARE COORDINATOR

## 2024-01-01 PROCEDURE — 6370000000 HC RX 637 (ALT 250 FOR IP): Performed by: INTERNAL MEDICINE

## 2024-01-01 PROCEDURE — 97535 SELF CARE MNGMENT TRAINING: CPT

## 2024-01-01 PROCEDURE — 94760 N-INVAS EAR/PLS OXIMETRY 1: CPT

## 2024-01-01 PROCEDURE — 83880 ASSAY OF NATRIURETIC PEPTIDE: CPT

## 2024-01-01 PROCEDURE — 99232 SBSQ HOSP IP/OBS MODERATE 35: CPT | Performed by: INTERNAL MEDICINE

## 2024-01-01 PROCEDURE — 94640 AIRWAY INHALATION TREATMENT: CPT

## 2024-01-01 PROCEDURE — 80048 BASIC METABOLIC PNL TOTAL CA: CPT

## 2024-01-01 PROCEDURE — 6370000000 HC RX 637 (ALT 250 FOR IP): Performed by: CASE MANAGER/CARE COORDINATOR

## 2024-01-01 PROCEDURE — 36415 COLL VENOUS BLD VENIPUNCTURE: CPT

## 2024-01-01 PROCEDURE — 97530 THERAPEUTIC ACTIVITIES: CPT

## 2024-01-01 PROCEDURE — 2700000000 HC OXYGEN THERAPY PER DAY

## 2024-01-01 PROCEDURE — 2580000003 HC RX 258: Performed by: CASE MANAGER/CARE COORDINATOR

## 2024-01-01 PROCEDURE — 87205 SMEAR GRAM STAIN: CPT

## 2024-01-01 PROCEDURE — 85025 COMPLETE CBC W/AUTO DIFF WBC: CPT

## 2024-01-01 PROCEDURE — 6370000000 HC RX 637 (ALT 250 FOR IP): Performed by: NURSE PRACTITIONER

## 2024-01-01 PROCEDURE — 85027 COMPLETE CBC AUTOMATED: CPT

## 2024-01-01 PROCEDURE — 99223 1ST HOSP IP/OBS HIGH 75: CPT | Performed by: NURSE PRACTITIONER

## 2024-01-01 PROCEDURE — 6360000002 HC RX W HCPCS: Performed by: NURSE PRACTITIONER

## 2024-01-01 PROCEDURE — 2140000000 HC CCU INTERMEDIATE R&B

## 2024-01-01 PROCEDURE — 76937 US GUIDE VASCULAR ACCESS: CPT

## 2024-01-01 PROCEDURE — 93005 ELECTROCARDIOGRAM TRACING: CPT | Performed by: EMERGENCY MEDICINE

## 2024-01-01 PROCEDURE — 2580000003 HC RX 258: Performed by: NURSE PRACTITIONER

## 2024-01-01 PROCEDURE — G0378 HOSPITAL OBSERVATION PER HR: HCPCS

## 2024-01-01 PROCEDURE — 99238 HOSP IP/OBS DSCHRG MGMT 30/<: CPT | Performed by: INTERNAL MEDICINE

## 2024-01-01 PROCEDURE — 96376 TX/PRO/DX INJ SAME DRUG ADON: CPT

## 2024-01-01 PROCEDURE — 6360000002 HC RX W HCPCS: Performed by: INTERNAL MEDICINE

## 2024-01-01 PROCEDURE — 94761 N-INVAS EAR/PLS OXIMETRY MLT: CPT

## 2024-01-01 PROCEDURE — 99223 1ST HOSP IP/OBS HIGH 75: CPT | Performed by: INTERNAL MEDICINE

## 2024-01-01 PROCEDURE — 71045 X-RAY EXAM CHEST 1 VIEW: CPT

## 2024-01-01 PROCEDURE — 84484 ASSAY OF TROPONIN QUANT: CPT

## 2024-01-01 PROCEDURE — 80076 HEPATIC FUNCTION PANEL: CPT

## 2024-01-01 PROCEDURE — 97162 PT EVAL MOD COMPLEX 30 MIN: CPT

## 2024-01-01 PROCEDURE — 93010 ELECTROCARDIOGRAM REPORT: CPT | Performed by: INTERNAL MEDICINE

## 2024-01-01 PROCEDURE — 94762 N-INVAS EAR/PLS OXIMTRY CONT: CPT

## 2024-01-01 PROCEDURE — 96374 THER/PROPH/DIAG INJ IV PUSH: CPT

## 2024-01-01 PROCEDURE — 6360000002 HC RX W HCPCS: Performed by: EMERGENCY MEDICINE

## 2024-01-01 PROCEDURE — 87070 CULTURE OTHR SPECIMN AEROBIC: CPT

## 2024-01-01 PROCEDURE — 99285 EMERGENCY DEPT VISIT HI MDM: CPT

## 2024-01-01 PROCEDURE — 6360000002 HC RX W HCPCS: Performed by: CASE MANAGER/CARE COORDINATOR

## 2024-01-01 PROCEDURE — 83735 ASSAY OF MAGNESIUM: CPT

## 2024-01-01 PROCEDURE — 0202U NFCT DS 22 TRGT SARS-COV-2: CPT

## 2024-01-01 PROCEDURE — 97110 THERAPEUTIC EXERCISES: CPT

## 2024-01-01 PROCEDURE — 97166 OT EVAL MOD COMPLEX 45 MIN: CPT

## 2024-01-01 PROCEDURE — 99233 SBSQ HOSP IP/OBS HIGH 50: CPT | Performed by: INTERNAL MEDICINE

## 2024-01-01 PROCEDURE — 83036 HEMOGLOBIN GLYCOSYLATED A1C: CPT

## 2024-01-01 PROCEDURE — 87040 BLOOD CULTURE FOR BACTERIA: CPT

## 2024-01-01 PROCEDURE — 6370000000 HC RX 637 (ALT 250 FOR IP): Performed by: EMERGENCY MEDICINE

## 2024-01-01 RX ORDER — FUROSEMIDE 10 MG/ML
60 INJECTION INTRAMUSCULAR; INTRAVENOUS 2 TIMES DAILY
Status: DISCONTINUED | OUTPATIENT
Start: 2024-01-01 | End: 2024-01-01

## 2024-01-01 RX ORDER — BENZONATATE 100 MG/1
100 CAPSULE ORAL 3 TIMES DAILY PRN
Status: COMPLETED | OUTPATIENT
Start: 2024-01-01 | End: 2024-01-01

## 2024-01-01 RX ORDER — METOPROLOL TARTRATE 50 MG
50 TABLET ORAL 2 TIMES DAILY
Status: DISCONTINUED | OUTPATIENT
Start: 2024-01-01 | End: 2024-01-01

## 2024-01-01 RX ORDER — POTASSIUM CHLORIDE 7.45 MG/ML
10 INJECTION INTRAVENOUS PRN
Status: DISCONTINUED | OUTPATIENT
Start: 2024-01-01 | End: 2024-01-01 | Stop reason: HOSPADM

## 2024-01-01 RX ORDER — BUMETANIDE 1 MG/1
1 TABLET ORAL 2 TIMES DAILY
Status: DISCONTINUED | OUTPATIENT
Start: 2024-01-01 | End: 2024-01-01 | Stop reason: HOSPADM

## 2024-01-01 RX ORDER — POLYETHYLENE GLYCOL 3350 17 G/17G
17 POWDER, FOR SOLUTION ORAL DAILY PRN
Status: DISCONTINUED | OUTPATIENT
Start: 2024-01-01 | End: 2024-01-01 | Stop reason: HOSPADM

## 2024-01-01 RX ORDER — OXYCODONE HCL 10 MG/1
10 TABLET, FILM COATED, EXTENDED RELEASE ORAL ONCE
Status: COMPLETED | OUTPATIENT
Start: 2024-01-01 | End: 2024-01-01

## 2024-01-01 RX ORDER — SPIRONOLACTONE 25 MG/1
12.5 TABLET ORAL DAILY
Qty: 30 TABLET | Refills: 3 | Status: SHIPPED | OUTPATIENT
Start: 2024-01-01 | End: 2024-12-27

## 2024-01-01 RX ORDER — FUROSEMIDE 10 MG/ML
20 INJECTION INTRAMUSCULAR; INTRAVENOUS ONCE
Status: COMPLETED | OUTPATIENT
Start: 2024-01-01 | End: 2024-01-01

## 2024-01-01 RX ORDER — HYDROCODONE BITARTRATE AND HOMATROPINE METHYLBROMIDE ORAL SOLUTION 5; 1.5 MG/5ML; MG/5ML
5 LIQUID ORAL EVERY 4 HOURS PRN
Status: DISCONTINUED | OUTPATIENT
Start: 2024-01-01 | End: 2024-01-01 | Stop reason: HOSPADM

## 2024-01-01 RX ORDER — LEVALBUTEROL INHALATION SOLUTION 0.63 MG/3ML
0.63 SOLUTION RESPIRATORY (INHALATION) EVERY 8 HOURS PRN
Status: DISCONTINUED | OUTPATIENT
Start: 2024-01-01 | End: 2024-01-01 | Stop reason: HOSPADM

## 2024-01-01 RX ORDER — BUMETANIDE 1 MG/1
1 TABLET ORAL 2 TIMES DAILY
Qty: 30 TABLET | Refills: 3 | Status: SHIPPED | OUTPATIENT
Start: 2024-01-01 | End: 2024-12-27

## 2024-01-01 RX ORDER — ONDANSETRON 2 MG/ML
4 INJECTION INTRAMUSCULAR; INTRAVENOUS EVERY 6 HOURS PRN
Status: DISCONTINUED | OUTPATIENT
Start: 2024-01-01 | End: 2024-01-01 | Stop reason: HOSPADM

## 2024-01-01 RX ORDER — MAGNESIUM SULFATE IN WATER 40 MG/ML
2000 INJECTION, SOLUTION INTRAVENOUS PRN
Status: DISCONTINUED | OUTPATIENT
Start: 2024-01-01 | End: 2024-01-01 | Stop reason: HOSPADM

## 2024-01-01 RX ORDER — METOPROLOL SUCCINATE 25 MG/1
25 TABLET, EXTENDED RELEASE ORAL 2 TIMES DAILY
Status: DISCONTINUED | OUTPATIENT
Start: 2024-01-01 | End: 2024-01-01

## 2024-01-01 RX ORDER — FUROSEMIDE 10 MG/ML
40 INJECTION INTRAMUSCULAR; INTRAVENOUS 2 TIMES DAILY
Status: DISCONTINUED | OUTPATIENT
Start: 2024-01-01 | End: 2024-01-01

## 2024-01-01 RX ORDER — POTASSIUM CHLORIDE 1500 MG/1
40 TABLET, EXTENDED RELEASE ORAL PRN
Status: DISCONTINUED | OUTPATIENT
Start: 2024-01-01 | End: 2024-01-01 | Stop reason: HOSPADM

## 2024-01-01 RX ORDER — ONDANSETRON 4 MG/1
4 TABLET, ORALLY DISINTEGRATING ORAL EVERY 8 HOURS PRN
Status: DISCONTINUED | OUTPATIENT
Start: 2024-01-01 | End: 2024-01-01 | Stop reason: HOSPADM

## 2024-01-01 RX ORDER — SODIUM CHLORIDE 0.9 % (FLUSH) 0.9 %
5-40 SYRINGE (ML) INJECTION EVERY 12 HOURS SCHEDULED
Status: DISCONTINUED | OUTPATIENT
Start: 2024-01-01 | End: 2024-01-01 | Stop reason: HOSPADM

## 2024-01-01 RX ORDER — GUAIFENESIN 200 MG/10ML
200 LIQUID ORAL EVERY 4 HOURS PRN
Status: DISCONTINUED | OUTPATIENT
Start: 2024-01-01 | End: 2024-01-01

## 2024-01-01 RX ORDER — GUAIFENESIN 600 MG/1
1200 TABLET, EXTENDED RELEASE ORAL 2 TIMES DAILY
Status: DISCONTINUED | OUTPATIENT
Start: 2024-01-01 | End: 2024-01-01 | Stop reason: HOSPADM

## 2024-01-01 RX ORDER — LIDOCAINE 4 G/G
2 PATCH TOPICAL
Status: DISCONTINUED | OUTPATIENT
Start: 2024-01-01 | End: 2024-01-01 | Stop reason: HOSPADM

## 2024-01-01 RX ORDER — METOPROLOL SUCCINATE 50 MG/1
50 TABLET, EXTENDED RELEASE ORAL DAILY
Qty: 30 TABLET | Refills: 3 | Status: SHIPPED | OUTPATIENT
Start: 2024-01-01 | End: 2024-12-27

## 2024-01-01 RX ORDER — ACETAMINOPHEN 325 MG/1
650 TABLET ORAL EVERY 6 HOURS PRN
Status: DISCONTINUED | OUTPATIENT
Start: 2024-01-01 | End: 2024-01-01 | Stop reason: HOSPADM

## 2024-01-01 RX ORDER — ASPIRIN 81 MG/1
81 TABLET, CHEWABLE ORAL DAILY
Status: DISCONTINUED | OUTPATIENT
Start: 2024-01-01 | End: 2024-01-01 | Stop reason: HOSPADM

## 2024-01-01 RX ORDER — MINERAL OIL/HYDROPHIL PETROLAT
OINTMENT (GRAM) TOPICAL 2 TIMES DAILY PRN
Status: DISCONTINUED | OUTPATIENT
Start: 2024-01-01 | End: 2024-01-01 | Stop reason: HOSPADM

## 2024-01-01 RX ORDER — RANOLAZINE 500 MG/1
500 TABLET, EXTENDED RELEASE ORAL 2 TIMES DAILY
Status: DISCONTINUED | OUTPATIENT
Start: 2024-01-01 | End: 2024-01-01 | Stop reason: HOSPADM

## 2024-01-01 RX ORDER — SODIUM CHLORIDE 9 MG/ML
INJECTION, SOLUTION INTRAVENOUS PRN
Status: DISCONTINUED | OUTPATIENT
Start: 2024-01-01 | End: 2024-01-01 | Stop reason: HOSPADM

## 2024-01-01 RX ORDER — METOPROLOL SUCCINATE 25 MG/1
50 TABLET, EXTENDED RELEASE ORAL 2 TIMES DAILY
Status: DISCONTINUED | OUTPATIENT
Start: 2024-01-01 | End: 2024-01-01

## 2024-01-01 RX ORDER — SPIRONOLACTONE 25 MG/1
12.5 TABLET ORAL DAILY
Status: DISCONTINUED | OUTPATIENT
Start: 2024-01-01 | End: 2024-01-01 | Stop reason: HOSPADM

## 2024-01-01 RX ORDER — METOPROLOL SUCCINATE 25 MG/1
50 TABLET, EXTENDED RELEASE ORAL DAILY
Status: DISCONTINUED | OUTPATIENT
Start: 2024-01-01 | End: 2024-01-01 | Stop reason: HOSPADM

## 2024-01-01 RX ORDER — SODIUM CHLORIDE 0.9 % (FLUSH) 0.9 %
5-40 SYRINGE (ML) INJECTION PRN
Status: DISCONTINUED | OUTPATIENT
Start: 2024-01-01 | End: 2024-01-01 | Stop reason: HOSPADM

## 2024-01-01 RX ORDER — ACETAMINOPHEN 650 MG/1
650 SUPPOSITORY RECTAL EVERY 6 HOURS PRN
Status: DISCONTINUED | OUTPATIENT
Start: 2024-01-01 | End: 2024-01-01 | Stop reason: HOSPADM

## 2024-01-01 RX ORDER — FLUTICASONE PROPIONATE 50 MCG
2 SPRAY, SUSPENSION (ML) NASAL DAILY PRN
Status: DISCONTINUED | OUTPATIENT
Start: 2024-01-01 | End: 2024-01-01 | Stop reason: HOSPADM

## 2024-01-01 RX ORDER — OXYCODONE AND ACETAMINOPHEN 5; 325 MG/1; MG/1
1 TABLET ORAL EVERY 4 HOURS PRN
Status: DISCONTINUED | OUTPATIENT
Start: 2024-01-01 | End: 2024-01-01 | Stop reason: HOSPADM

## 2024-01-01 RX ORDER — NITROGLYCERIN 0.4 MG/1
0.4 TABLET SUBLINGUAL EVERY 5 MIN PRN
Status: DISCONTINUED | OUTPATIENT
Start: 2024-01-01 | End: 2024-01-01 | Stop reason: HOSPADM

## 2024-01-01 RX ORDER — BUMETANIDE 1 MG/1
2 TABLET ORAL 2 TIMES DAILY
Status: DISCONTINUED | OUTPATIENT
Start: 2024-01-01 | End: 2024-01-01

## 2024-01-01 RX ORDER — SODIUM CHLORIDE FOR INHALATION 3 %
4 VIAL, NEBULIZER (ML) INHALATION PRN
Status: DISCONTINUED | OUTPATIENT
Start: 2024-01-01 | End: 2024-01-01 | Stop reason: HOSPADM

## 2024-01-01 RX ORDER — FUROSEMIDE 10 MG/ML
40 INJECTION INTRAMUSCULAR; INTRAVENOUS ONCE
Status: COMPLETED | OUTPATIENT
Start: 2024-01-01 | End: 2024-01-01

## 2024-01-01 RX ORDER — BENZONATATE 100 MG/1
100 CAPSULE ORAL 3 TIMES DAILY PRN
Status: DISCONTINUED | OUTPATIENT
Start: 2024-01-01 | End: 2024-01-01 | Stop reason: HOSPADM

## 2024-01-01 RX ADMIN — EMPAGLIFLOZIN 10 MG: 10 TABLET, FILM COATED ORAL at 07:54

## 2024-01-01 RX ADMIN — FLUTICASONE PROPIONATE 2 SPRAY: 50 SPRAY, METERED NASAL at 23:12

## 2024-01-01 RX ADMIN — RANOLAZINE 500 MG: 500 TABLET, FILM COATED, EXTENDED RELEASE ORAL at 08:32

## 2024-01-01 RX ADMIN — HYDROCODONE BITARTRATE AND HOMATROPINE METHYLBROMIDE 5 ML: 5; 1.5 SOLUTION ORAL at 12:42

## 2024-01-01 RX ADMIN — METOPROLOL SUCCINATE 25 MG: 25 TABLET, EXTENDED RELEASE ORAL at 20:07

## 2024-01-01 RX ADMIN — LEVALBUTEROL HYDROCHLORIDE 0.63 MG: 0.63 SOLUTION RESPIRATORY (INHALATION) at 04:00

## 2024-01-01 RX ADMIN — SPIRONOLACTONE 12.5 MG: 25 TABLET ORAL at 08:49

## 2024-01-01 RX ADMIN — HYDROCODONE BITARTRATE AND HOMATROPINE METHYLBROMIDE 5 ML: 5; 1.5 SOLUTION ORAL at 03:26

## 2024-01-01 RX ADMIN — ASPIRIN 81 MG: 81 TABLET, CHEWABLE ORAL at 09:08

## 2024-01-01 RX ADMIN — ASPIRIN 81 MG: 81 TABLET, CHEWABLE ORAL at 08:50

## 2024-01-01 RX ADMIN — FUROSEMIDE 60 MG: 10 INJECTION, SOLUTION INTRAMUSCULAR; INTRAVENOUS at 18:07

## 2024-01-01 RX ADMIN — SODIUM CHLORIDE, PRESERVATIVE FREE 10 ML: 5 INJECTION INTRAVENOUS at 09:50

## 2024-01-01 RX ADMIN — GUAIFENESIN 1200 MG: 600 TABLET ORAL at 09:01

## 2024-01-01 RX ADMIN — METOPROLOL SUCCINATE 25 MG: 25 TABLET, EXTENDED RELEASE ORAL at 20:45

## 2024-01-01 RX ADMIN — NITROGLYCERIN 1 INCH: 20 OINTMENT TOPICAL at 12:50

## 2024-01-01 RX ADMIN — SPIRONOLACTONE 12.5 MG: 25 TABLET ORAL at 08:56

## 2024-01-01 RX ADMIN — FLUTICASONE PROPIONATE 2 SPRAY: 50 SPRAY, METERED NASAL at 21:03

## 2024-01-01 RX ADMIN — SODIUM CHLORIDE, PRESERVATIVE FREE 10 ML: 5 INJECTION INTRAVENOUS at 20:27

## 2024-01-01 RX ADMIN — HYDROCODONE BITARTRATE AND HOMATROPINE METHYLBROMIDE 5 ML: 5; 1.5 SOLUTION ORAL at 02:08

## 2024-01-01 RX ADMIN — SODIUM CHLORIDE, PRESERVATIVE FREE 10 ML: 5 INJECTION INTRAVENOUS at 08:24

## 2024-01-01 RX ADMIN — GUAIFENESIN 1200 MG: 600 TABLET ORAL at 08:02

## 2024-01-01 RX ADMIN — METOPROLOL SUCCINATE 25 MG: 25 TABLET, EXTENDED RELEASE ORAL at 20:26

## 2024-01-01 RX ADMIN — BENZONATATE 100 MG: 100 CAPSULE ORAL at 05:03

## 2024-01-01 RX ADMIN — HYDROCODONE BITARTRATE AND HOMATROPINE METHYLBROMIDE 5 ML: 5; 1.5 SOLUTION ORAL at 08:56

## 2024-01-01 RX ADMIN — GUAIFENESIN 200 MG: 200 SOLUTION ORAL at 20:03

## 2024-01-01 RX ADMIN — APIXABAN 5 MG: 5 TABLET, FILM COATED ORAL at 09:15

## 2024-01-01 RX ADMIN — APIXABAN 5 MG: 5 TABLET, FILM COATED ORAL at 08:30

## 2024-01-01 RX ADMIN — RANOLAZINE 500 MG: 500 TABLET, FILM COATED, EXTENDED RELEASE ORAL at 20:07

## 2024-01-01 RX ADMIN — METOPROLOL SUCCINATE 25 MG: 25 TABLET, EXTENDED RELEASE ORAL at 08:49

## 2024-01-01 RX ADMIN — SODIUM CHLORIDE, PRESERVATIVE FREE 10 ML: 5 INJECTION INTRAVENOUS at 20:08

## 2024-01-01 RX ADMIN — HYDROCODONE BITARTRATE AND HOMATROPINE METHYLBROMIDE 5 ML: 5; 1.5 SOLUTION ORAL at 17:22

## 2024-01-01 RX ADMIN — METOPROLOL SUCCINATE 25 MG: 25 TABLET, EXTENDED RELEASE ORAL at 21:40

## 2024-01-01 RX ADMIN — HYDROCODONE BITARTRATE AND HOMATROPINE METHYLBROMIDE 5 ML: 5; 1.5 SOLUTION ORAL at 15:51

## 2024-01-01 RX ADMIN — RANOLAZINE 500 MG: 500 TABLET, FILM COATED, EXTENDED RELEASE ORAL at 09:15

## 2024-01-01 RX ADMIN — METOPROLOL SUCCINATE 25 MG: 25 TABLET, EXTENDED RELEASE ORAL at 21:03

## 2024-01-01 RX ADMIN — GUAIFENESIN 200 MG: 200 SOLUTION ORAL at 08:33

## 2024-01-01 RX ADMIN — METOPROLOL SUCCINATE 25 MG: 25 TABLET, EXTENDED RELEASE ORAL at 08:56

## 2024-01-01 RX ADMIN — EMPAGLIFLOZIN 10 MG: 10 TABLET, FILM COATED ORAL at 08:33

## 2024-01-01 RX ADMIN — GUAIFENESIN 200 MG: 200 SOLUTION ORAL at 20:31

## 2024-01-01 RX ADMIN — METOPROLOL TARTRATE 50 MG: 50 TABLET, FILM COATED ORAL at 22:01

## 2024-01-01 RX ADMIN — APIXABAN 5 MG: 5 TABLET, FILM COATED ORAL at 20:07

## 2024-01-01 RX ADMIN — EMPAGLIFLOZIN 10 MG: 10 TABLET, FILM COATED ORAL at 08:40

## 2024-01-01 RX ADMIN — METOPROLOL SUCCINATE 25 MG: 25 TABLET, EXTENDED RELEASE ORAL at 09:15

## 2024-01-01 RX ADMIN — OXYCODONE HYDROCHLORIDE AND ACETAMINOPHEN 1 TABLET: 5; 325 TABLET ORAL at 21:30

## 2024-01-01 RX ADMIN — OXYCODONE HYDROCHLORIDE AND ACETAMINOPHEN 1 TABLET: 5; 325 TABLET ORAL at 00:52

## 2024-01-01 RX ADMIN — METOPROLOL TARTRATE 50 MG: 50 TABLET, FILM COATED ORAL at 08:32

## 2024-01-01 RX ADMIN — EMPAGLIFLOZIN 10 MG: 10 TABLET, FILM COATED ORAL at 09:08

## 2024-01-01 RX ADMIN — FLUTICASONE PROPIONATE 2 SPRAY: 50 SPRAY, METERED NASAL at 20:28

## 2024-01-01 RX ADMIN — BENZONATATE 100 MG: 100 CAPSULE ORAL at 14:15

## 2024-01-01 RX ADMIN — APIXABAN 5 MG: 5 TABLET, FILM COATED ORAL at 22:01

## 2024-01-01 RX ADMIN — FUROSEMIDE 40 MG: 10 INJECTION, SOLUTION INTRAMUSCULAR; INTRAVENOUS at 17:02

## 2024-01-01 RX ADMIN — LEVALBUTEROL HYDROCHLORIDE 0.63 MG: 0.63 SOLUTION RESPIRATORY (INHALATION) at 05:11

## 2024-01-01 RX ADMIN — SODIUM CHLORIDE, PRESERVATIVE FREE 5 ML: 5 INJECTION INTRAVENOUS at 20:56

## 2024-01-01 RX ADMIN — BUMETANIDE 1 MG: 1 TABLET ORAL at 17:14

## 2024-01-01 RX ADMIN — SODIUM CHLORIDE, PRESERVATIVE FREE 10 ML: 5 INJECTION INTRAVENOUS at 20:30

## 2024-01-01 RX ADMIN — POLYETHYLENE GLYCOL 3350 17 G: 17 POWDER, FOR SOLUTION ORAL at 09:05

## 2024-01-01 RX ADMIN — ASPIRIN 81 MG: 81 TABLET, CHEWABLE ORAL at 08:40

## 2024-01-01 RX ADMIN — ASPIRIN 81 MG: 81 TABLET, CHEWABLE ORAL at 08:33

## 2024-01-01 RX ADMIN — RANOLAZINE 500 MG: 500 TABLET, FILM COATED, EXTENDED RELEASE ORAL at 08:40

## 2024-01-01 RX ADMIN — METOPROLOL TARTRATE 50 MG: 50 TABLET, FILM COATED ORAL at 20:56

## 2024-01-01 RX ADMIN — APIXABAN 5 MG: 5 TABLET, FILM COATED ORAL at 07:54

## 2024-01-01 RX ADMIN — GUAIFENESIN 200 MG: 200 SOLUTION ORAL at 23:12

## 2024-01-01 RX ADMIN — GUAIFENESIN 1200 MG: 600 TABLET ORAL at 21:03

## 2024-01-01 RX ADMIN — HYDROCODONE BITARTRATE AND HOMATROPINE METHYLBROMIDE 5 ML: 5; 1.5 SOLUTION ORAL at 23:25

## 2024-01-01 RX ADMIN — BUMETANIDE 2 MG: 1 TABLET ORAL at 17:22

## 2024-01-01 RX ADMIN — METOPROLOL SUCCINATE 25 MG: 25 TABLET, EXTENDED RELEASE ORAL at 20:21

## 2024-01-01 RX ADMIN — SODIUM CHLORIDE, PRESERVATIVE FREE 10 ML: 5 INJECTION INTRAVENOUS at 09:09

## 2024-01-01 RX ADMIN — SPIRONOLACTONE 12.5 MG: 25 TABLET ORAL at 07:54

## 2024-01-01 RX ADMIN — BENZONATATE 100 MG: 100 CAPSULE ORAL at 15:45

## 2024-01-01 RX ADMIN — RANOLAZINE 500 MG: 500 TABLET, FILM COATED, EXTENDED RELEASE ORAL at 20:34

## 2024-01-01 RX ADMIN — RANOLAZINE 500 MG: 500 TABLET, FILM COATED, EXTENDED RELEASE ORAL at 08:23

## 2024-01-01 RX ADMIN — SPIRONOLACTONE 12.5 MG: 25 TABLET ORAL at 08:02

## 2024-01-01 RX ADMIN — BENZONATATE 100 MG: 100 CAPSULE ORAL at 15:51

## 2024-01-01 RX ADMIN — RANOLAZINE 500 MG: 500 TABLET, FILM COATED, EXTENDED RELEASE ORAL at 20:45

## 2024-01-01 RX ADMIN — RANOLAZINE 500 MG: 500 TABLET, FILM COATED, EXTENDED RELEASE ORAL at 20:21

## 2024-01-01 RX ADMIN — GUAIFENESIN 1200 MG: 600 TABLET ORAL at 20:48

## 2024-01-01 RX ADMIN — METOPROLOL SUCCINATE 50 MG: 25 TABLET, EXTENDED RELEASE ORAL at 09:08

## 2024-01-01 RX ADMIN — METOPROLOL SUCCINATE 25 MG: 25 TABLET, EXTENDED RELEASE ORAL at 20:48

## 2024-01-01 RX ADMIN — APIXABAN 5 MG: 5 TABLET, FILM COATED ORAL at 20:34

## 2024-01-01 RX ADMIN — APIXABAN 5 MG: 5 TABLET, FILM COATED ORAL at 08:56

## 2024-01-01 RX ADMIN — ACETAMINOPHEN 650 MG: 325 TABLET ORAL at 20:21

## 2024-01-01 RX ADMIN — APIXABAN 5 MG: 5 TABLET, FILM COATED ORAL at 21:08

## 2024-01-01 RX ADMIN — GUAIFENESIN 200 MG: 200 SOLUTION ORAL at 06:58

## 2024-01-01 RX ADMIN — APIXABAN 5 MG: 5 TABLET, FILM COATED ORAL at 08:40

## 2024-01-01 RX ADMIN — APIXABAN 5 MG: 5 TABLET, FILM COATED ORAL at 21:40

## 2024-01-01 RX ADMIN — APIXABAN 5 MG: 5 TABLET, FILM COATED ORAL at 08:26

## 2024-01-01 RX ADMIN — DICLOFENAC SODIUM 2 G: 10 GEL TOPICAL at 00:31

## 2024-01-01 RX ADMIN — BUMETANIDE 1 MG: 1 TABLET ORAL at 09:08

## 2024-01-01 RX ADMIN — ACETAMINOPHEN 650 MG: 325 TABLET ORAL at 20:53

## 2024-01-01 RX ADMIN — FUROSEMIDE 60 MG: 10 INJECTION, SOLUTION INTRAMUSCULAR; INTRAVENOUS at 08:26

## 2024-01-01 RX ADMIN — RANOLAZINE 500 MG: 500 TABLET, FILM COATED, EXTENDED RELEASE ORAL at 09:01

## 2024-01-01 RX ADMIN — LEVALBUTEROL HYDROCHLORIDE 0.63 MG: 0.63 SOLUTION RESPIRATORY (INHALATION) at 22:45

## 2024-01-01 RX ADMIN — FUROSEMIDE 40 MG: 10 INJECTION, SOLUTION INTRAMUSCULAR; INTRAVENOUS at 14:22

## 2024-01-01 RX ADMIN — HYDROCODONE BITARTRATE AND HOMATROPINE METHYLBROMIDE 5 ML: 5; 1.5 SOLUTION ORAL at 14:15

## 2024-01-01 RX ADMIN — HYDROCODONE BITARTRATE AND HOMATROPINE METHYLBROMIDE 5 ML: 5; 1.5 SOLUTION ORAL at 22:33

## 2024-01-01 RX ADMIN — FLUTICASONE PROPIONATE 2 SPRAY: 50 SPRAY, METERED NASAL at 13:30

## 2024-01-01 RX ADMIN — ASPIRIN 81 MG: 81 TABLET, CHEWABLE ORAL at 17:00

## 2024-01-01 RX ADMIN — RANOLAZINE 500 MG: 500 TABLET, FILM COATED, EXTENDED RELEASE ORAL at 20:29

## 2024-01-01 RX ADMIN — SPIRONOLACTONE 12.5 MG: 25 TABLET ORAL at 09:08

## 2024-01-01 RX ADMIN — SODIUM CHLORIDE, PRESERVATIVE FREE 10 ML: 5 INJECTION INTRAVENOUS at 22:33

## 2024-01-01 RX ADMIN — SODIUM CHLORIDE, PRESERVATIVE FREE 10 ML: 5 INJECTION INTRAVENOUS at 08:57

## 2024-01-01 RX ADMIN — LEVALBUTEROL HYDROCHLORIDE 0.63 MG: 0.63 SOLUTION RESPIRATORY (INHALATION) at 04:35

## 2024-01-01 RX ADMIN — APIXABAN 5 MG: 5 TABLET, FILM COATED ORAL at 09:09

## 2024-01-01 RX ADMIN — APIXABAN 5 MG: 5 TABLET, FILM COATED ORAL at 20:56

## 2024-01-01 RX ADMIN — APIXABAN 5 MG: 5 TABLET, FILM COATED ORAL at 08:23

## 2024-01-01 RX ADMIN — RANOLAZINE 500 MG: 500 TABLET, FILM COATED, EXTENDED RELEASE ORAL at 09:49

## 2024-01-01 RX ADMIN — GUAIFENESIN 1200 MG: 600 TABLET ORAL at 20:44

## 2024-01-01 RX ADMIN — BUMETANIDE 2 MG: 1 TABLET ORAL at 16:53

## 2024-01-01 RX ADMIN — SODIUM CHLORIDE, PRESERVATIVE FREE 10 ML: 5 INJECTION INTRAVENOUS at 08:40

## 2024-01-01 RX ADMIN — BUMETANIDE 1 MG: 1 TABLET ORAL at 17:41

## 2024-01-01 RX ADMIN — EMPAGLIFLOZIN 10 MG: 10 TABLET, FILM COATED ORAL at 08:26

## 2024-01-01 RX ADMIN — GUAIFENESIN 200 MG: 200 SOLUTION ORAL at 11:33

## 2024-01-01 RX ADMIN — ASPIRIN 81 MG: 81 TABLET, CHEWABLE ORAL at 09:01

## 2024-01-01 RX ADMIN — SODIUM CHLORIDE, PRESERVATIVE FREE 10 ML: 5 INJECTION INTRAVENOUS at 08:26

## 2024-01-01 RX ADMIN — GUAIFENESIN 200 MG: 200 SOLUTION ORAL at 16:12

## 2024-01-01 RX ADMIN — EMPAGLIFLOZIN 10 MG: 10 TABLET, FILM COATED ORAL at 08:56

## 2024-01-01 RX ADMIN — ASPIRIN 81 MG: 81 TABLET, CHEWABLE ORAL at 07:54

## 2024-01-01 RX ADMIN — PHENOL 1 SPRAY: 1.5 LIQUID ORAL at 19:56

## 2024-01-01 RX ADMIN — SODIUM CHLORIDE, PRESERVATIVE FREE 10 ML: 5 INJECTION INTRAVENOUS at 20:26

## 2024-01-01 RX ADMIN — APIXABAN 5 MG: 5 TABLET, FILM COATED ORAL at 21:15

## 2024-01-01 RX ADMIN — SODIUM CHLORIDE, PRESERVATIVE FREE 10 ML: 5 INJECTION INTRAVENOUS at 20:45

## 2024-01-01 RX ADMIN — BENZONATATE 100 MG: 100 CAPSULE ORAL at 20:03

## 2024-01-01 RX ADMIN — GUAIFENESIN 1200 MG: 600 TABLET ORAL at 20:26

## 2024-01-01 RX ADMIN — ASPIRIN 81 MG: 81 TABLET, CHEWABLE ORAL at 08:30

## 2024-01-01 RX ADMIN — SODIUM CHLORIDE, PRESERVATIVE FREE 10 ML: 5 INJECTION INTRAVENOUS at 08:54

## 2024-01-01 RX ADMIN — ASPIRIN 81 MG: 81 TABLET, CHEWABLE ORAL at 08:56

## 2024-01-01 RX ADMIN — BUMETANIDE 2 MG: 1 TABLET ORAL at 09:15

## 2024-01-01 RX ADMIN — RANOLAZINE 500 MG: 500 TABLET, FILM COATED, EXTENDED RELEASE ORAL at 21:03

## 2024-01-01 RX ADMIN — METOPROLOL SUCCINATE 25 MG: 25 TABLET, EXTENDED RELEASE ORAL at 08:23

## 2024-01-01 RX ADMIN — FLUTICASONE PROPIONATE 2 SPRAY: 50 SPRAY, METERED NASAL at 00:26

## 2024-01-01 RX ADMIN — HYDROCODONE BITARTRATE AND HOMATROPINE METHYLBROMIDE 5 ML: 5; 1.5 SOLUTION ORAL at 21:03

## 2024-01-01 RX ADMIN — BENZONATATE 100 MG: 100 CAPSULE ORAL at 22:35

## 2024-01-01 RX ADMIN — FUROSEMIDE 60 MG: 10 INJECTION, SOLUTION INTRAMUSCULAR; INTRAVENOUS at 17:35

## 2024-01-01 RX ADMIN — APIXABAN 5 MG: 5 TABLET, FILM COATED ORAL at 08:02

## 2024-01-01 RX ADMIN — RANOLAZINE 500 MG: 500 TABLET, FILM COATED, EXTENDED RELEASE ORAL at 08:30

## 2024-01-01 RX ADMIN — RANOLAZINE 500 MG: 500 TABLET, FILM COATED, EXTENDED RELEASE ORAL at 08:02

## 2024-01-01 RX ADMIN — METOPROLOL SUCCINATE 25 MG: 25 TABLET, EXTENDED RELEASE ORAL at 07:54

## 2024-01-01 RX ADMIN — SODIUM CHLORIDE, PRESERVATIVE FREE 10 ML: 5 INJECTION INTRAVENOUS at 09:02

## 2024-01-01 RX ADMIN — BENZONATATE 100 MG: 100 CAPSULE ORAL at 21:03

## 2024-01-01 RX ADMIN — BUMETANIDE 1 MG: 1 TABLET ORAL at 08:56

## 2024-01-01 RX ADMIN — FUROSEMIDE 20 MG: 10 INJECTION, SOLUTION INTRAMUSCULAR; INTRAVENOUS at 10:04

## 2024-01-01 RX ADMIN — METOPROLOL SUCCINATE 50 MG: 25 TABLET, EXTENDED RELEASE ORAL at 08:40

## 2024-01-01 RX ADMIN — SPIRONOLACTONE 12.5 MG: 25 TABLET ORAL at 09:09

## 2024-01-01 RX ADMIN — SODIUM CHLORIDE, PRESERVATIVE FREE 10 ML: 5 INJECTION INTRAVENOUS at 08:34

## 2024-01-01 RX ADMIN — RANOLAZINE 500 MG: 500 TABLET, FILM COATED, EXTENDED RELEASE ORAL at 08:26

## 2024-01-01 RX ADMIN — ASPIRIN 81 MG: 81 TABLET, CHEWABLE ORAL at 09:48

## 2024-01-01 RX ADMIN — SODIUM CHLORIDE, PRESERVATIVE FREE 10 ML: 5 INJECTION INTRAVENOUS at 09:17

## 2024-01-01 RX ADMIN — SODIUM CHLORIDE, PRESERVATIVE FREE 10 ML: 5 INJECTION INTRAVENOUS at 20:51

## 2024-01-01 RX ADMIN — APIXABAN 5 MG: 5 TABLET, FILM COATED ORAL at 09:01

## 2024-01-01 RX ADMIN — EMPAGLIFLOZIN 10 MG: 10 TABLET, FILM COATED ORAL at 09:48

## 2024-01-01 RX ADMIN — ACETAMINOPHEN 650 MG: 325 TABLET ORAL at 08:31

## 2024-01-01 RX ADMIN — HYDROCODONE BITARTRATE AND HOMATROPINE METHYLBROMIDE 5 ML: 5; 1.5 SOLUTION ORAL at 06:18

## 2024-01-01 RX ADMIN — RANOLAZINE 500 MG: 500 TABLET, FILM COATED, EXTENDED RELEASE ORAL at 08:49

## 2024-01-01 RX ADMIN — METOPROLOL SUCCINATE 50 MG: 25 TABLET, EXTENDED RELEASE ORAL at 20:30

## 2024-01-01 RX ADMIN — GUAIFENESIN 200 MG: 200 SOLUTION ORAL at 04:19

## 2024-01-01 RX ADMIN — BENZONATATE 100 MG: 100 CAPSULE ORAL at 09:17

## 2024-01-01 RX ADMIN — BENZONATATE 100 MG: 100 CAPSULE ORAL at 23:12

## 2024-01-01 RX ADMIN — FUROSEMIDE 60 MG: 10 INJECTION, SOLUTION INTRAMUSCULAR; INTRAVENOUS at 09:49

## 2024-01-01 RX ADMIN — APIXABAN 5 MG: 5 TABLET, FILM COATED ORAL at 20:44

## 2024-01-01 RX ADMIN — METOPROLOL SUCCINATE 25 MG: 25 TABLET, EXTENDED RELEASE ORAL at 09:01

## 2024-01-01 RX ADMIN — RANOLAZINE 500 MG: 500 TABLET, FILM COATED, EXTENDED RELEASE ORAL at 21:08

## 2024-01-01 RX ADMIN — METOPROLOL SUCCINATE 25 MG: 25 TABLET, EXTENDED RELEASE ORAL at 09:48

## 2024-01-01 RX ADMIN — BUMETANIDE 1 MG: 1 TABLET ORAL at 08:48

## 2024-01-01 RX ADMIN — SPIRONOLACTONE 12.5 MG: 25 TABLET ORAL at 09:01

## 2024-01-01 RX ADMIN — APIXABAN 5 MG: 5 TABLET, FILM COATED ORAL at 20:31

## 2024-01-01 RX ADMIN — GUAIFENESIN 200 MG: 200 SOLUTION ORAL at 09:03

## 2024-01-01 RX ADMIN — GUAIFENESIN 1200 MG: 600 TABLET ORAL at 21:15

## 2024-01-01 RX ADMIN — METOPROLOL SUCCINATE 25 MG: 25 TABLET, EXTENDED RELEASE ORAL at 20:34

## 2024-01-01 RX ADMIN — OXYCODONE HYDROCHLORIDE AND ACETAMINOPHEN 1 TABLET: 5; 325 TABLET ORAL at 17:44

## 2024-01-01 RX ADMIN — SPIRONOLACTONE 12.5 MG: 25 TABLET ORAL at 09:49

## 2024-01-01 RX ADMIN — APIXABAN 5 MG: 5 TABLET, FILM COATED ORAL at 09:08

## 2024-01-01 RX ADMIN — GUAIFENESIN 1200 MG: 600 TABLET ORAL at 21:08

## 2024-01-01 RX ADMIN — APIXABAN 5 MG: 5 TABLET, FILM COATED ORAL at 20:53

## 2024-01-01 RX ADMIN — BENZONATATE 100 MG: 100 CAPSULE ORAL at 08:56

## 2024-01-01 RX ADMIN — ACETAMINOPHEN 650 MG: 325 TABLET ORAL at 03:35

## 2024-01-01 RX ADMIN — APIXABAN 5 MG: 5 TABLET, FILM COATED ORAL at 21:49

## 2024-01-01 RX ADMIN — ASPIRIN 81 MG: 81 TABLET, CHEWABLE ORAL at 08:26

## 2024-01-01 RX ADMIN — BUMETANIDE 2 MG: 1 TABLET ORAL at 09:01

## 2024-01-01 RX ADMIN — EMPAGLIFLOZIN 10 MG: 10 TABLET, FILM COATED ORAL at 09:01

## 2024-01-01 RX ADMIN — HYDROCODONE BITARTRATE AND HOMATROPINE METHYLBROMIDE 5 ML: 5; 1.5 SOLUTION ORAL at 16:02

## 2024-01-01 RX ADMIN — ACETAMINOPHEN 650 MG: 325 TABLET ORAL at 09:54

## 2024-01-01 RX ADMIN — BENZONATATE 100 MG: 100 CAPSULE ORAL at 22:37

## 2024-01-01 RX ADMIN — GUAIFENESIN 200 MG: 200 SOLUTION ORAL at 09:02

## 2024-01-01 RX ADMIN — FUROSEMIDE 60 MG: 10 INJECTION, SOLUTION INTRAMUSCULAR; INTRAVENOUS at 18:24

## 2024-01-01 RX ADMIN — POLYETHYLENE GLYCOL 3350 17 G: 17 POWDER, FOR SOLUTION ORAL at 21:08

## 2024-01-01 RX ADMIN — BUMETANIDE 1 MG: 1 TABLET ORAL at 07:54

## 2024-01-01 RX ADMIN — EMPAGLIFLOZIN 10 MG: 10 TABLET, FILM COATED ORAL at 09:15

## 2024-01-01 RX ADMIN — FUROSEMIDE 60 MG: 10 INJECTION, SOLUTION INTRAMUSCULAR; INTRAVENOUS at 08:40

## 2024-01-01 RX ADMIN — SPIRONOLACTONE 12.5 MG: 25 TABLET ORAL at 09:15

## 2024-01-01 RX ADMIN — ASPIRIN 81 MG: 81 TABLET, CHEWABLE ORAL at 09:09

## 2024-01-01 RX ADMIN — METOPROLOL SUCCINATE 25 MG: 25 TABLET, EXTENDED RELEASE ORAL at 21:30

## 2024-01-01 RX ADMIN — RANOLAZINE 500 MG: 500 TABLET, FILM COATED, EXTENDED RELEASE ORAL at 08:56

## 2024-01-01 RX ADMIN — GUAIFENESIN 1200 MG: 600 TABLET ORAL at 09:08

## 2024-01-01 RX ADMIN — SODIUM CHLORIDE SOLN NEBU 3% 4 ML: 3 NEBU SOLN at 21:09

## 2024-01-01 RX ADMIN — BUMETANIDE 1 MG: 1 TABLET ORAL at 08:02

## 2024-01-01 RX ADMIN — APIXABAN 5 MG: 5 TABLET, FILM COATED ORAL at 09:47

## 2024-01-01 RX ADMIN — DICLOFENAC SODIUM 2 G: 10 GEL TOPICAL at 03:36

## 2024-01-01 RX ADMIN — EMPAGLIFLOZIN 10 MG: 10 TABLET, FILM COATED ORAL at 08:02

## 2024-01-01 RX ADMIN — PHENOL 1 SPRAY: 1.5 LIQUID ORAL at 03:38

## 2024-01-01 RX ADMIN — BUMETANIDE 2 MG: 1 TABLET ORAL at 17:31

## 2024-01-01 RX ADMIN — APIXABAN 5 MG: 5 TABLET, FILM COATED ORAL at 08:32

## 2024-01-01 RX ADMIN — SODIUM CHLORIDE SOLN NEBU 3% 4 ML: 3 NEBU SOLN at 21:30

## 2024-01-01 RX ADMIN — RANOLAZINE 500 MG: 500 TABLET, FILM COATED, EXTENDED RELEASE ORAL at 20:48

## 2024-01-01 RX ADMIN — SODIUM CHLORIDE, PRESERVATIVE FREE 10 ML: 5 INJECTION INTRAVENOUS at 07:55

## 2024-01-01 RX ADMIN — SODIUM CHLORIDE, PRESERVATIVE FREE 10 ML: 5 INJECTION INTRAVENOUS at 21:15

## 2024-01-01 RX ADMIN — GUAIFENESIN 1200 MG: 600 TABLET ORAL at 20:53

## 2024-01-01 RX ADMIN — ACETAMINOPHEN 650 MG: 325 TABLET ORAL at 21:39

## 2024-01-01 RX ADMIN — SPIRONOLACTONE 12.5 MG: 25 TABLET ORAL at 08:23

## 2024-01-01 RX ADMIN — METOPROLOL SUCCINATE 25 MG: 25 TABLET, EXTENDED RELEASE ORAL at 08:02

## 2024-01-01 RX ADMIN — GUAIFENESIN 1200 MG: 600 TABLET ORAL at 07:55

## 2024-01-01 RX ADMIN — BENZONATATE 100 MG: 100 CAPSULE ORAL at 00:03

## 2024-01-01 RX ADMIN — HYDROCODONE BITARTRATE AND HOMATROPINE METHYLBROMIDE 5 ML: 5; 1.5 SOLUTION ORAL at 18:26

## 2024-01-01 RX ADMIN — BENZONATATE 100 MG: 100 CAPSULE ORAL at 15:18

## 2024-01-01 RX ADMIN — RANOLAZINE 500 MG: 500 TABLET, FILM COATED, EXTENDED RELEASE ORAL at 09:08

## 2024-01-01 RX ADMIN — GUAIFENESIN 1200 MG: 600 TABLET ORAL at 08:50

## 2024-01-01 RX ADMIN — HYDROCODONE BITARTRATE AND HOMATROPINE METHYLBROMIDE 5 ML: 5; 1.5 SOLUTION ORAL at 00:03

## 2024-01-01 RX ADMIN — APIXABAN 5 MG: 5 TABLET, FILM COATED ORAL at 20:29

## 2024-01-01 RX ADMIN — POLYETHYLENE GLYCOL 3350 17 G: 17 POWDER, FOR SOLUTION ORAL at 09:28

## 2024-01-01 RX ADMIN — SODIUM CHLORIDE, PRESERVATIVE FREE 10 ML: 5 INJECTION INTRAVENOUS at 20:34

## 2024-01-01 RX ADMIN — SODIUM CHLORIDE, PRESERVATIVE FREE 10 ML: 5 INJECTION INTRAVENOUS at 08:03

## 2024-01-01 RX ADMIN — BENZONATATE 100 MG: 100 CAPSULE ORAL at 00:24

## 2024-01-01 RX ADMIN — BUMETANIDE 2 MG: 1 TABLET ORAL at 08:22

## 2024-01-01 RX ADMIN — RANOLAZINE 500 MG: 500 TABLET, FILM COATED, EXTENDED RELEASE ORAL at 20:26

## 2024-01-01 RX ADMIN — SODIUM CHLORIDE, PRESERVATIVE FREE 10 ML: 5 INJECTION INTRAVENOUS at 20:48

## 2024-01-01 RX ADMIN — RANOLAZINE 500 MG: 500 TABLET, FILM COATED, EXTENDED RELEASE ORAL at 20:53

## 2024-01-01 RX ADMIN — ASPIRIN 81 MG: 81 TABLET, CHEWABLE ORAL at 08:23

## 2024-01-01 RX ADMIN — RANOLAZINE 500 MG: 500 TABLET, FILM COATED, EXTENDED RELEASE ORAL at 21:40

## 2024-01-01 RX ADMIN — HYDROCODONE BITARTRATE AND HOMATROPINE METHYLBROMIDE 5 ML: 5; 1.5 SOLUTION ORAL at 15:45

## 2024-01-01 RX ADMIN — ACETAMINOPHEN 650 MG: 325 TABLET ORAL at 01:48

## 2024-01-01 RX ADMIN — ASPIRIN 81 MG: 81 TABLET, CHEWABLE ORAL at 08:02

## 2024-01-01 RX ADMIN — METOPROLOL SUCCINATE 25 MG: 25 TABLET, EXTENDED RELEASE ORAL at 08:26

## 2024-01-01 RX ADMIN — SODIUM CHLORIDE, PRESERVATIVE FREE 10 ML: 5 INJECTION INTRAVENOUS at 08:30

## 2024-01-01 RX ADMIN — POLYETHYLENE GLYCOL 3350 17 G: 17 POWDER, FOR SOLUTION ORAL at 08:31

## 2024-01-01 RX ADMIN — BUMETANIDE 1 MG: 1 TABLET ORAL at 17:44

## 2024-01-01 RX ADMIN — BUMETANIDE 1 MG: 1 TABLET ORAL at 17:19

## 2024-01-01 RX ADMIN — ACETAMINOPHEN 650 MG: 325 TABLET ORAL at 22:01

## 2024-01-01 RX ADMIN — RANOLAZINE 500 MG: 500 TABLET, FILM COATED, EXTENDED RELEASE ORAL at 07:55

## 2024-01-01 RX ADMIN — OXYCODONE HYDROCHLORIDE 10 MG: 10 TABLET, FILM COATED, EXTENDED RELEASE ORAL at 20:07

## 2024-01-01 RX ADMIN — FUROSEMIDE 40 MG: 10 INJECTION, SOLUTION INTRAMUSCULAR; INTRAVENOUS at 08:33

## 2024-01-01 RX ADMIN — APIXABAN 5 MG: 5 TABLET, FILM COATED ORAL at 20:48

## 2024-01-01 RX ADMIN — HYDROCODONE BITARTRATE AND HOMATROPINE METHYLBROMIDE 5 ML: 5; 1.5 SOLUTION ORAL at 06:00

## 2024-01-01 RX ADMIN — HYDROCODONE BITARTRATE AND HOMATROPINE METHYLBROMIDE 5 ML: 5; 1.5 SOLUTION ORAL at 22:37

## 2024-01-01 RX ADMIN — GUAIFENESIN 200 MG: 200 SOLUTION ORAL at 13:30

## 2024-01-01 RX ADMIN — SODIUM CHLORIDE, PRESERVATIVE FREE 5 ML: 5 INJECTION INTRAVENOUS at 22:02

## 2024-01-01 RX ADMIN — BUMETANIDE 1 MG: 1 TABLET ORAL at 17:23

## 2024-01-01 RX ADMIN — PHENOL 1 SPRAY: 1.5 LIQUID ORAL at 20:59

## 2024-01-01 RX ADMIN — SPIRONOLACTONE 12.5 MG: 25 TABLET ORAL at 12:00

## 2024-01-01 RX ADMIN — APIXABAN 5 MG: 5 TABLET, FILM COATED ORAL at 20:26

## 2024-01-01 RX ADMIN — RANOLAZINE 500 MG: 500 TABLET, FILM COATED, EXTENDED RELEASE ORAL at 21:15

## 2024-01-01 RX ADMIN — ACETAMINOPHEN 650 MG: 325 TABLET ORAL at 15:30

## 2024-01-01 RX ADMIN — METOPROLOL SUCCINATE 25 MG: 25 TABLET, EXTENDED RELEASE ORAL at 21:08

## 2024-01-01 RX ADMIN — EMPAGLIFLOZIN 10 MG: 10 TABLET, FILM COATED ORAL at 08:50

## 2024-01-01 RX ADMIN — METOPROLOL SUCCINATE 25 MG: 25 TABLET, EXTENDED RELEASE ORAL at 08:30

## 2024-01-01 RX ADMIN — ASPIRIN 81 MG: 81 TABLET, CHEWABLE ORAL at 09:15

## 2024-01-01 RX ADMIN — BUMETANIDE 1 MG: 1 TABLET ORAL at 17:51

## 2024-01-01 RX ADMIN — HYDROCODONE BITARTRATE AND HOMATROPINE METHYLBROMIDE 5 ML: 5; 1.5 SOLUTION ORAL at 07:29

## 2024-01-01 RX ADMIN — LEVALBUTEROL HYDROCHLORIDE 0.63 MG: 0.63 SOLUTION RESPIRATORY (INHALATION) at 21:36

## 2024-01-01 RX ADMIN — RANOLAZINE 500 MG: 500 TABLET, FILM COATED, EXTENDED RELEASE ORAL at 20:56

## 2024-01-01 RX ADMIN — APIXABAN 5 MG: 5 TABLET, FILM COATED ORAL at 08:49

## 2024-01-01 RX ADMIN — GUAIFENESIN 1200 MG: 600 TABLET ORAL at 08:56

## 2024-01-01 RX ADMIN — FUROSEMIDE 60 MG: 10 INJECTION, SOLUTION INTRAMUSCULAR; INTRAVENOUS at 08:30

## 2024-01-01 RX ADMIN — EMPAGLIFLOZIN 10 MG: 10 TABLET, FILM COATED ORAL at 08:30

## 2024-01-01 RX ADMIN — OXYCODONE HYDROCHLORIDE AND ACETAMINOPHEN 1 TABLET: 5; 325 TABLET ORAL at 00:30

## 2024-01-01 RX ADMIN — METOPROLOL SUCCINATE 25 MG: 25 TABLET, EXTENDED RELEASE ORAL at 09:09

## 2024-01-01 RX ADMIN — EMPAGLIFLOZIN 10 MG: 10 TABLET, FILM COATED ORAL at 08:23

## 2024-01-01 RX ADMIN — RANOLAZINE 500 MG: 500 TABLET, FILM COATED, EXTENDED RELEASE ORAL at 22:01

## 2024-01-01 ASSESSMENT — PAIN SCALES - GENERAL
PAINLEVEL_OUTOF10: 0
PAINLEVEL_OUTOF10: 0
PAINLEVEL_OUTOF10: 3
PAINLEVEL_OUTOF10: 0
PAINLEVEL_OUTOF10: 3
PAINLEVEL_OUTOF10: 5
PAINLEVEL_OUTOF10: 6
PAINLEVEL_OUTOF10: 0
PAINLEVEL_OUTOF10: 6
PAINLEVEL_OUTOF10: 8
PAINLEVEL_OUTOF10: 3
PAINLEVEL_OUTOF10: 6
PAINLEVEL_OUTOF10: 0
PAINLEVEL_OUTOF10: 1
PAINLEVEL_OUTOF10: 7
PAINLEVEL_OUTOF10: 9
PAINLEVEL_OUTOF10: 0
PAINLEVEL_OUTOF10: 5
PAINLEVEL_OUTOF10: 0
PAINLEVEL_OUTOF10: 2
PAINLEVEL_OUTOF10: 0
PAINLEVEL_OUTOF10: 2
PAINLEVEL_OUTOF10: 0
PAINLEVEL_OUTOF10: 0
PAINLEVEL_OUTOF10: 3
PAINLEVEL_OUTOF10: 0
PAINLEVEL_OUTOF10: 3
PAINLEVEL_OUTOF10: 6
PAINLEVEL_OUTOF10: 0
PAINLEVEL_OUTOF10: 10
PAINLEVEL_OUTOF10: 6
PAINLEVEL_OUTOF10: 0
PAINLEVEL_OUTOF10: 5
PAINLEVEL_OUTOF10: 0

## 2024-01-01 ASSESSMENT — PAIN DESCRIPTION - LOCATION
LOCATION: HIP
LOCATION: KNEE
LOCATION: HIP
LOCATION: HIP
LOCATION: LEG
LOCATION: SACRUM
LOCATION: BUTTOCKS
LOCATION: HIP
LOCATION: CHEST
LOCATION: BACK
LOCATION: HIP
LOCATION: KNEE
LOCATION: SACRUM
LOCATION: HIP

## 2024-01-01 ASSESSMENT — PAIN DESCRIPTION - FREQUENCY
FREQUENCY: INTERMITTENT
FREQUENCY: CONTINUOUS
FREQUENCY: INTERMITTENT

## 2024-01-01 ASSESSMENT — PAIN DESCRIPTION - ORIENTATION
ORIENTATION: RIGHT
ORIENTATION: RIGHT
ORIENTATION: MID
ORIENTATION: RIGHT;LEFT
ORIENTATION: LEFT
ORIENTATION: LEFT
ORIENTATION: MID
ORIENTATION: RIGHT;LEFT
ORIENTATION: RIGHT;LEFT
ORIENTATION: RIGHT
ORIENTATION: LEFT
ORIENTATION: RIGHT;LEFT
ORIENTATION: LEFT;RIGHT

## 2024-01-01 ASSESSMENT — PAIN - FUNCTIONAL ASSESSMENT
PAIN_FUNCTIONAL_ASSESSMENT: ACTIVITIES ARE NOT PREVENTED
PAIN_FUNCTIONAL_ASSESSMENT: ACTIVITIES ARE NOT PREVENTED
PAIN_FUNCTIONAL_ASSESSMENT: PREVENTS OR INTERFERES SOME ACTIVE ACTIVITIES AND ADLS
PAIN_FUNCTIONAL_ASSESSMENT: PREVENTS OR INTERFERES WITH ALL ACTIVE AND SOME PASSIVE ACTIVITIES
PAIN_FUNCTIONAL_ASSESSMENT: PREVENTS OR INTERFERES SOME ACTIVE ACTIVITIES AND ADLS
PAIN_FUNCTIONAL_ASSESSMENT: 0-10
PAIN_FUNCTIONAL_ASSESSMENT: 0-10
PAIN_FUNCTIONAL_ASSESSMENT: ACTIVITIES ARE NOT PREVENTED

## 2024-01-01 ASSESSMENT — PAIN DESCRIPTION - PAIN TYPE
TYPE: CHRONIC PAIN

## 2024-01-01 ASSESSMENT — PAIN DESCRIPTION - DESCRIPTORS
DESCRIPTORS: SHARP
DESCRIPTORS: ACHING
DESCRIPTORS: ACHING;DISCOMFORT
DESCRIPTORS: ACHING
DESCRIPTORS: ACHING;DISCOMFORT;SORE;PRESSURE
DESCRIPTORS: ACHING;DISCOMFORT;SORE
DESCRIPTORS: SHARP;SHOOTING
DESCRIPTORS: ACHING
DESCRIPTORS: DISCOMFORT;OTHER (COMMENT)
DESCRIPTORS: ACHING
DESCRIPTORS: ACHING
DESCRIPTORS: SORE;SHARP;ACHING

## 2024-01-01 ASSESSMENT — PAIN DESCRIPTION - ONSET
ONSET: ON-GOING

## 2024-01-01 ASSESSMENT — LIFESTYLE VARIABLES
HOW MANY STANDARD DRINKS CONTAINING ALCOHOL DO YOU HAVE ON A TYPICAL DAY: PATIENT DOES NOT DRINK
HOW OFTEN DO YOU HAVE A DRINK CONTAINING ALCOHOL: NEVER

## 2024-01-01 ASSESSMENT — PAIN SCALES - WONG BAKER
WONGBAKER_NUMERICALRESPONSE: HURTS LITTLE MORE
WONGBAKER_NUMERICALRESPONSE: HURTS LITTLE MORE

## 2024-02-07 ENCOUNTER — CLINICAL DOCUMENTATION (OUTPATIENT)
Age: 79
End: 2024-02-07

## 2024-02-07 NOTE — PROGRESS NOTES
Repatha 140MG/ML syringes    CaseId:[id number];Status:Approved;Review Type:Prior Auth;Coverage Start Date:01/08/2024;Coverage End Date:02/06/2025;

## 2024-02-13 ENCOUNTER — TELEPHONE (OUTPATIENT)
Age: 79
End: 2024-02-13

## 2024-02-13 NOTE — TELEPHONE ENCOUNTER
Spoke with Heather at pharmacist, at [facility] , to inform that Repatha has been approved . Per Heather, needs  new prescription. New prescription sent to pharmacy for repatha//brendab  Requested Prescriptions     Signed Prescriptions Disp Refills    Evolocumab 140 MG/ML SOAJ 6 Adjustable Dose Pre-filled Pen Syringe 3     Sig: Inject 140 mg into the skin every 14 days     Authorizing Provider: MICHAEL OLVERA     Ordering User: ANA YBARRA

## 2024-02-27 ENCOUNTER — TELEPHONE (OUTPATIENT)
Age: 79
End: 2024-02-27

## 2024-02-27 NOTE — TELEPHONE ENCOUNTER
Wife called, informed that compete blood work was done in August 2023. he does not need any lab work done unless he has something that he would like to have checked out. Patient states that he sees Dr. Yip in June and will have him order labs. Wife thanked me for calling//ramsey

## 2024-02-27 NOTE — TELEPHONE ENCOUNTER
Pt's wife called to see if Dr. Boyer wants the pt to have any labwork prior to his appt. States they normally get their labs done with their PCP but their PCP didn't order labs at his past visit.

## 2024-03-14 ENCOUNTER — OFFICE VISIT (OUTPATIENT)
Age: 79
End: 2024-03-14
Payer: MEDICARE

## 2024-03-14 VITALS
BODY MASS INDEX: 36.83 KG/M2 | DIASTOLIC BLOOD PRESSURE: 62 MMHG | SYSTOLIC BLOOD PRESSURE: 104 MMHG | HEIGHT: 68 IN | WEIGHT: 243 LBS | HEART RATE: 72 BPM

## 2024-03-14 DIAGNOSIS — E78.2 MIXED HYPERLIPIDEMIA: ICD-10-CM

## 2024-03-14 DIAGNOSIS — I65.23 CAROTID STENOSIS, BILATERAL: ICD-10-CM

## 2024-03-14 DIAGNOSIS — Z86.718 HISTORY OF DVT (DEEP VEIN THROMBOSIS): ICD-10-CM

## 2024-03-14 DIAGNOSIS — R60.0 LOCALIZED EDEMA: ICD-10-CM

## 2024-03-14 DIAGNOSIS — I25.10 CORONARY ARTERY DISEASE INVOLVING NATIVE CORONARY ARTERY OF NATIVE HEART WITHOUT ANGINA PECTORIS: Primary | ICD-10-CM

## 2024-03-14 PROCEDURE — 99214 OFFICE O/P EST MOD 30 MIN: CPT | Performed by: INTERNAL MEDICINE

## 2024-03-14 PROCEDURE — G8427 DOCREV CUR MEDS BY ELIG CLIN: HCPCS | Performed by: INTERNAL MEDICINE

## 2024-03-14 PROCEDURE — G8417 CALC BMI ABV UP PARAM F/U: HCPCS | Performed by: INTERNAL MEDICINE

## 2024-03-14 PROCEDURE — 3074F SYST BP LT 130 MM HG: CPT | Performed by: INTERNAL MEDICINE

## 2024-03-14 PROCEDURE — 1036F TOBACCO NON-USER: CPT | Performed by: INTERNAL MEDICINE

## 2024-03-14 PROCEDURE — G8484 FLU IMMUNIZE NO ADMIN: HCPCS | Performed by: INTERNAL MEDICINE

## 2024-03-14 PROCEDURE — 1123F ACP DISCUSS/DSCN MKR DOCD: CPT | Performed by: INTERNAL MEDICINE

## 2024-03-14 PROCEDURE — 3078F DIAST BP <80 MM HG: CPT | Performed by: INTERNAL MEDICINE

## 2024-03-14 ASSESSMENT — ENCOUNTER SYMPTOMS: SHORTNESS OF BREATH: 0

## 2024-03-14 NOTE — PROGRESS NOTES
high risk for possible surgery.  Monitor if any symptoms.             Return in about 6 months (around 9/14/2024).       Rigo Boyer MD  3/14/2024  3:05 PM    *Portions of the record have been created with voice recognition software. Occasional wrong-word or 'sound-a-like' substitutions may have occurred due to the inherent limitations of voice recognition software. Read the chart carefully and recognize, using context, where substitutions have occurred.

## 2024-04-02 NOTE — TELEPHONE ENCOUNTER
Requested Prescriptions     Signed Prescriptions Disp Refills    metoprolol tartrate (LOPRESSOR) 25 MG tablet 180 tablet 2     Sig: Take 1 tablet by mouth twice daily     Authorizing Provider: MICHAEL OLVERA     Ordering User: CARMENZA YAÑEZ       Rx verified.   next OV 9/30/24

## 2024-04-11 ENCOUNTER — OFFICE VISIT (OUTPATIENT)
Dept: VASCULAR SURGERY | Age: 79
End: 2024-04-11
Payer: MEDICARE

## 2024-04-11 VITALS
HEIGHT: 68 IN | OXYGEN SATURATION: 94 % | BODY MASS INDEX: 35.77 KG/M2 | HEART RATE: 65 BPM | SYSTOLIC BLOOD PRESSURE: 153 MMHG | DIASTOLIC BLOOD PRESSURE: 80 MMHG | WEIGHT: 236 LBS

## 2024-04-11 DIAGNOSIS — I65.23 BILATERAL CAROTID ARTERY STENOSIS: Primary | ICD-10-CM

## 2024-04-11 PROCEDURE — 3078F DIAST BP <80 MM HG: CPT | Performed by: STUDENT IN AN ORGANIZED HEALTH CARE EDUCATION/TRAINING PROGRAM

## 2024-04-11 PROCEDURE — 1036F TOBACCO NON-USER: CPT | Performed by: STUDENT IN AN ORGANIZED HEALTH CARE EDUCATION/TRAINING PROGRAM

## 2024-04-11 PROCEDURE — G8427 DOCREV CUR MEDS BY ELIG CLIN: HCPCS | Performed by: STUDENT IN AN ORGANIZED HEALTH CARE EDUCATION/TRAINING PROGRAM

## 2024-04-11 PROCEDURE — G8417 CALC BMI ABV UP PARAM F/U: HCPCS | Performed by: STUDENT IN AN ORGANIZED HEALTH CARE EDUCATION/TRAINING PROGRAM

## 2024-04-11 PROCEDURE — 99213 OFFICE O/P EST LOW 20 MIN: CPT | Performed by: STUDENT IN AN ORGANIZED HEALTH CARE EDUCATION/TRAINING PROGRAM

## 2024-04-11 PROCEDURE — 1123F ACP DISCUSS/DSCN MKR DOCD: CPT | Performed by: STUDENT IN AN ORGANIZED HEALTH CARE EDUCATION/TRAINING PROGRAM

## 2024-04-11 PROCEDURE — 3077F SYST BP >= 140 MM HG: CPT | Performed by: STUDENT IN AN ORGANIZED HEALTH CARE EDUCATION/TRAINING PROGRAM

## 2024-04-11 NOTE — PROGRESS NOTES
Laterality Date    CAROTID ENDARTERECTOMY Right 2017    CAROTID ENDARTERECTOMY Left     ERCP      x 4    HEENT      wisdom teeth ext    [place] UNLISTED PROCEDURE CARDIAC SURGERY      stents, CABG x 3       Metal implants or AICD: no    Dye allergy: no     Smoker:  Tobacco Use      Smoking status: Former        Packs/day: 0.00        Types: Cigarettes        Quit date: 1962        Years since quittin.3      Smokeless tobacco: Never      Tobacco comments: Quit smoking: quit age 20      Referred by: No ref. provider found    PCP:Reynold Yip MD Jeffrey Thomas Johnston, MD    Elements of this note have been dictated using speech recognition software. As a result, errors of speech recognition may have occurred.

## 2024-05-29 ENCOUNTER — TELEPHONE (OUTPATIENT)
Age: 79
End: 2024-05-29

## 2024-05-29 NOTE — TELEPHONE ENCOUNTER
Patient was in hospital for cellulitis at [facility]. States that he had a heart attack 2 weeks ago. Heart cath done showed bad vascular disease in heart and legs. Patient is now in [place] for cellulitis is getting worse. Wife states that she wanted to let Dr. Boyer know. Patient is now on palliative care. Wife states that patient is not doing well.     Wife states that there was a conversation about patient transferring his cardiac care to [facility] since he was in [facility]. She states that she was not at the hospital when this was done. Wife states that is not going to happen as patient was on a lot of pain medications and wanted us to know that his care is not to be transferred.//ramsey

## 2024-06-07 ENCOUNTER — OFFICE VISIT (OUTPATIENT)
Age: 79
End: 2024-06-07
Payer: MEDICARE

## 2024-06-07 VITALS
HEART RATE: 72 BPM | SYSTOLIC BLOOD PRESSURE: 128 MMHG | DIASTOLIC BLOOD PRESSURE: 68 MMHG | WEIGHT: 222 LBS | BODY MASS INDEX: 33.65 KG/M2 | HEIGHT: 68 IN

## 2024-06-07 DIAGNOSIS — E66.9 OBESITY (BMI 30-39.9): ICD-10-CM

## 2024-06-07 DIAGNOSIS — I10 PRIMARY HYPERTENSION: ICD-10-CM

## 2024-06-07 DIAGNOSIS — I25.119 CORONARY ARTERY DISEASE INVOLVING NATIVE CORONARY ARTERY OF NATIVE HEART WITH ANGINA PECTORIS (HCC): Primary | ICD-10-CM

## 2024-06-07 DIAGNOSIS — Z86.718 HISTORY OF DVT (DEEP VEIN THROMBOSIS): ICD-10-CM

## 2024-06-07 DIAGNOSIS — E78.2 MIXED HYPERLIPIDEMIA: ICD-10-CM

## 2024-06-07 DIAGNOSIS — C79.51 PROSTATE CANCER METASTATIC TO BONE (HCC): ICD-10-CM

## 2024-06-07 DIAGNOSIS — C61 PROSTATE CANCER METASTATIC TO BONE (HCC): ICD-10-CM

## 2024-06-07 PROBLEM — R07.9 CHEST PAIN: Status: RESOLVED | Noted: 2019-02-20 | Resolved: 2024-06-07

## 2024-06-07 PROCEDURE — 99214 OFFICE O/P EST MOD 30 MIN: CPT | Performed by: INTERNAL MEDICINE

## 2024-06-07 PROCEDURE — 1036F TOBACCO NON-USER: CPT | Performed by: INTERNAL MEDICINE

## 2024-06-07 PROCEDURE — G8417 CALC BMI ABV UP PARAM F/U: HCPCS | Performed by: INTERNAL MEDICINE

## 2024-06-07 PROCEDURE — 3078F DIAST BP <80 MM HG: CPT | Performed by: INTERNAL MEDICINE

## 2024-06-07 PROCEDURE — 1123F ACP DISCUSS/DSCN MKR DOCD: CPT | Performed by: INTERNAL MEDICINE

## 2024-06-07 PROCEDURE — 3074F SYST BP LT 130 MM HG: CPT | Performed by: INTERNAL MEDICINE

## 2024-06-07 PROCEDURE — G8427 DOCREV CUR MEDS BY ELIG CLIN: HCPCS | Performed by: INTERNAL MEDICINE

## 2024-06-07 RX ORDER — LINEZOLID 600 MG/1
600 TABLET, FILM COATED ORAL 2 TIMES DAILY
COMMUNITY
Start: 2024-06-05

## 2024-06-07 RX ORDER — FUROSEMIDE 20 MG/1
20 TABLET ORAL DAILY
COMMUNITY
Start: 2024-05-24

## 2024-06-07 RX ORDER — CEFADROXIL 500 MG/1
500 CAPSULE ORAL
COMMUNITY
Start: 2024-05-17

## 2024-06-07 RX ORDER — CLOPIDOGREL BISULFATE 75 MG/1
75 TABLET ORAL DAILY
COMMUNITY
Start: 2024-05-18 | End: 2024-06-17

## 2024-06-07 ASSESSMENT — ENCOUNTER SYMPTOMS: SHORTNESS OF BREATH: 0

## 2024-06-07 NOTE — PROGRESS NOTES
Alcohol/week: 0.0 standard drinks of alcohol       ROS:    Review of Systems   Constitutional: Negative for malaise/fatigue.   Cardiovascular:  Positive for chest pain.   Respiratory:  Negative for shortness of breath.    Musculoskeletal:  Positive for arthritis.   Neurological:  Negative for focal weakness.   Psychiatric/Behavioral:  Negative for depression.            PHYSICAL EXAM:  Wt Readings from Last 3 Encounters:   06/07/24 100.7 kg (222 lb)   04/11/24 107 kg (236 lb)   03/14/24 110.2 kg (243 lb)     BP Readings from Last 3 Encounters:   06/07/24 128/68   04/11/24 (!) 153/80   03/14/24 104/62     Pulse Readings from Last 3 Encounters:   06/07/24 72   04/11/24 65   03/14/24 72       Physical Exam  Constitutional:       General: He is not in acute distress.  Neck:      Vascular: No carotid bruit.   Cardiovascular:      Rate and Rhythm: Normal rate and regular rhythm.   Pulmonary:      Effort: No respiratory distress.      Breath sounds: Normal breath sounds.   Abdominal:      General: Bowel sounds are normal.      Palpations: Abdomen is soft.   Musculoskeletal:         General: No swelling.   Skin:     General: Skin is warm and dry.   Neurological:      General: No focal deficit present.   Psychiatric:         Mood and Affect: Mood normal.         Medical problems and test results were reviewed with the patient today.       Lab Results   Component Value Date    WBC 6.4 11/05/2019    HGB 16.9 11/05/2019    HCT 47.8 11/05/2019    MCV 95.2 11/05/2019     11/05/2019       Lab Results   Component Value Date/Time     11/05/2019 11:28 AM    K 4.2 11/05/2019 11:28 AM     11/05/2019 11:28 AM    CO2 29 11/05/2019 11:28 AM    BUN 21 11/05/2019 11:28 AM    CREATININE 1.30 11/05/2019 11:28 AM    GLUCOSE 106 11/05/2019 11:28 AM    CALCIUM 9.6 11/05/2019 11:28 AM        Lab Results   Component Value Date    CHOL 197 11/05/2019     Lab Results   Component Value Date    TRIG 397 (H) 11/05/2019     Lab

## 2024-06-24 ENCOUNTER — HOSPITAL ENCOUNTER (INPATIENT)
Age: 79
LOS: 1 days | Discharge: HOME | DRG: 311 | End: 2024-06-26
Attending: EMERGENCY MEDICINE | Admitting: INTERNAL MEDICINE
Payer: MEDICARE

## 2024-06-24 ENCOUNTER — TELEPHONE (OUTPATIENT)
Dept: CARDIOLOGY | Age: 79
End: 2024-06-24

## 2024-06-24 DIAGNOSIS — I20.0 UNSTABLE ANGINA (HCC): Primary | ICD-10-CM

## 2024-06-24 DIAGNOSIS — I21.4 NSTEMI (NON-ST ELEVATED MYOCARDIAL INFARCTION) (HCC): ICD-10-CM

## 2024-06-24 PROCEDURE — 96375 TX/PRO/DX INJ NEW DRUG ADDON: CPT

## 2024-06-24 PROCEDURE — 84484 ASSAY OF TROPONIN QUANT: CPT

## 2024-06-24 PROCEDURE — 85520 HEPARIN ASSAY: CPT

## 2024-06-24 PROCEDURE — 93005 ELECTROCARDIOGRAM TRACING: CPT | Performed by: EMERGENCY MEDICINE

## 2024-06-24 PROCEDURE — 80053 COMPREHEN METABOLIC PANEL: CPT

## 2024-06-24 PROCEDURE — 85730 THROMBOPLASTIN TIME PARTIAL: CPT

## 2024-06-24 PROCEDURE — 85025 COMPLETE CBC W/AUTO DIFF WBC: CPT

## 2024-06-24 PROCEDURE — 96374 THER/PROPH/DIAG INJ IV PUSH: CPT

## 2024-06-24 PROCEDURE — 99285 EMERGENCY DEPT VISIT HI MDM: CPT

## 2024-06-24 PROCEDURE — 2500000003 HC RX 250 WO HCPCS: Performed by: EMERGENCY MEDICINE

## 2024-06-24 PROCEDURE — 85610 PROTHROMBIN TIME: CPT

## 2024-06-24 PROCEDURE — 6370000000 HC RX 637 (ALT 250 FOR IP): Performed by: EMERGENCY MEDICINE

## 2024-06-24 RX ORDER — HEPARIN SODIUM 1000 [USP'U]/ML
4000 INJECTION, SOLUTION INTRAVENOUS; SUBCUTANEOUS ONCE
Status: COMPLETED | OUTPATIENT
Start: 2024-06-25 | End: 2024-06-25

## 2024-06-24 RX ORDER — HEPARIN SODIUM 1000 [USP'U]/ML
2000 INJECTION, SOLUTION INTRAVENOUS; SUBCUTANEOUS PRN
Status: DISCONTINUED | OUTPATIENT
Start: 2024-06-24 | End: 2024-06-25

## 2024-06-24 RX ORDER — HEPARIN SODIUM 1000 [USP'U]/ML
4000 INJECTION, SOLUTION INTRAVENOUS; SUBCUTANEOUS PRN
Status: DISCONTINUED | OUTPATIENT
Start: 2024-06-24 | End: 2024-06-25

## 2024-06-24 RX ORDER — MORPHINE SULFATE 2 MG/ML
2 INJECTION, SOLUTION INTRAMUSCULAR; INTRAVENOUS ONCE
Status: COMPLETED | OUTPATIENT
Start: 2024-06-24 | End: 2024-06-24

## 2024-06-24 RX ORDER — HEPARIN SODIUM 10000 [USP'U]/100ML
5-30 INJECTION, SOLUTION INTRAVENOUS CONTINUOUS
Status: DISCONTINUED | OUTPATIENT
Start: 2024-06-25 | End: 2024-06-25

## 2024-06-24 RX ADMIN — NITROGLYCERIN 0.5 INCH: 20 OINTMENT TOPICAL at 23:55

## 2024-06-24 RX ADMIN — MORPHINE SULFATE 2 MG: 2 INJECTION, SOLUTION INTRAMUSCULAR; INTRAVENOUS at 23:54

## 2024-06-24 ASSESSMENT — LIFESTYLE VARIABLES
HOW OFTEN DO YOU HAVE A DRINK CONTAINING ALCOHOL: NEVER
HOW MANY STANDARD DRINKS CONTAINING ALCOHOL DO YOU HAVE ON A TYPICAL DAY: PATIENT DOES NOT DRINK

## 2024-06-24 ASSESSMENT — PAIN SCALES - GENERAL
PAINLEVEL_OUTOF10: 8
PAINLEVEL_OUTOF10: 8

## 2024-06-24 ASSESSMENT — PAIN - FUNCTIONAL ASSESSMENT: PAIN_FUNCTIONAL_ASSESSMENT: 0-10

## 2024-06-25 ENCOUNTER — APPOINTMENT (OUTPATIENT)
Dept: GENERAL RADIOLOGY | Age: 79
DRG: 311 | End: 2024-06-25
Payer: MEDICARE

## 2024-06-25 PROBLEM — R79.89 ELEVATED TROPONIN: Status: ACTIVE | Noted: 2024-06-25

## 2024-06-25 LAB
ALBUMIN SERPL-MCNC: 3.5 G/DL (ref 3.2–4.6)
ALBUMIN/GLOB SERPL: 1.2 (ref 1–1.9)
ALP SERPL-CCNC: 64 U/L (ref 40–129)
ALT SERPL-CCNC: 19 U/L (ref 12–65)
ANION GAP SERPL CALC-SCNC: 12 MMOL/L (ref 9–18)
APTT PPP: 24.1 SEC (ref 23.3–37.4)
APTT PPP: 56 SEC (ref 23.3–37.4)
APTT PPP: 64.1 SEC (ref 23.3–37.4)
AST SERPL-CCNC: 24 U/L (ref 15–37)
BASOPHILS # BLD: 0 K/UL (ref 0–0.2)
BASOPHILS NFR BLD: 0 % (ref 0–2)
BILIRUB SERPL-MCNC: 0.4 MG/DL (ref 0–1.2)
BUN SERPL-MCNC: 18 MG/DL (ref 8–23)
CALCIUM SERPL-MCNC: 9.2 MG/DL (ref 8.8–10.2)
CHLORIDE SERPL-SCNC: 100 MMOL/L (ref 98–107)
CO2 SERPL-SCNC: 24 MMOL/L (ref 20–28)
CREAT SERPL-MCNC: 1.04 MG/DL (ref 0.8–1.3)
DIFFERENTIAL METHOD BLD: ABNORMAL
EKG ATRIAL RATE: 114 BPM
EKG DIAGNOSIS: NORMAL
EKG P AXIS: 36 DEGREES
EKG P-R INTERVAL: 115 MS
EKG Q-T INTERVAL: 274 MS
EKG QRS DURATION: 106 MS
EKG QTC CALCULATION (BAZETT): 376 MS
EKG R AXIS: 19 DEGREES
EKG T AXIS: 180 DEGREES
EKG VENTRICULAR RATE: 113 BPM
EOSINOPHIL # BLD: 0.2 K/UL (ref 0–0.8)
EOSINOPHIL NFR BLD: 2 % (ref 0.5–7.8)
ERYTHROCYTE [DISTWIDTH] IN BLOOD BY AUTOMATED COUNT: 13.8 % (ref 11.9–14.6)
GLOBULIN SER CALC-MCNC: 2.9 G/DL (ref 2.3–3.5)
GLUCOSE SERPL-MCNC: 131 MG/DL (ref 70–99)
HCT VFR BLD AUTO: 33.6 % (ref 41.1–50.3)
HGB BLD-MCNC: 11 G/DL (ref 13.6–17.2)
IMM GRANULOCYTES # BLD AUTO: 0.2 K/UL (ref 0–0.5)
IMM GRANULOCYTES NFR BLD AUTO: 2 % (ref 0–5)
INR PPP: 1.2
LYMPHOCYTES # BLD: 0.8 K/UL (ref 0.5–4.6)
LYMPHOCYTES NFR BLD: 8 % (ref 13–44)
MCH RBC QN AUTO: 31.2 PG (ref 26.1–32.9)
MCHC RBC AUTO-ENTMCNC: 32.7 G/DL (ref 31.4–35)
MCV RBC AUTO: 95.2 FL (ref 82–102)
MONOCYTES # BLD: 0.5 K/UL (ref 0.1–1.3)
MONOCYTES NFR BLD: 5 % (ref 4–12)
NEUTS SEG # BLD: 8 K/UL (ref 1.7–8.2)
NEUTS SEG NFR BLD: 83 % (ref 43–78)
NRBC # BLD: 0.03 K/UL (ref 0–0.2)
PLATELET # BLD AUTO: 174 K/UL (ref 150–450)
PLATELET COMMENT: ADEQUATE
PMV BLD AUTO: 9 FL (ref 9.4–12.3)
POTASSIUM SERPL-SCNC: 4.2 MMOL/L (ref 3.5–5.1)
PROT SERPL-MCNC: 6.4 G/DL (ref 6.3–8.2)
PROTHROMBIN TIME: 16 SEC (ref 11.3–14.9)
RBC # BLD AUTO: 3.53 M/UL (ref 4.23–5.6)
RBC MORPH BLD: ABNORMAL
RBC MORPH BLD: ABNORMAL
SODIUM SERPL-SCNC: 136 MMOL/L (ref 136–145)
TROPONIN T SERPL HS-MCNC: 133 NG/L (ref 0–22)
TROPONIN T SERPL HS-MCNC: 334 NG/L (ref 0–22)
TROPONIN T SERPL HS-MCNC: 39 NG/L (ref 0–22)
UFH PPP CHRO-ACNC: >1.1 IU/ML (ref 0.3–0.7)
UFH PPP CHRO-ACNC: >1.1 IU/ML (ref 0.3–0.7)
WBC # BLD AUTO: 9.7 K/UL (ref 4.3–11.1)
WBC MORPH BLD: ABNORMAL

## 2024-06-25 PROCEDURE — 36415 COLL VENOUS BLD VENIPUNCTURE: CPT

## 2024-06-25 PROCEDURE — 84484 ASSAY OF TROPONIN QUANT: CPT

## 2024-06-25 PROCEDURE — 6370000000 HC RX 637 (ALT 250 FOR IP): Performed by: INTERNAL MEDICINE

## 2024-06-25 PROCEDURE — 6360000002 HC RX W HCPCS: Performed by: EMERGENCY MEDICINE

## 2024-06-25 PROCEDURE — 71045 X-RAY EXAM CHEST 1 VIEW: CPT

## 2024-06-25 PROCEDURE — 6370000000 HC RX 637 (ALT 250 FOR IP): Performed by: PHYSICIAN ASSISTANT

## 2024-06-25 PROCEDURE — 96365 THER/PROPH/DIAG IV INF INIT: CPT

## 2024-06-25 PROCEDURE — 85520 HEPARIN ASSAY: CPT

## 2024-06-25 PROCEDURE — 96375 TX/PRO/DX INJ NEW DRUG ADDON: CPT

## 2024-06-25 PROCEDURE — 85730 THROMBOPLASTIN TIME PARTIAL: CPT

## 2024-06-25 PROCEDURE — 2500000003 HC RX 250 WO HCPCS: Performed by: PHYSICIAN ASSISTANT

## 2024-06-25 PROCEDURE — 93010 ELECTROCARDIOGRAM REPORT: CPT | Performed by: INTERNAL MEDICINE

## 2024-06-25 PROCEDURE — 99215 OFFICE O/P EST HI 40 MIN: CPT | Performed by: INTERNAL MEDICINE

## 2024-06-25 PROCEDURE — G0378 HOSPITAL OBSERVATION PER HR: HCPCS

## 2024-06-25 PROCEDURE — 2580000003 HC RX 258: Performed by: PHYSICIAN ASSISTANT

## 2024-06-25 PROCEDURE — 96366 THER/PROPH/DIAG IV INF ADDON: CPT

## 2024-06-25 PROCEDURE — 76937 US GUIDE VASCULAR ACCESS: CPT

## 2024-06-25 PROCEDURE — 96376 TX/PRO/DX INJ SAME DRUG ADON: CPT

## 2024-06-25 PROCEDURE — 2500000003 HC RX 250 WO HCPCS: Performed by: EMERGENCY MEDICINE

## 2024-06-25 RX ORDER — MORPHINE SULFATE 2 MG/ML
2 INJECTION, SOLUTION INTRAMUSCULAR; INTRAVENOUS
Status: DISCONTINUED | OUTPATIENT
Start: 2024-06-25 | End: 2024-06-26 | Stop reason: HOSPADM

## 2024-06-25 RX ORDER — CEPHALEXIN 500 MG/1
500 CAPSULE ORAL EVERY 6 HOURS SCHEDULED
Status: DISCONTINUED | OUTPATIENT
Start: 2024-06-25 | End: 2024-06-26 | Stop reason: HOSPADM

## 2024-06-25 RX ORDER — POTASSIUM CHLORIDE 7.45 MG/ML
10 INJECTION INTRAVENOUS PRN
Status: DISCONTINUED | OUTPATIENT
Start: 2024-06-25 | End: 2024-06-26 | Stop reason: HOSPADM

## 2024-06-25 RX ORDER — ACETAMINOPHEN 325 MG/1
650 TABLET ORAL EVERY 6 HOURS PRN
Status: DISCONTINUED | OUTPATIENT
Start: 2024-06-25 | End: 2024-06-26 | Stop reason: HOSPADM

## 2024-06-25 RX ORDER — OXYCODONE HYDROCHLORIDE 5 MG/1
5 TABLET ORAL EVERY 4 HOURS PRN
Status: DISCONTINUED | OUTPATIENT
Start: 2024-06-25 | End: 2024-06-26 | Stop reason: HOSPADM

## 2024-06-25 RX ORDER — SODIUM CHLORIDE 9 MG/ML
INJECTION, SOLUTION INTRAVENOUS CONTINUOUS
Status: DISCONTINUED | OUTPATIENT
Start: 2024-06-25 | End: 2024-06-26 | Stop reason: HOSPADM

## 2024-06-25 RX ORDER — POLYETHYLENE GLYCOL 3350 17 G/17G
17 POWDER, FOR SOLUTION ORAL DAILY PRN
Status: DISCONTINUED | OUTPATIENT
Start: 2024-06-25 | End: 2024-06-26 | Stop reason: HOSPADM

## 2024-06-25 RX ORDER — MAGNESIUM SULFATE IN WATER 40 MG/ML
2000 INJECTION, SOLUTION INTRAVENOUS PRN
Status: DISCONTINUED | OUTPATIENT
Start: 2024-06-25 | End: 2024-06-26 | Stop reason: HOSPADM

## 2024-06-25 RX ORDER — SODIUM CHLORIDE 9 MG/ML
INJECTION, SOLUTION INTRAVENOUS PRN
Status: DISCONTINUED | OUTPATIENT
Start: 2024-06-25 | End: 2024-06-26 | Stop reason: HOSPADM

## 2024-06-25 RX ORDER — FLUTICASONE PROPIONATE 50 MCG
2 SPRAY, SUSPENSION (ML) NASAL DAILY
Status: DISCONTINUED | OUTPATIENT
Start: 2024-06-25 | End: 2024-06-26 | Stop reason: HOSPADM

## 2024-06-25 RX ORDER — CEPHALEXIN 500 MG/1
500 CAPSULE ORAL 4 TIMES DAILY
Status: DISCONTINUED | OUTPATIENT
Start: 2024-06-25 | End: 2024-06-25 | Stop reason: ALTCHOICE

## 2024-06-25 RX ORDER — SODIUM CHLORIDE 0.9 % (FLUSH) 0.9 %
5-40 SYRINGE (ML) INJECTION EVERY 12 HOURS SCHEDULED
Status: DISCONTINUED | OUTPATIENT
Start: 2024-06-25 | End: 2024-06-26 | Stop reason: HOSPADM

## 2024-06-25 RX ORDER — ONDANSETRON 2 MG/ML
4 INJECTION INTRAMUSCULAR; INTRAVENOUS EVERY 6 HOURS PRN
Status: DISCONTINUED | OUTPATIENT
Start: 2024-06-25 | End: 2024-06-26 | Stop reason: HOSPADM

## 2024-06-25 RX ORDER — LINEZOLID 600 MG/1
600 TABLET, FILM COATED ORAL 2 TIMES DAILY
Status: CANCELLED | OUTPATIENT
Start: 2024-06-25

## 2024-06-25 RX ORDER — RANOLAZINE 500 MG/1
500 TABLET, EXTENDED RELEASE ORAL 2 TIMES DAILY
Status: DISCONTINUED | OUTPATIENT
Start: 2024-06-25 | End: 2024-06-26 | Stop reason: HOSPADM

## 2024-06-25 RX ORDER — ASPIRIN 81 MG/1
81 TABLET, CHEWABLE ORAL DAILY
Status: DISCONTINUED | OUTPATIENT
Start: 2024-06-25 | End: 2024-06-26 | Stop reason: HOSPADM

## 2024-06-25 RX ORDER — SODIUM CHLORIDE 0.9 % (FLUSH) 0.9 %
5-40 SYRINGE (ML) INJECTION PRN
Status: DISCONTINUED | OUTPATIENT
Start: 2024-06-25 | End: 2024-06-26 | Stop reason: HOSPADM

## 2024-06-25 RX ORDER — POTASSIUM CHLORIDE 20 MEQ/1
40 TABLET, EXTENDED RELEASE ORAL PRN
Status: DISCONTINUED | OUTPATIENT
Start: 2024-06-25 | End: 2024-06-26 | Stop reason: HOSPADM

## 2024-06-25 RX ORDER — ONDANSETRON 4 MG/1
4 TABLET, ORALLY DISINTEGRATING ORAL EVERY 8 HOURS PRN
Status: DISCONTINUED | OUTPATIENT
Start: 2024-06-25 | End: 2024-06-26 | Stop reason: HOSPADM

## 2024-06-25 RX ORDER — NITROGLYCERIN 0.4 MG/1
0.4 TABLET SUBLINGUAL EVERY 5 MIN PRN
Status: DISCONTINUED | OUTPATIENT
Start: 2024-06-25 | End: 2024-06-26 | Stop reason: HOSPADM

## 2024-06-25 RX ORDER — METOPROLOL TARTRATE 1 MG/ML
5 INJECTION, SOLUTION INTRAVENOUS
Status: COMPLETED | OUTPATIENT
Start: 2024-06-25 | End: 2024-06-25

## 2024-06-25 RX ADMIN — METOPROLOL TARTRATE 25 MG: 25 TABLET, FILM COATED ORAL at 02:42

## 2024-06-25 RX ADMIN — SODIUM CHLORIDE, PRESERVATIVE FREE 10 ML: 5 INJECTION INTRAVENOUS at 20:06

## 2024-06-25 RX ADMIN — OXYCODONE 5 MG: 5 TABLET ORAL at 10:53

## 2024-06-25 RX ADMIN — RANOLAZINE 500 MG: 500 TABLET, FILM COATED, EXTENDED RELEASE ORAL at 20:06

## 2024-06-25 RX ADMIN — HEPARIN SODIUM 10 UNITS/KG/HR: 10000 INJECTION, SOLUTION INTRAVENOUS at 01:07

## 2024-06-25 RX ADMIN — ASPIRIN 81 MG 81 MG: 81 TABLET ORAL at 10:53

## 2024-06-25 RX ADMIN — SODIUM CHLORIDE: 9 INJECTION, SOLUTION INTRAVENOUS at 20:10

## 2024-06-25 RX ADMIN — HEPARIN SODIUM 4000 UNITS: 1000 INJECTION INTRAVENOUS; SUBCUTANEOUS at 01:01

## 2024-06-25 RX ADMIN — SODIUM CHLORIDE, PRESERVATIVE FREE 10 ML: 5 INJECTION INTRAVENOUS at 08:44

## 2024-06-25 RX ADMIN — METOPROLOL TARTRATE 25 MG: 25 TABLET, FILM COATED ORAL at 20:05

## 2024-06-25 RX ADMIN — NITROGLYCERIN 1 INCH: 20 OINTMENT TOPICAL at 05:31

## 2024-06-25 RX ADMIN — METOPROLOL TARTRATE 5 MG: 5 INJECTION INTRAVENOUS at 01:01

## 2024-06-25 RX ADMIN — MORPHINE SULFATE 2 MG: 2 INJECTION, SOLUTION INTRAMUSCULAR; INTRAVENOUS at 05:30

## 2024-06-25 RX ADMIN — OXYCODONE 5 MG: 5 TABLET ORAL at 20:09

## 2024-06-25 RX ADMIN — FLUTICASONE PROPIONATE 2 SPRAY: 50 SPRAY, METERED NASAL at 08:43

## 2024-06-25 RX ADMIN — METOPROLOL TARTRATE 25 MG: 25 TABLET, FILM COATED ORAL at 08:43

## 2024-06-25 RX ADMIN — RANOLAZINE 500 MG: 500 TABLET, FILM COATED, EXTENDED RELEASE ORAL at 10:53

## 2024-06-25 RX ADMIN — CEPHALEXIN 500 MG: 500 CAPSULE ORAL at 21:49

## 2024-06-25 RX ADMIN — SODIUM CHLORIDE: 9 INJECTION, SOLUTION INTRAVENOUS at 02:40

## 2024-06-25 ASSESSMENT — PAIN DESCRIPTION - ORIENTATION
ORIENTATION: RIGHT;LEFT
ORIENTATION: MID;POSTERIOR
ORIENTATION: LEFT;MID;POSTERIOR
ORIENTATION: LEFT;RIGHT
ORIENTATION: MID

## 2024-06-25 ASSESSMENT — PAIN SCALES - GENERAL
PAINLEVEL_OUTOF10: 2
PAINLEVEL_OUTOF10: 0
PAINLEVEL_OUTOF10: 3
PAINLEVEL_OUTOF10: 2
PAINLEVEL_OUTOF10: 10
PAINLEVEL_OUTOF10: 0
PAINLEVEL_OUTOF10: 6
PAINLEVEL_OUTOF10: 6
PAINLEVEL_OUTOF10: 7
PAINLEVEL_OUTOF10: 0

## 2024-06-25 ASSESSMENT — PAIN - FUNCTIONAL ASSESSMENT
PAIN_FUNCTIONAL_ASSESSMENT: PREVENTS OR INTERFERES SOME ACTIVE ACTIVITIES AND ADLS

## 2024-06-25 ASSESSMENT — PAIN DESCRIPTION - DESCRIPTORS
DESCRIPTORS: ACHING;DISCOMFORT
DESCRIPTORS: ACHING
DESCRIPTORS: ACHING

## 2024-06-25 ASSESSMENT — PAIN DESCRIPTION - LOCATION
LOCATION: CHEST
LOCATION: CHEST
LOCATION: BACK;SACRUM;HIP
LOCATION: SACRUM
LOCATION: CHEST;FOOT
LOCATION: LEG;BACK
LOCATION: SACRUM

## 2024-06-25 ASSESSMENT — PAIN DESCRIPTION - ONSET: ONSET: GRADUAL

## 2024-06-25 ASSESSMENT — PAIN DESCRIPTION - PAIN TYPE
TYPE: CHRONIC PAIN
TYPE: CHRONIC PAIN

## 2024-06-25 ASSESSMENT — PAIN DESCRIPTION - FREQUENCY: FREQUENCY: INTERMITTENT

## 2024-06-25 NOTE — ED PROVIDER NOTES
Emergency Department Provider Note       PCP: Reynold Yip MD   Age: 79 y.o.   Sex: male     DISPOSITION Admitted 06/25/2024 01:08:39 AM       ICD-10-CM    1. Unstable angina (HCC)  I20.0       2. NSTEMI (non-ST elevated myocardial infarction) (HCC)  I21.4           Medical Decision Making     Patient had a significantly abnormal EKG with diffuse ST depressions and some elevation in lead III.  He did not technically meet STEMI criteria I did speak with the on-call cardiologist Dr. Rodriguez and we discussed the case for medical management I also called the on-call STEMI doctor and discussed case.  Patient's case is very complicated and at this point we we will do medical management I have given him Nitropaste, Lopressor, heparin bolus and continuous heparin drip.  Cardiology has seen patient in the emergency department and are admitting him to the hospital.  I discussed care with patient's family as well.      1 or more acute illnesses that pose a threat to life or bodily function.   Drug therapy given requiring intensive monitoring for toxicity.  Discussion with external consultants.  Chronic medical problems impacting care include end-stage coronary artery disease and vasculopath diffusely.  Shared medical decision making was utilized in creating the patients health plan today.    I independently ordered and reviewed each unique test.  I reviewed external records: provider visit note from outside specialist.  I reviewed external records: previous lab results from outside ED.  I reviewed external records: previous imaging study including radiologist interpretation.   The patients assessment required an independent historian: Family.  The reason they were needed is important historical information not provided by the patient.  I independently interpreted the cardiac monitor rhythm strip sinus.  My Independent EKG Interpretation: sinus rhythm, no evidence of arrhythmia      ST Segments:Normal ST segments - NO  mediastinal contour.    Bones/joints:  There are median sternotomy wires present.  No acute  fracture.      Impression    There is minimal left lower lobe consolidation which could be due to  atelectasis versus pneumonia.    Report signed on 06/25/2024 (01:06 Eastern Time)  Signed by: Wyatt Jean M.D.  Reading Location: 396   CBC with Auto Differential   Result Value Ref Range    WBC 9.7 4.3 - 11.1 K/uL    RBC 3.53 (L) 4.23 - 5.6 M/uL    Hemoglobin 11.0 (L) 13.6 - 17.2 g/dL    Hematocrit 33.6 (L) 41.1 - 50.3 %    MCV 95.2 82.0 - 102.0 FL    MCH 31.2 26.1 - 32.9 PG    MCHC 32.7 31.4 - 35.0 g/dL    RDW 13.8 11.9 - 14.6 %    Platelets 174 150 - 450 K/uL    MPV 9.0 (L) 9.4 - 12.3 FL    nRBC 0.03 0.0 - 0.2 K/uL    Neutrophils % 83 (H) 43 - 78 %    Lymphocytes % 8 (L) 13 - 44 %    Monocytes % 5 4.0 - 12.0 %    Eosinophils % 2 0.5 - 7.8 %    Basophils % 0 0.0 - 2.0 %    Immature Granulocytes % 2 0.0 - 5.0 %    Neutrophils Absolute 8.0 1.7 - 8.2 K/UL    Lymphocytes Absolute 0.8 0.5 - 4.6 K/UL    Monocytes Absolute 0.5 0.1 - 1.3 K/UL    Eosinophils Absolute 0.2 0.0 - 0.8 K/UL    Basophils Absolute 0.0 0.0 - 0.2 K/UL    Immature Granulocytes Absolute 0.2 0.0 - 0.5 K/UL    RBC Comment SLIGHT  POLYCHROMASIA        RBC Comment SLIGHT  ANISOCYTOSIS        WBC Comment Result Confirmed By Smear      Platelet Comment ADEQUATE      Differential Type AUTOMATED     Troponin now then q90 min for 2 occurances   Result Value Ref Range    Troponin T 39.0 (H) 0 - 22 ng/L   Troponin now then q90 min for 2 occurances   Result Value Ref Range    Troponin T 133.0 (HH) 0 - 22 ng/L   Comprehensive Metabolic Panel w/ Reflex to MG   Result Value Ref Range    Sodium 136 136 - 145 mmol/L    Potassium 4.2 3.5 - 5.1 mmol/L    Chloride 100 98 - 107 mmol/L    CO2 24 20 - 28 mmol/L    Anion Gap 12 9 - 18 mmol/L    Glucose 131 (H) 70 - 99 mg/dL    BUN 18 8 - 23 MG/DL    Creatinine 1.04 0.80 - 1.30 MG/DL    Est, Glom Filt Rate 72 >60 ml/min/1.73m2

## 2024-06-25 NOTE — ED TRIAGE NOTES
Pt brought in by [facility] from home with complaint of substernal cp. Had 5 total nitro and 324 asa pta.

## 2024-06-25 NOTE — TELEPHONE ENCOUNTER
Returned call that pt having CP.  Wife answered and stated pt not available.  She is not aware that he had called, asked her to check pt, she spoke w another family member, I asked if pt available and she said no and that pt not needing assistance and will call back if having problems.     MARQUISE Durand

## 2024-06-25 NOTE — PROGRESS NOTES
4 Eyes Skin Assessment     NAME:  Matias Mueller  YOB: 1945  MEDICAL RECORD NUMBER:  [id number]    The patient is being assessed for  Admission    I agree that at least one RN has performed a thorough Head to Toe Skin Assessment on the patient. ALL assessment sites listed below have been assessed.      Areas assessed by both nurses:    Head, Face, Ears, Shoulders, Back, Chest, Arms, Elbows, Hands, Sacrum. Buttock, Coccyx, Ischium, and Legs. Feet and Heels        Does the Patient have a Wound? Yes wound(s) were present on assessment. LDA wound assessment was Initiated and completed by RN    Sacrum has small purple area with slight excoriation. Allevyn placed. BLE red/jennifer and flaky. 3+ pitting edema. Wound consulted.        Juno Prevention initiated by RN: Yes  Wound Care Orders initiated by RN: Yes    Pressure Injury (Stage 3,4, Unstageable, DTI, NWPT, and Complex wounds) if present, place Wound referral order by RN under : Yes    New Ostomies, if present place, Ostomy referral order under : No     Nurse 1 eSignature: Electronically signed by Kiki Zuleta RN on 6/25/24 at 3:21 AM EDT    **SHARE this note so that the co-signing nurse can place an eSignature**    Nurse 2 eSignature: Electronically signed by CHUCK HERNÁNDEZ RN on 6/25/24 at 6:56 AM EDT

## 2024-06-25 NOTE — PROGRESS NOTES
TRANSFER - IN REPORT:    Verbal report received from JUAN MIGUEL Martinez on Matias Mueller being received from ED for routine progression of care.     Report consisted of patient’s Situation, Background, Assessment and Recommendations(SBAR).     Information from the following report(s) SBAR, ED Summary, and MAR was reviewed. Opportunity for questions and clarification was provided.      Assessment completed upon patient’s arrival to unit and care assumed.     Patient will be received to room 403.

## 2024-06-25 NOTE — PLAN OF CARE
Problem: Discharge Planning  Goal: Discharge to home or other facility with appropriate resources  Outcome: Progressing  Flowsheets (Taken 6/25/2024 0230)  Discharge to home or other facility with appropriate resources:   Identify barriers to discharge with patient and caregiver   Arrange for needed discharge resources and transportation as appropriate   Identify discharge learning needs (meds, wound care, etc)     Problem: Pain  Goal: Verbalizes/displays adequate comfort level or baseline comfort level  Outcome: Progressing  Flowsheets (Taken 6/25/2024 0230)  Verbalizes/displays adequate comfort level or baseline comfort level:   Encourage patient to monitor pain and request assistance   Assess pain using appropriate pain scale   Administer analgesics based on type and severity of pain and evaluate response     Problem: Skin/Tissue Integrity  Goal: Absence of new skin breakdown  Description: 1.  Monitor for areas of redness and/or skin breakdown  2.  Assess vascular access sites hourly  3.  Every 4-6 hours minimum:  Change oxygen saturation probe site  4.  Every 4-6 hours:  If on nasal continuous positive airway pressure, respiratory therapy assess nares and determine need for appliance change or resting period.  Outcome: Progressing     Problem: Safety - Adult  Goal: Free from fall injury  Outcome: Progressing  Flowsheets (Taken 6/25/2024 0219)  Free From Fall Injury: Instruct family/caregiver on patient safety     Problem: ABCDS Injury Assessment  Goal: Absence of physical injury  Outcome: Progressing  Flowsheets (Taken 6/25/2024 0219)  Absence of Physical Injury: Implement safety measures based on patient assessment

## 2024-06-25 NOTE — PROGRESS NOTES
US Guided PIV access    Called for assistance with IV access. Ultrasound was used to find the vein which was compressible and does not have any features of an artery or nerve bundle. Skin was cleaned and disinfected prior to IV puncture. Under real-time ultrasound guidance, peripheral access was obtained in the left brachial using 22 G 2.5\" B Hernandez Deep Access x 1 attempt. Blood return was present and IV flushed without difficulty. IV dressing applied, no immediate complications noted, and patient tolerated the procedure well.

## 2024-06-25 NOTE — PROGRESS NOTES
Pt on heparin gtt running at 10 with PTTs. At shift change, an Xa was drawn instead of a PTT. Previous blood draw was also drawn too early on night shift. Per pharmacy, reorder PTT and wait for result before changing gtt rate.    Upon assessment, pt is a hard stick. Pt requesting US IV.     0953: Vascular access team at bedside. Per Elizabeth BULLARD, blood can be drawn from this IV. Waste 10 cc blood before taking sample and flush 10 cc afterwards. \"ALWAYS MAKE SURE IV IS CLAMPED OR BLOOD WILL CLOT OFF.\"     PTT drawn from IV. [facility] notified of PTT delay. Okay to resume q6 hr lab draw from IV after lab results.     1043: Per MD. No plan for LHC. Heparin gtt discontinued.

## 2024-06-25 NOTE — PROGRESS NOTES
Wife and daughter at bedside tonight. They are concerned about patient's right leg being more swollen/red tonight. They state he has had cellulitis recently and this is exactly how it looks when it starts. Spoke with Dr. Zaragoza on the phone and he says to have Henna NP take a look at it when she gets here and assess whether or not we may need a round of antibiotics.

## 2024-06-25 NOTE — PLAN OF CARE
Problem: Discharge Planning  Goal: Discharge to home or other facility with appropriate resources  6/25/2024 1025 by Carli Thompson RN  Outcome: Progressing  Flowsheets (Taken 6/25/2024 0843)  Discharge to home or other facility with appropriate resources:   Identify barriers to discharge with patient and caregiver   Arrange for needed discharge resources and transportation as appropriate   Identify discharge learning needs (meds, wound care, etc)   Arrange for interpreters to assist at discharge as needed   Refer to discharge planning if patient needs post-hospital services based on physician order or complex needs related to functional status, cognitive ability or social support system  6/25/2024 0315 by Kiki Zuleta RN  Outcome: Progressing  Flowsheets (Taken 6/25/2024 0230)  Discharge to home or other facility with appropriate resources:   Identify barriers to discharge with patient and caregiver   Arrange for needed discharge resources and transportation as appropriate   Identify discharge learning needs (meds, wound care, etc)     Problem: Pain  Goal: Verbalizes/displays adequate comfort level or baseline comfort level  6/25/2024 1025 by Carli Thompson RN  Outcome: Progressing  Flowsheets (Taken 6/25/2024 0843)  Verbalizes/displays adequate comfort level or baseline comfort level:   Encourage patient to monitor pain and request assistance   Assess pain using appropriate pain scale   Administer analgesics based on type and severity of pain and evaluate response   Implement non-pharmacological measures as appropriate and evaluate response   Consider cultural and social influences on pain and pain management   Notify Licensed Independent Practitioner if interventions unsuccessful or patient reports new pain  6/25/2024 0315 by Kiki Zuleta RN  Outcome: Progressing  Flowsheets (Taken 6/25/2024 0230)  Verbalizes/displays adequate comfort level or baseline comfort level:   Encourage patient to monitor

## 2024-06-25 NOTE — CARE COORDINATION
Pt presented to the ED on 6/24/24 with CP. Medical hx includes: HTN, HLD, obesity, abdominal cellulitis, PVD, DVTs, carotid stenosis, stage 4 metastatic prostate cancer (bony mets to pelvis), and CAD. Pts son at bedside. Pt reported he lives with his spouse and adult granddtr (moved here from [place]) in a two-story private residence. Pt and spouse live on the main floor of the house. Family lives nearby, supportive and help as needed. Pt drives. Is on 1L supplemental. Uses a RW for ambulation assistance, denies h/o recent falls. Is currently active with [facility]. We spoke about receiving HH services post discharge, said he wanted to think about it as he and his spouse are fairly active. PCP established. [place] Medicare verified and able to afford home meds. Family will transport home at discharge. Will monitor for potential DHIRAJ needs.       06/25/24 1330   Service Assessment   Patient Orientation Alert and Oriented;Person;Place;Situation;Self   Cognition Alert   History Provided By Patient   Primary Caregiver Self   Accompanied By/Relationship Son   Support Systems Spouse/Significant Other;Children;Family Members;[employer];Friends/Neighbors   PCP Verified by CM Yes   Last Visit to PCP Within last 6 months   Prior Functional Level Assistance with the following:;Mobility   Current Functional Level Assistance with the following:;Mobility   Can patient return to prior living arrangement Yes   Ability to make needs known: Good   Family able to assist with home care needs: Yes   Would you like for me to discuss the discharge plan with any other family members/significant others, and if so, who? Yes  (Family)   Financial Resources Medicare   Community Resources ECF/Home Care   Social/Functional History   Lives With Spouse;Family   Type of Home House   Home Layout Two level;Able to Live on Main level with bedroom/bathroom   Bathroom Toilet Standard   Bathroom Accessibility Accessible   Home Equipment

## 2024-06-25 NOTE — WOUND CARE
Right lower leg with mild erythema patechia and edema states difficult to move with leg pain. May need wrap or treatment for cellulitis depends on goals of care, seeing palliative care outpatient.       Bilateral buttocks with purple blanchable erythema and chapped peeling skin in 4x3cm area each buttock.     Recommend light layer antifungal ointment daily and foam dressing every other day

## 2024-06-25 NOTE — H&P
[facility] CARDIOLOGY History &Physical                 Primary Cardiologist: Dr Boyer    Primary Care Physician: Reynold Yip MD    Admitting Physician: Dr Boyer    Subjective:     Patient is a 79 y.o. male who presents with CP. He has a h/o htn, hld, obesity, abdominal cellulitis, PVD, h/o DVT (eliquis), carotid stenosis (R endarterectomy), stage 4 metastatic prostate cancer (bony mets to pelvis), CAD s/p CABG w STEMI 5/16/23 w [facility] ([place]) w unsuccessful attempt at dilating the L main ostial LAD to diagonal, patent LIMA to LAD, circ and RCA occluded w collaterals. 5/16/24 echo w EF 40-45%, grade 1 DD, RV mild-mod reduced.  Presented to the ER 6/24/24 w CP.     Pt has had SSCP, LBP and hip pain on and off for several weeks, slightly improved w oxycodone, waking him from sleep.  Trying to sleep in a chair.  Tonight his pain was worse and he came tot the ER. He has had gripping LBP, SSCP which does not radiate w palpitations, no nausea or SOB. He has had sweats since Lupron injection.  CP is better w nitro, can be 8-10/10, worse w anxiety.  He is eating and drinking well.      In ER CMP wnl except glucose 131, HS trop 39, EKG ST w rate 116 w diffuse ST depression, /70, temp 99.3. CXR pending. Pt referred to palliative care, DNR. CP now 2/10 in ER w heparin, morphine and nitro paste. No missed doses of eliquis. Abdominal cellulitis is resolved.     Past cardiac eval:  [facility] (5/16/24 @ [place]):  LMCA: 50%.  Ostial LAD 95%.  99% prox to mid LAD.  70% distal LAD.  LCX: 100%.  RCA:  100% mid.  LIMA to LAD patent.      Echo (5/16/24 @ [facility]):  EF 40-45%. Moderate RV dysfunction. No significant valve disease      Past Medical History:   Diagnosis Date    Arthritis     Asymptomatic stenosis of right carotid artery without infarction 7/27/2016    CAD (coronary artery disease) 3/26/10    cabg-3    CAD (coronary artery disease) 2002    x 4    Carotid disease, bilateral (HCC) 3/25/2010    Chest pain     Chronic pain

## 2024-06-26 VITALS
DIASTOLIC BLOOD PRESSURE: 60 MMHG | WEIGHT: 234.8 LBS | SYSTOLIC BLOOD PRESSURE: 104 MMHG | OXYGEN SATURATION: 94 % | RESPIRATION RATE: 18 BRPM | HEART RATE: 92 BPM | TEMPERATURE: 98.4 F | HEIGHT: 67 IN | BODY MASS INDEX: 36.85 KG/M2

## 2024-06-26 PROCEDURE — 6370000000 HC RX 637 (ALT 250 FOR IP): Performed by: PHYSICIAN ASSISTANT

## 2024-06-26 PROCEDURE — 2140000001 HC CVICU INTERMEDIATE R&B

## 2024-06-26 PROCEDURE — 6370000000 HC RX 637 (ALT 250 FOR IP): Performed by: INTERNAL MEDICINE

## 2024-06-26 PROCEDURE — 2580000003 HC RX 258: Performed by: PHYSICIAN ASSISTANT

## 2024-06-26 PROCEDURE — 99238 HOSP IP/OBS DSCHRG MGMT 30/<: CPT | Performed by: INTERNAL MEDICINE

## 2024-06-26 PROCEDURE — G0378 HOSPITAL OBSERVATION PER HR: HCPCS

## 2024-06-26 RX ORDER — METOPROLOL TARTRATE 50 MG/1
50 TABLET, FILM COATED ORAL 2 TIMES DAILY
Status: DISCONTINUED | OUTPATIENT
Start: 2024-06-26 | End: 2024-06-26 | Stop reason: HOSPADM

## 2024-06-26 RX ORDER — METOPROLOL TARTRATE 50 MG/1
50 TABLET, FILM COATED ORAL 2 TIMES DAILY
Qty: 60 TABLET | Refills: 3 | Status: SHIPPED | OUTPATIENT
Start: 2024-06-26

## 2024-06-26 RX ORDER — RANOLAZINE 500 MG/1
500 TABLET, EXTENDED RELEASE ORAL 2 TIMES DAILY
Qty: 60 TABLET | Refills: 3 | Status: SHIPPED | OUTPATIENT
Start: 2024-06-26

## 2024-06-26 RX ORDER — ASPIRIN 81 MG/1
81 TABLET, CHEWABLE ORAL DAILY
Qty: 30 TABLET | Refills: 3 | Status: SHIPPED | OUTPATIENT
Start: 2024-06-27

## 2024-06-26 RX ADMIN — OXYCODONE 5 MG: 5 TABLET ORAL at 14:18

## 2024-06-26 RX ADMIN — SODIUM CHLORIDE, PRESERVATIVE FREE 10 ML: 5 INJECTION INTRAVENOUS at 08:16

## 2024-06-26 RX ADMIN — CEPHALEXIN 500 MG: 500 CAPSULE ORAL at 04:49

## 2024-06-26 RX ADMIN — METOPROLOL TARTRATE 25 MG: 25 TABLET, FILM COATED ORAL at 08:15

## 2024-06-26 RX ADMIN — OXYCODONE 5 MG: 5 TABLET ORAL at 01:14

## 2024-06-26 RX ADMIN — CEPHALEXIN 500 MG: 500 CAPSULE ORAL at 11:55

## 2024-06-26 RX ADMIN — RANOLAZINE 500 MG: 500 TABLET, FILM COATED, EXTENDED RELEASE ORAL at 08:15

## 2024-06-26 RX ADMIN — ASPIRIN 81 MG 81 MG: 81 TABLET ORAL at 08:15

## 2024-06-26 RX ADMIN — ACETAMINOPHEN 650 MG: 325 TABLET ORAL at 11:55

## 2024-06-26 ASSESSMENT — PAIN SCALES - GENERAL
PAINLEVEL_OUTOF10: 0
PAINLEVEL_OUTOF10: 3
PAINLEVEL_OUTOF10: 0
PAINLEVEL_OUTOF10: 4
PAINLEVEL_OUTOF10: 6

## 2024-06-26 ASSESSMENT — PAIN DESCRIPTION - ONSET
ONSET: GRADUAL
ONSET: GRADUAL

## 2024-06-26 ASSESSMENT — PAIN DESCRIPTION - ORIENTATION
ORIENTATION: MID;LOWER;PROXIMAL
ORIENTATION: RIGHT;LEFT
ORIENTATION: MID;LOWER;POSTERIOR

## 2024-06-26 ASSESSMENT — PAIN DESCRIPTION - FREQUENCY
FREQUENCY: INTERMITTENT
FREQUENCY: INTERMITTENT

## 2024-06-26 ASSESSMENT — PAIN - FUNCTIONAL ASSESSMENT
PAIN_FUNCTIONAL_ASSESSMENT: PREVENTS OR INTERFERES SOME ACTIVE ACTIVITIES AND ADLS
PAIN_FUNCTIONAL_ASSESSMENT: PREVENTS OR INTERFERES SOME ACTIVE ACTIVITIES AND ADLS

## 2024-06-26 ASSESSMENT — PAIN DESCRIPTION - LOCATION
LOCATION: BACK;LEG
LOCATION: BACK;SACRUM
LOCATION: BACK;SACRUM

## 2024-06-26 ASSESSMENT — PAIN DESCRIPTION - PAIN TYPE
TYPE: CHRONIC PAIN
TYPE: CHRONIC PAIN

## 2024-06-26 ASSESSMENT — PAIN DESCRIPTION - DESCRIPTORS
DESCRIPTORS: ACHING
DESCRIPTORS: ACHING

## 2024-06-26 NOTE — PROGRESS NOTES
[facility] CARDIOLOGY PROGRESS NOTE           6/26/2024 10:24 AM    Admit Date: 6/24/2024      Subjective:   No recurrent chest pain with blood pressure and heart rate controlled.  Overall poor prognosis.  Patient continues to complain of chronic hip and neuropathy pain.    ROS:  Cardiovascular:  As noted above    Objective:      Vitals:    06/26/24 0114 06/26/24 0338 06/26/24 0504 06/26/24 0746   BP:  (!) 90/47 (!) 99/54 104/60   Pulse:  81 80 92   Resp: 18 16 16 18   Temp:  98.5 °F (36.9 °C) 97.6 °F (36.4 °C) 98.4 °F (36.9 °C)   TempSrc:  Oral  Oral   SpO2:  90% 96% 94%   Weight:   106.5 kg (234 lb 12.8 oz)    Height:           Physical Exam:  General-No Acute Distress  Neck- supple, no JVD  CV- regular rate and rhythm no MRG  Lung- clear bilaterally  Abd- soft, nontender, nondistended  Ext- no edema bilaterally.  Skin- warm and dry      Data Review:   Recent Labs     06/24/24  2328   WBC 9.7   HGB 11.0*   HCT 33.6*   MCV 95.2          Recent Labs     06/24/24  2328      K 4.2      CO2 24   BUN 18   CREATININE 1.04   GLUCOSE 131*   CALCIUM 9.2   BILITOT 0.4   ALKPHOS 64   AST 24   ALT 19   LABGLOM 72   GLOB 2.9       No results for input(s): \"CKTOTAL\", \"CKMB\", \"CKMBINDEX\", \"DDIMER\", \"TROPONINI\" in the last 720 hours.           Assessment/Plan:     Active Hospital Problems    Elevated troponin  Severe multivessel coronary artery disease.  Has patent LIMA to LAD but occluded circumflex and RCA.  When he is in significant pain from arthritis/neuropathy, he becomes tachycardic and hypertensive which results in demand ischemia.  Increase beta-blocker to 50 mg twice daily and added Ranexa.  Hold Imdur given his low blood pressure.  Needs better pain control which she will discuss with his palliative care team.  Limited options for revascularization other than PCI of his left main which primarily leads to a ungrafted diagonal.  This is similar in appearance to catheterization in 2019.   This would not likely change his prognosis or his anginal symptoms.  I have discussed this with the patient and family in detail.        Prostate cancer metastatic to bone (HCC)  Noted.      History of DVT (deep vein thrombosis)  Resume Eliquis at discharge.              Rigo Boyer MD

## 2024-06-26 NOTE — CARE COORDINATION
Discharge order is in. Pt is discharging ome today in stable condition. Pt declined HH at this time. Is currently active with [facility]. No other discharge needs identified by CM. Tx goals met.       06/26/24 1354   Services At/After Discharge   Transition of Care Consult (CM Consult) Discharge Planning   Services At/After Discharge None   [place] Resource Information Provided? No   Mode of Transport at Discharge Other (see comment)  (Family)   Confirm Follow Up Transport Family   Condition of Participation: Discharge Planning   The Patient and/or Patient Representative was provided with a Choice of Provider? Patient   The Patient and/Or Patient Representative agree with the Discharge Plan? Yes   Freedom of Choice list was provided with basic dialogue that supports the patient's individualized plan of care/goals, treatment preferences, and shares the quality data associated with the providers?  Yes

## 2024-06-26 NOTE — PLAN OF CARE
Problem: Discharge Planning  Goal: Discharge to home or other facility with appropriate resources  6/26/2024 0907 by Carli Thompson RN  Outcome: Progressing  Flowsheets (Taken 6/26/2024 0815)  Discharge to home or other facility with appropriate resources:   Identify barriers to discharge with patient and caregiver   Arrange for needed discharge resources and transportation as appropriate   Identify discharge learning needs (meds, wound care, etc)   Arrange for interpreters to assist at discharge as needed   Refer to discharge planning if patient needs post-hospital services based on physician order or complex needs related to functional status, cognitive ability or social support system  6/25/2024 2128 by Kiki Zuleta RN  Outcome: Progressing  Flowsheets (Taken 6/25/2024 2009)  Discharge to home or other facility with appropriate resources:   Identify barriers to discharge with patient and caregiver   Arrange for needed discharge resources and transportation as appropriate   Identify discharge learning needs (meds, wound care, etc)     Problem: Pain  Goal: Verbalizes/displays adequate comfort level or baseline comfort level  6/26/2024 0907 by Carli Thompson RN  Outcome: Progressing  Flowsheets (Taken 6/26/2024 0815)  Verbalizes/displays adequate comfort level or baseline comfort level:   Encourage patient to monitor pain and request assistance   Assess pain using appropriate pain scale   Administer analgesics based on type and severity of pain and evaluate response   Implement non-pharmacological measures as appropriate and evaluate response   Consider cultural and social influences on pain and pain management   Notify Licensed Independent Practitioner if interventions unsuccessful or patient reports new pain  6/25/2024 2128 by Kiki Zuleta RN  Outcome: Progressing  Flowsheets (Taken 6/25/2024 2009)  Verbalizes/displays adequate comfort level or baseline comfort level:   Encourage patient to monitor  pain and request assistance   Assess pain using appropriate pain scale   Administer analgesics based on type and severity of pain and evaluate response     Problem: Skin/Tissue Integrity  Goal: Absence of new skin breakdown  Description: 1.  Monitor for areas of redness and/or skin breakdown  2.  Assess vascular access sites hourly  3.  Every 4-6 hours minimum:  Change oxygen saturation probe site  4.  Every 4-6 hours:  If on nasal continuous positive airway pressure, respiratory therapy assess nares and determine need for appliance change or resting period.  6/26/2024 0907 by Carli Thompson RN  Outcome: Progressing  6/25/2024 2128 by Kiki Zuleta RN  Outcome: Progressing     Problem: Safety - Adult  Goal: Free from fall injury  6/26/2024 0907 by Carli Thompson RN  Outcome: Progressing  Flowsheets (Taken 6/26/2024 0815)  Free From Fall Injury:   Instruct family/caregiver on patient safety   Based on caregiver fall risk screen, instruct family/caregiver to ask for assistance with transferring infant if caregiver noted to have fall risk factors  6/25/2024 2128 by Kiki Zuleta RN  Outcome: Progressing  Flowsheets (Taken 6/25/2024 2009)  Free From Fall Injury: Instruct family/caregiver on patient safety     Problem: ABCDS Injury Assessment  Goal: Absence of physical injury  6/26/2024 0907 by Carli Thompson RN  Outcome: Progressing  6/25/2024 2128 by Kiki Zuleta RN  Outcome: Progressing

## 2024-06-26 NOTE — PLAN OF CARE
Problem: Discharge Planning  Goal: Discharge to home or other facility with appropriate resources  6/25/2024 2128 by Kiki Zuleta RN  Outcome: Progressing  Flowsheets (Taken 6/25/2024 2009)  Discharge to home or other facility with appropriate resources:   Identify barriers to discharge with patient and caregiver   Arrange for needed discharge resources and transportation as appropriate   Identify discharge learning needs (meds, wound care, etc)  6/25/2024 1025 by Carli Thompson RN  Outcome: Progressing  Flowsheets (Taken 6/25/2024 0843)  Discharge to home or other facility with appropriate resources:   Identify barriers to discharge with patient and caregiver   Arrange for needed discharge resources and transportation as appropriate   Identify discharge learning needs (meds, wound care, etc)   Arrange for interpreters to assist at discharge as needed   Refer to discharge planning if patient needs post-hospital services based on physician order or complex needs related to functional status, cognitive ability or social support system     Problem: Pain  Goal: Verbalizes/displays adequate comfort level or baseline comfort level  6/25/2024 2128 by Kiki Zuleta RN  Outcome: Progressing  Flowsheets (Taken 6/25/2024 2009)  Verbalizes/displays adequate comfort level or baseline comfort level:   Encourage patient to monitor pain and request assistance   Assess pain using appropriate pain scale   Administer analgesics based on type and severity of pain and evaluate response  6/25/2024 1025 by Carli Thompson RN  Outcome: Progressing  Flowsheets (Taken 6/25/2024 0843)  Verbalizes/displays adequate comfort level or baseline comfort level:   Encourage patient to monitor pain and request assistance   Assess pain using appropriate pain scale   Administer analgesics based on type and severity of pain and evaluate response   Implement non-pharmacological measures as appropriate and evaluate response   Consider  cultural and social influences on pain and pain management   Notify Licensed Independent Practitioner if interventions unsuccessful or patient reports new pain     Problem: Skin/Tissue Integrity  Goal: Absence of new skin breakdown  Description: 1.  Monitor for areas of redness and/or skin breakdown  2.  Assess vascular access sites hourly  3.  Every 4-6 hours minimum:  Change oxygen saturation probe site  4.  Every 4-6 hours:  If on nasal continuous positive airway pressure, respiratory therapy assess nares and determine need for appliance change or resting period.  6/25/2024 2128 by Kiki Zuleta RN  Outcome: Progressing  6/25/2024 1025 by Carli Thompson RN  Outcome: Progressing     Problem: Safety - Adult  Goal: Free from fall injury  6/25/2024 2128 by Kiki Zuleta RN  Outcome: Progressing  Flowsheets (Taken 6/25/2024 2009)  Free From Fall Injury: Instruct family/caregiver on patient safety  6/25/2024 1025 by Carli Thompson RN  Outcome: Progressing  Flowsheets (Taken 6/25/2024 0843)  Free From Fall Injury:   Instruct family/caregiver on patient safety   Based on caregiver fall risk screen, instruct family/caregiver to ask for assistance with transferring infant if caregiver noted to have fall risk factors     Problem: ABCDS Injury Assessment  Goal: Absence of physical injury  6/25/2024 2128 by Kiki Zuleta RN  Outcome: Progressing  6/25/2024 1025 by Carli Thompson RN  Outcome: Progressing

## 2024-06-26 NOTE — PROGRESS NOTES
Discharge instructions provided to patient. Patient verbalized understanding. No questions at this time.     IV x2 and tele box removed.

## 2024-06-26 NOTE — PROGRESS NOTES
Physician Progress Note      PATIENT:               MARC MARTINEZ  CSN #:                  [id number]  :                       1945  ADMIT DATE:       2024 11:16 PM  DISCH DATE:  RESPONDING  PROVIDER #:        Rigo Felton MD          QUERY TEXT:    Pt admitted with chest pain.   Pt noted to have severe multivessel CAD. If   possible, please document in progress notes and discharge summary if you are   evaluating and/or treating any of the following:    The medical record reflects the following:  Risk Factors: 80yo, CAD  Clinical Indicators: EKG shows diffuse ST depression consistent with his known   severe coronary artery. Troponins 39, 133, 334.  Treatment: Heparin drip.Nitro-bid ointment. ASA  Options provided:  -- Chest pain due to CAD with unstable angina  -- Chest pain due to NSTEMI  -- Chest pain due to CAD  -- Other - I will add my own diagnosis  -- Disagree - Not applicable / Not valid  -- Disagree - Clinically unable to determine / Unknown  -- Refer to Clinical Documentation Reviewer    PROVIDER RESPONSE TEXT:    This patient has chest pain due to CAD    Query created by: OTILIA JACQUES on 2024 12:47 PM      Electronically signed by:  Rigo Felton MD 2024 1:52 PM

## 2024-06-26 NOTE — DISCHARGE SUMMARY
[facility] Cardiology Discharge Summary     Patient ID:  Matias Mueller  [id number]  79 y.o.  1945    Admit date: 6/24/2024    Discharge date:  6/26/2024     Admitting Physician: Rigo Boyer MD     Discharge Physician: Dr. Boyer    Admission Diagnoses: Unstable angina (HCC) [I20.0]  Chest pain [R07.9]    Discharge Diagnoses:   Patient Active Problem List    Diagnosis    Elevated troponin     Prostate cancer metastatic to bone (HCC)    History of DVT (deep vein thrombosis)    Chest pain    Severe obesity (HCC)    BLAIR (obstructive sleep apnea)    Status post coronary artery bypass graft    CRI (chronic renal insufficiency)    Coronary artery disease involving native coronary artery of native heart without angina pectoris    HTN (hypertension)    Hyperlipidemia     Cardiology Procedures this admission:   none  Consults: none    Hospital Course: Patient is a 79-year-old male who presents with a multitude of complaints. He has severe known coronary artery disease with recent catheterization at [facility] which shows basically a patent KOHLER to LAD. His circumflex and RCA are occluded. He has diffuse left main stenosis leading to a small to medium caliber diagonal which is ungrafted. At this time it was attempted angioplasty of the left main was unsuccessful. He was noted to have an EF of 40 to 45%. He has metastatic bone cancer and does not wish to proceed with further treatment with Lupron, history of DVT, hypertension, peripheral neuropathy and chronic hip pain. He states he was in severe low back pain and during this time developed chest discomfort. Chest pain progressed and he presented to the ER. His EKG shows diffuse ST depression consistent with his known severe coronary artery disease. He was tachycardic upon his presentation with a heart rate of 116. Blood pressure 132/70. With blood pressure control his chest pain has resolved. Troponin mildly elevated at 334. He was  cholesterol and low salt diet. The patient is instructed to advance activities as tolerated to the limit of fatigue or shortness of breath. The patient is instructed to avoid all heavy lifting, straining, stooping or squatting for 3-5 days. The patient is instructed to watch the cath site for bleeding/oozing; if seen, the patient is instructed to apply firm pressure with a clean cloth and call [facility] at [phone]. The patient is instructed to watch for signs of infection which include: increasing area of redness, fever/hot to touch or purulent drainage at the catheterization site. The patient is instructed not to soak in a bathtub for 7-10 days, but is cleared to shower. The patient is instructed to call the office or return to the ER for immediate evaluation for any shortness of breath or chest pain not relieved by NTG.    Discharge Exam: Patient has been seen by Dr. Boyer: see his progress note for exam details.    /60   Pulse 92   Temp 98.4 °F (36.9 °C) (Oral)   Resp 18   Ht 1.702 m (5' 7\")   Wt 106.5 kg (234 lb 12.8 oz)   SpO2 94%   BMI 36.77 kg/m²     No results found for this or any previous visit (from the past 24 hour(s)).      Patient Instructions:   Current Discharge Medication List        START taking these medications    Details   ranolazine (RANEXA) 500 MG extended release tablet Take 1 tablet by mouth 2 times daily  Qty: 60 tablet, Refills: 3      aspirin 81 MG chewable tablet Take 1 tablet by mouth daily  Qty: 30 tablet, Refills: 3           CONTINUE these medications which have CHANGED    Details   metoprolol tartrate (LOPRESSOR) 50 MG tablet Take 1 tablet by mouth 2 times daily  Qty: 60 tablet, Refills: 3           CONTINUE these medications which have NOT CHANGED    Details   furosemide (LASIX) 20 MG tablet Take 1 tablet by mouth daily      cefadroxil (DURICEF) 500 MG capsule Take 1 capsule by mouth      linezolid (ZYVOX) 600 MG tablet Take 1 tablet by mouth 2 times daily

## 2024-06-26 NOTE — PLAN OF CARE
Problem: Discharge Planning  Goal: Discharge to home or other facility with appropriate resources  6/26/2024 1352 by Carli Thompson RN  Outcome: Completed  6/26/2024 0907 by Carli Thompson RN  Outcome: Progressing  Flowsheets (Taken 6/26/2024 0815)  Discharge to home or other facility with appropriate resources:   Identify barriers to discharge with patient and caregiver   Arrange for needed discharge resources and transportation as appropriate   Identify discharge learning needs (meds, wound care, etc)   Arrange for interpreters to assist at discharge as needed   Refer to discharge planning if patient needs post-hospital services based on physician order or complex needs related to functional status, cognitive ability or social support system     Problem: Pain  Goal: Verbalizes/displays adequate comfort level or baseline comfort level  6/26/2024 1352 by Carli Thompson RN  Outcome: Completed  6/26/2024 0907 by Carli Thompson RN  Outcome: Progressing  Flowsheets (Taken 6/26/2024 0815)  Verbalizes/displays adequate comfort level or baseline comfort level:   Encourage patient to monitor pain and request assistance   Assess pain using appropriate pain scale   Administer analgesics based on type and severity of pain and evaluate response   Implement non-pharmacological measures as appropriate and evaluate response   Consider cultural and social influences on pain and pain management   Notify Licensed Independent Practitioner if interventions unsuccessful or patient reports new pain     Problem: Skin/Tissue Integrity  Goal: Absence of new skin breakdown  Description: 1.  Monitor for areas of redness and/or skin breakdown  2.  Assess vascular access sites hourly  3.  Every 4-6 hours minimum:  Change oxygen saturation probe site  4.  Every 4-6 hours:  If on nasal continuous positive airway pressure, respiratory therapy assess nares and determine need for appliance change or resting period.  6/26/2024 1352 by Jay

## 2024-07-01 ENCOUNTER — TELEPHONE (OUTPATIENT)
Age: 79
End: 2024-07-01

## 2024-07-10 ENCOUNTER — TELEPHONE (OUTPATIENT)
Age: 79
End: 2024-07-10

## 2024-07-10 NOTE — TELEPHONE ENCOUNTER
Has questions about meds. Said he may try to get pt off Eliquis and wants to know if they can do that as they don't want to get another refill if that is the case.    Looking for an earlier appt and in the afternoon as pt is confused and hard to get him up early in the morning.

## 2024-07-10 NOTE — TELEPHONE ENCOUNTER
Spoke with wife. She states that she wanted to know if patient will be coming off Eliquis. States that son said that per conversation with Dr Boyer in the hospital, son assumed that he would be coming off Elqiuis. If patient is coming off Eliquis, she does not want to reorder from [facility]. Wife states that she will wait until appointment Thursday to discuss. Would like samples if possible incase she has to order from [facility] again. Her last order was 4 weeks ago, has not received. [place] [facility] is willing to reimburse her if patient needs to stop Eliquis. Wife states that patient is doing well, no new complaints. '  This nurse sent message to [facility] to see if they have samples.

## 2024-07-10 NOTE — PROGRESS NOTES
Physician Progress Note      PATIENT:               MARC MARTINEZ  CSN #:                  [id number]  :                       1945  ADMIT DATE:       2024 11:16 PM  DISCH DATE:        2024 3:28 PM  RESPONDING  PROVIDER #:        Deya CAMARILLO          QUERY TEXT:    Patient with known CAD presented with chest pain. His troponins increased from   39 to 133  peaking at 334. His EKG changes are inferolateral and he has an occluded right   coronary artery  and left circumflex. Felt his angina was precipitated by his low back pain,   tachycardia and elevated  blood pressure readings. After study, can the patient's condition be further   specified as:    The medical record reflects the following:  Risk Factors: 79yoM with severe multivessel CAD  Clinical Indicators: Troponins 39, 133, 334. As per documentation on p/n dated   ,\"Severe multivessel coronary artery disease.  Has patent LIMA to LAD but   occluded circumflex and RCA.  When he is in significant pain from   arthritis/neuropathy, he becomes tachycardic and hypertensive which results in   demand ischemia\".  Treatment: ASA. Ranexa  Options provided:  -- Type 2 MI  -- Demand Ischemia only, no MI  -- Other - I will add my own diagnosis  -- Disagree - Not applicable / Not valid  -- Disagree - Clinically unable to determine / Unknown  -- Refer to Clinical Documentation Reviewer    PROVIDER RESPONSE TEXT:    This patient has demand ischemia only, no MI.    Query created by: OTILIA JACQUES on 7/3/2024 7:14 AM      Electronically signed by:  Deya CAMARILLO 7/10/2024 12:45 PM

## 2024-07-18 ENCOUNTER — OFFICE VISIT (OUTPATIENT)
Age: 79
End: 2024-07-18
Payer: MEDICARE

## 2024-07-18 VITALS
HEART RATE: 57 BPM | SYSTOLIC BLOOD PRESSURE: 102 MMHG | WEIGHT: 223.8 LBS | DIASTOLIC BLOOD PRESSURE: 48 MMHG | HEIGHT: 67 IN | BODY MASS INDEX: 35.12 KG/M2

## 2024-07-18 DIAGNOSIS — R60.0 LOCALIZED EDEMA: ICD-10-CM

## 2024-07-18 DIAGNOSIS — Z86.718 HISTORY OF DVT (DEEP VEIN THROMBOSIS): ICD-10-CM

## 2024-07-18 DIAGNOSIS — I25.118 CORONARY ARTERY DISEASE OF NATIVE ARTERY OF NATIVE HEART WITH STABLE ANGINA PECTORIS (HCC): Primary | ICD-10-CM

## 2024-07-18 DIAGNOSIS — I10 PRIMARY HYPERTENSION: ICD-10-CM

## 2024-07-18 DIAGNOSIS — E78.2 MIXED HYPERLIPIDEMIA: ICD-10-CM

## 2024-07-18 PROCEDURE — 1123F ACP DISCUSS/DSCN MKR DOCD: CPT | Performed by: INTERNAL MEDICINE

## 2024-07-18 PROCEDURE — 93000 ELECTROCARDIOGRAM COMPLETE: CPT | Performed by: INTERNAL MEDICINE

## 2024-07-18 PROCEDURE — 1111F DSCHRG MED/CURRENT MED MERGE: CPT | Performed by: INTERNAL MEDICINE

## 2024-07-18 PROCEDURE — G8417 CALC BMI ABV UP PARAM F/U: HCPCS | Performed by: INTERNAL MEDICINE

## 2024-07-18 PROCEDURE — G8427 DOCREV CUR MEDS BY ELIG CLIN: HCPCS | Performed by: INTERNAL MEDICINE

## 2024-07-18 PROCEDURE — 99214 OFFICE O/P EST MOD 30 MIN: CPT | Performed by: INTERNAL MEDICINE

## 2024-07-18 PROCEDURE — 3078F DIAST BP <80 MM HG: CPT | Performed by: INTERNAL MEDICINE

## 2024-07-18 PROCEDURE — 3074F SYST BP LT 130 MM HG: CPT | Performed by: INTERNAL MEDICINE

## 2024-07-18 PROCEDURE — 1036F TOBACCO NON-USER: CPT | Performed by: INTERNAL MEDICINE

## 2024-07-18 RX ORDER — FUROSEMIDE 20 MG/1
20 TABLET ORAL DAILY
Qty: 90 TABLET | Refills: 3 | Status: SHIPPED | OUTPATIENT
Start: 2024-07-18

## 2024-07-18 RX ORDER — ASPIRIN 81 MG/1
81 TABLET, CHEWABLE ORAL DAILY
Qty: 90 TABLET | Refills: 3 | Status: SHIPPED | OUTPATIENT
Start: 2024-07-18

## 2024-07-18 RX ORDER — METOPROLOL TARTRATE 50 MG/1
50 TABLET, FILM COATED ORAL 2 TIMES DAILY
Qty: 180 TABLET | Refills: 3 | Status: SHIPPED | OUTPATIENT
Start: 2024-07-18

## 2024-07-18 RX ORDER — NITROGLYCERIN 0.4 MG/1
0.4 TABLET SUBLINGUAL EVERY 5 MIN PRN
Qty: 25 TABLET | Refills: 3 | Status: SHIPPED | OUTPATIENT
Start: 2024-07-18

## 2024-07-18 RX ORDER — RANOLAZINE 500 MG/1
500 TABLET, EXTENDED RELEASE ORAL 2 TIMES DAILY
Qty: 180 TABLET | Refills: 3 | Status: SHIPPED | OUTPATIENT
Start: 2024-07-18

## 2024-07-18 ASSESSMENT — ENCOUNTER SYMPTOMS
NAUSEA: 1
SHORTNESS OF BREATH: 0

## 2024-07-18 NOTE — PROGRESS NOTES
[facility], [address], [address]  [place] [address]  PHONE: [phone]    Matias Mueller  1945      SUBJECTIVE:   Matias Mueller is a 79 y.o. male seen for a follow up visit regarding the following:     Chief Complaint   Patient presents with    Coronary Artery Disease    Hypertension       HPI:    Patient presents for follow-up.  Recently admitted to the hospital 624 through 626.  Had chest pain.  Review of his catheterization from [facility] showed severe underlying coronary artery disease with basically a patent LIMA to LAD with occluded circumflex and RCA.  There was significant left main stenosis leading to the diagonal which was on grafted but review of previous films show this is a chronic finding.  He has metastatic cancer and stated his wish to proceed with conservative therapy.  He was ultimately discharged home.  Review of records show that he was started on Ranexa 500 mg twice daily and his metoprolol was increased to 50 mg twice daily.  Continued on aspirin 81 mg a day in addition to Eliquis.  His Imdur was stopped.    Since his hospital discharge,  feels weaker.  Feels nauseated today.  Poor sleep due to cellulitis and neuropathy in feet and legs.  Improved angina with Ranexa.  No NTG use.  He does note worsening edema primarily in his left leg.  Apparently was told at some point not to use his lymphedema pump due to his heart condition.  He is also not taking Lasix.      Past Medical History, Past Surgical History, Family history, Social History, and Medications were all reviewed with the patient today and updated as necessary.           Current Outpatient Medications:     Sulfamethoxazole-Trimethoprim (BACTRIM PO), Take by mouth, Disp: , Rfl:     aspirin 81 MG chewable tablet, Take 1 tablet by mouth daily, Disp: 90 tablet, Rfl: 3    metoprolol tartrate (LOPRESSOR) 50 MG tablet, Take 1 tablet by mouth 2 times daily, Disp: 180 tablet, Rfl: 3

## 2024-07-25 PROBLEM — R79.89 ELEVATED TROPONIN: Status: RESOLVED | Noted: 2024-06-25 | Resolved: 2024-07-25

## 2024-10-17 ENCOUNTER — OFFICE VISIT (OUTPATIENT)
Dept: VASCULAR SURGERY | Age: 79
End: 2024-10-17
Payer: MEDICARE

## 2024-10-17 VITALS
DIASTOLIC BLOOD PRESSURE: 63 MMHG | SYSTOLIC BLOOD PRESSURE: 123 MMHG | HEIGHT: 67 IN | BODY MASS INDEX: 35.63 KG/M2 | WEIGHT: 227 LBS | HEART RATE: 54 BPM | OXYGEN SATURATION: 91 %

## 2024-10-17 DIAGNOSIS — I65.21 STENOSIS OF RIGHT CAROTID ARTERY: Primary | ICD-10-CM

## 2024-10-17 PROCEDURE — 1123F ACP DISCUSS/DSCN MKR DOCD: CPT | Performed by: STUDENT IN AN ORGANIZED HEALTH CARE EDUCATION/TRAINING PROGRAM

## 2024-10-17 PROCEDURE — G8484 FLU IMMUNIZE NO ADMIN: HCPCS | Performed by: STUDENT IN AN ORGANIZED HEALTH CARE EDUCATION/TRAINING PROGRAM

## 2024-10-17 PROCEDURE — 1036F TOBACCO NON-USER: CPT | Performed by: STUDENT IN AN ORGANIZED HEALTH CARE EDUCATION/TRAINING PROGRAM

## 2024-10-17 PROCEDURE — 3078F DIAST BP <80 MM HG: CPT | Performed by: STUDENT IN AN ORGANIZED HEALTH CARE EDUCATION/TRAINING PROGRAM

## 2024-10-17 PROCEDURE — 3074F SYST BP LT 130 MM HG: CPT | Performed by: STUDENT IN AN ORGANIZED HEALTH CARE EDUCATION/TRAINING PROGRAM

## 2024-10-17 PROCEDURE — G8417 CALC BMI ABV UP PARAM F/U: HCPCS | Performed by: STUDENT IN AN ORGANIZED HEALTH CARE EDUCATION/TRAINING PROGRAM

## 2024-10-17 PROCEDURE — 99213 OFFICE O/P EST LOW 20 MIN: CPT | Performed by: STUDENT IN AN ORGANIZED HEALTH CARE EDUCATION/TRAINING PROGRAM

## 2024-10-17 PROCEDURE — G8427 DOCREV CUR MEDS BY ELIG CLIN: HCPCS | Performed by: STUDENT IN AN ORGANIZED HEALTH CARE EDUCATION/TRAINING PROGRAM

## 2024-10-17 NOTE — PROGRESS NOTES
VASCULAR SURGERY   [address] [address]  [phone] FAX: [phone]        Matias Mueller  : 1945    Reason for visit: Asymptomatic left carotid stenosis     Chief Complaint: 79 y.o. male presents with asymptomatic left carotid stenosis. Denies slurred speech, unilateral weakness and mono-occular vision loss. Compliant with ASA and Eliquis. Now on Repatha. Song right CEA .    Plan:   Asymptomatic left carotid stenosis: Would recommend continued medical management ASA and Repatha. Repeat carotid DUS in 1 year.     Level 3    Imaging interrupted:   Carotid DUS    Mild (<50%) stenosis in the right internal carotid artery.  Heterogeneous plaque in the right internal carotid artery.    Moderate (50-69%) stenosis in the left internal carotid artery.  Heterogeneous plaque in the left internal carotid artery.    Normal antegrade flow involving the right vertebral artery.    Normal antegrade flow involving the left vertebral artery.    Physical Examination:   Vitals:    10/17/24 1336   BP: 123/63   Pulse:    SpO2:            General: No acute distress  HENT: AT  CV: Regular rhythm.    LUNG: No respiratory distress on RA  Abdominal: No distension.  Extremities: 3+ edema LLE 2+ edema RLE.  Vascular:   Radial:  L    2+     R    2+        Past Medical History:   Diagnosis Date    Arthritis     Asymptomatic stenosis of right carotid artery without infarction 2016    CAD (coronary artery disease) 3/26/10    cabg-3    CAD (coronary artery disease) 2002    x 4    Carotid disease, bilateral (HCC) 3/25/2010    Chest pain     Chronic pain     rotator cuff pain- bilat    GERD (gastroesophageal reflux disease)     rare    H/O seasonal allergies     Heart failure (HCC)     History of left-sided carotid endarterectomy 2016 Dr Zuleta    HTN (hypertension) 3/25/2010    Hyperlipidemia 3/25/2010    Hypertension     pt denies- does not take meds    NSTEMI

## 2024-10-29 ENCOUNTER — HOSPITAL ENCOUNTER (INPATIENT)
Age: 79
LOS: 9 days | Discharge: NURSING, REHAB OR LONG-TERM CARE | DRG: 871 | End: 2024-11-07
Attending: INTERNAL MEDICINE | Admitting: INTERNAL MEDICINE
Payer: MEDICARE

## 2024-10-29 DIAGNOSIS — R07.9 CHEST PAIN, UNSPECIFIED TYPE: Primary | ICD-10-CM

## 2024-10-29 PROBLEM — N18.30 CHRONIC RENAL DISEASE, STAGE 3, MODERATELY DECREASED GLOMERULAR FILTRATION RATE BETWEEN 30-59 ML/MIN/1.73 SQUARE METER (HCC): Status: ACTIVE | Noted: 2024-10-29

## 2024-10-29 PROBLEM — I50.22 CHRONIC SYSTOLIC (CONGESTIVE) HEART FAILURE (HCC): Status: ACTIVE | Noted: 2024-10-29

## 2024-10-29 PROBLEM — I21.4 NSTEMI (NON-ST ELEVATED MYOCARDIAL INFARCTION) (HCC): Status: ACTIVE | Noted: 2024-10-29

## 2024-10-29 PROBLEM — L03.90 CELLULITIS: Status: ACTIVE | Noted: 2024-10-29

## 2024-10-29 PROCEDURE — 2500000003 HC RX 250 WO HCPCS: Performed by: PHYSICIAN ASSISTANT

## 2024-10-29 PROCEDURE — 6370000000 HC RX 637 (ALT 250 FOR IP): Performed by: PHYSICIAN ASSISTANT

## 2024-10-29 PROCEDURE — 2060000000 HC ICU INTERMEDIATE R&B

## 2024-10-29 PROCEDURE — 2140000000 HC CCU INTERMEDIATE R&B

## 2024-10-29 PROCEDURE — 6360000002 HC RX W HCPCS: Performed by: PHYSICIAN ASSISTANT

## 2024-10-29 PROCEDURE — 93005 ELECTROCARDIOGRAM TRACING: CPT | Performed by: PHYSICIAN ASSISTANT

## 2024-10-29 RX ORDER — METOPROLOL TARTRATE 1 MG/ML
5 INJECTION, SOLUTION INTRAVENOUS EVERY 5 MIN PRN
Status: DISCONTINUED | OUTPATIENT
Start: 2024-10-29 | End: 2024-11-07 | Stop reason: HOSPADM

## 2024-10-29 RX ORDER — NITROGLYCERIN 20 MG/100ML
5-100 INJECTION INTRAVENOUS CONTINUOUS
Status: DISCONTINUED | OUTPATIENT
Start: 2024-10-29 | End: 2024-11-07 | Stop reason: HOSPADM

## 2024-10-29 RX ORDER — NITROGLYCERIN 0.4 MG/1
0.4 TABLET SUBLINGUAL EVERY 5 MIN PRN
Status: DISCONTINUED | OUTPATIENT
Start: 2024-10-29 | End: 2024-10-29 | Stop reason: SDUPTHER

## 2024-10-29 RX ORDER — ONDANSETRON 2 MG/ML
4 INJECTION INTRAMUSCULAR; INTRAVENOUS EVERY 6 HOURS PRN
Status: DISCONTINUED | OUTPATIENT
Start: 2024-10-29 | End: 2024-11-07 | Stop reason: HOSPADM

## 2024-10-29 RX ORDER — METOPROLOL TARTRATE 50 MG
50 TABLET ORAL 2 TIMES DAILY
Status: DISCONTINUED | OUTPATIENT
Start: 2024-10-29 | End: 2024-11-02

## 2024-10-29 RX ORDER — DOXYCYCLINE 100 MG/1
100 CAPSULE ORAL EVERY 12 HOURS SCHEDULED
Status: DISCONTINUED | OUTPATIENT
Start: 2024-10-29 | End: 2024-10-30

## 2024-10-29 RX ORDER — HEPARIN SODIUM 1000 [USP'U]/ML
2000 INJECTION, SOLUTION INTRAVENOUS; SUBCUTANEOUS PRN
Status: DISCONTINUED | OUTPATIENT
Start: 2024-10-29 | End: 2024-11-02

## 2024-10-29 RX ORDER — ACETAMINOPHEN 650 MG/1
650 SUPPOSITORY RECTAL EVERY 6 HOURS PRN
Status: DISCONTINUED | OUTPATIENT
Start: 2024-10-29 | End: 2024-11-07 | Stop reason: HOSPADM

## 2024-10-29 RX ORDER — RANOLAZINE 500 MG/1
500 TABLET, EXTENDED RELEASE ORAL 2 TIMES DAILY
Status: DISCONTINUED | OUTPATIENT
Start: 2024-10-29 | End: 2024-10-29

## 2024-10-29 RX ORDER — POLYETHYLENE GLYCOL 3350 17 G/17G
17 POWDER, FOR SOLUTION ORAL DAILY PRN
Status: DISCONTINUED | OUTPATIENT
Start: 2024-10-29 | End: 2024-11-07 | Stop reason: HOSPADM

## 2024-10-29 RX ORDER — HEPARIN SODIUM 10000 [USP'U]/100ML
5-30 INJECTION, SOLUTION INTRAVENOUS CONTINUOUS
Status: DISCONTINUED | OUTPATIENT
Start: 2024-10-29 | End: 2024-10-30 | Stop reason: SDUPTHER

## 2024-10-29 RX ORDER — FLUTICASONE PROPIONATE 50 MCG
2 SPRAY, SUSPENSION (ML) NASAL DAILY PRN
Status: DISCONTINUED | OUTPATIENT
Start: 2024-10-29 | End: 2024-11-07 | Stop reason: HOSPADM

## 2024-10-29 RX ORDER — SODIUM CHLORIDE 0.9 % (FLUSH) 0.9 %
5-40 SYRINGE (ML) INJECTION PRN
Status: DISCONTINUED | OUTPATIENT
Start: 2024-10-29 | End: 2024-11-07 | Stop reason: HOSPADM

## 2024-10-29 RX ORDER — RANOLAZINE 500 MG/1
500 TABLET, EXTENDED RELEASE ORAL 2 TIMES DAILY
Status: DISCONTINUED | OUTPATIENT
Start: 2024-10-29 | End: 2024-10-30

## 2024-10-29 RX ORDER — MORPHINE SULFATE 2 MG/ML
2 INJECTION, SOLUTION INTRAMUSCULAR; INTRAVENOUS
Status: DISCONTINUED | OUTPATIENT
Start: 2024-10-29 | End: 2024-11-07 | Stop reason: HOSPADM

## 2024-10-29 RX ORDER — HEPARIN SODIUM 1000 [USP'U]/ML
4000 INJECTION, SOLUTION INTRAVENOUS; SUBCUTANEOUS ONCE
Status: COMPLETED | OUTPATIENT
Start: 2024-10-29 | End: 2024-10-29

## 2024-10-29 RX ORDER — ONDANSETRON 4 MG/1
4 TABLET, ORALLY DISINTEGRATING ORAL EVERY 8 HOURS PRN
Status: DISCONTINUED | OUTPATIENT
Start: 2024-10-29 | End: 2024-11-07 | Stop reason: HOSPADM

## 2024-10-29 RX ORDER — SODIUM CHLORIDE 0.9 % (FLUSH) 0.9 %
5-40 SYRINGE (ML) INJECTION EVERY 12 HOURS SCHEDULED
Status: DISCONTINUED | OUTPATIENT
Start: 2024-10-29 | End: 2024-11-07 | Stop reason: HOSPADM

## 2024-10-29 RX ORDER — ACETAMINOPHEN 325 MG/1
650 TABLET ORAL EVERY 6 HOURS PRN
Status: DISCONTINUED | OUTPATIENT
Start: 2024-10-29 | End: 2024-11-07 | Stop reason: HOSPADM

## 2024-10-29 RX ORDER — HEPARIN SODIUM 1000 [USP'U]/ML
4000 INJECTION, SOLUTION INTRAVENOUS; SUBCUTANEOUS PRN
Status: DISCONTINUED | OUTPATIENT
Start: 2024-10-29 | End: 2024-11-02

## 2024-10-29 RX ORDER — ASPIRIN 81 MG/1
81 TABLET, CHEWABLE ORAL DAILY
Status: DISCONTINUED | OUTPATIENT
Start: 2024-10-30 | End: 2024-11-07 | Stop reason: HOSPADM

## 2024-10-29 RX ORDER — NITROGLYCERIN 0.4 MG/1
0.4 TABLET SUBLINGUAL EVERY 5 MIN PRN
Status: DISCONTINUED | OUTPATIENT
Start: 2024-10-29 | End: 2024-11-07 | Stop reason: HOSPADM

## 2024-10-29 RX ORDER — MAGNESIUM SULFATE IN WATER 40 MG/ML
2000 INJECTION, SOLUTION INTRAVENOUS PRN
Status: DISCONTINUED | OUTPATIENT
Start: 2024-10-29 | End: 2024-11-07 | Stop reason: HOSPADM

## 2024-10-29 RX ORDER — SODIUM CHLORIDE 9 MG/ML
INJECTION, SOLUTION INTRAVENOUS PRN
Status: DISCONTINUED | OUTPATIENT
Start: 2024-10-29 | End: 2024-11-07 | Stop reason: HOSPADM

## 2024-10-29 RX ADMIN — NITROGLYCERIN 5 MCG/MIN: 20 INJECTION INTRAVENOUS at 23:03

## 2024-10-29 RX ADMIN — HEPARIN SODIUM AND DEXTROSE 9 UNITS/KG/HR: 10000; 5 INJECTION INTRAVENOUS at 23:04

## 2024-10-29 RX ADMIN — HEPARIN SODIUM 4000 UNITS: 1000 INJECTION INTRAVENOUS; SUBCUTANEOUS at 23:12

## 2024-10-29 RX ADMIN — METOROPROLOL TARTRATE 5 MG: 5 INJECTION, SOLUTION INTRAVENOUS at 23:28

## 2024-10-29 RX ADMIN — RANOLAZINE 500 MG: 500 TABLET, FILM COATED, EXTENDED RELEASE ORAL at 23:06

## 2024-10-29 RX ADMIN — NITROGLYCERIN 1 INCH: 20 OINTMENT TOPICAL at 22:40

## 2024-10-29 RX ADMIN — DOXYCYCLINE HYCLATE 100 MG: 100 CAPSULE ORAL at 23:33

## 2024-10-29 RX ADMIN — MORPHINE SULFATE 2 MG: 2 INJECTION, SOLUTION INTRAMUSCULAR; INTRAVENOUS at 23:10

## 2024-10-29 ASSESSMENT — PAIN SCALES - GENERAL
PAINLEVEL_OUTOF10: 5
PAINLEVEL_OUTOF10: 5
PAINLEVEL_OUTOF10: 8
PAINLEVEL_OUTOF10: 5
PAINLEVEL_OUTOF10: 7
PAINLEVEL_OUTOF10: 8

## 2024-10-29 ASSESSMENT — PAIN DESCRIPTION - LOCATION
LOCATION: CHEST

## 2024-10-29 ASSESSMENT — PAIN DESCRIPTION - ONSET: ONSET: ON-GOING

## 2024-10-30 ENCOUNTER — APPOINTMENT (OUTPATIENT)
Dept: NON INVASIVE DIAGNOSTICS | Age: 79
DRG: 871 | End: 2024-10-30
Attending: INTERNAL MEDICINE
Payer: MEDICARE

## 2024-10-30 LAB
ALBUMIN SERPL-MCNC: 2.6 G/DL (ref 3.2–4.6)
ALBUMIN/GLOB SERPL: 0.7 (ref 1–1.9)
ALP SERPL-CCNC: 55 U/L (ref 40–129)
ALT SERPL-CCNC: 33 U/L (ref 8–55)
ANION GAP SERPL CALC-SCNC: 11 MMOL/L (ref 7–16)
ANION GAP SERPL CALC-SCNC: 14 MMOL/L (ref 7–16)
ANION GAP SERPL CALC-SCNC: 15 MMOL/L (ref 7–16)
APTT PPP: 33.1 SEC (ref 23.3–37.4)
APTT PPP: 92.6 SEC (ref 23.3–37.4)
AST SERPL-CCNC: 180 U/L (ref 15–37)
BILIRUB SERPL-MCNC: 0.7 MG/DL (ref 0–1.2)
BUN SERPL-MCNC: 28 MG/DL (ref 8–23)
BUN SERPL-MCNC: 33 MG/DL (ref 8–23)
BUN SERPL-MCNC: 36 MG/DL (ref 8–23)
CALCIUM SERPL-MCNC: 8.5 MG/DL (ref 8.8–10.2)
CALCIUM SERPL-MCNC: 8.5 MG/DL (ref 8.8–10.2)
CALCIUM SERPL-MCNC: 8.7 MG/DL (ref 8.8–10.2)
CHLORIDE SERPL-SCNC: 96 MMOL/L (ref 98–107)
CHLORIDE SERPL-SCNC: 97 MMOL/L (ref 98–107)
CHLORIDE SERPL-SCNC: 97 MMOL/L (ref 98–107)
CO2 SERPL-SCNC: 19 MMOL/L (ref 20–29)
CO2 SERPL-SCNC: 19 MMOL/L (ref 20–29)
CO2 SERPL-SCNC: 22 MMOL/L (ref 20–29)
CREAT SERPL-MCNC: 1.57 MG/DL (ref 0.8–1.3)
CREAT SERPL-MCNC: 1.77 MG/DL (ref 0.8–1.3)
CREAT SERPL-MCNC: 1.78 MG/DL (ref 0.8–1.3)
ECHO AO ASC DIAM: 3.5 CM
ECHO AO ASCENDING AORTA INDEX: 1.61 CM/M2
ECHO AO ROOT DIAM: 3.5 CM
ECHO AO ROOT INDEX: 1.61 CM/M2
ECHO AV AREA PEAK VELOCITY: 1.8 CM2
ECHO AV AREA VTI: 1.8 CM2
ECHO AV AREA/BSA PEAK VELOCITY: 0.8 CM2/M2
ECHO AV AREA/BSA VTI: 0.8 CM2/M2
ECHO AV MEAN GRADIENT: 2 MMHG
ECHO AV MEAN VELOCITY: 0.7 M/S
ECHO AV PEAK GRADIENT: 4 MMHG
ECHO AV PEAK VELOCITY: 1 M/S
ECHO AV VELOCITY RATIO: 0.6
ECHO AV VTI: 15.9 CM
ECHO BSA: 2.24 M2
ECHO EST RA PRESSURE: 15 MMHG
ECHO IVC PROX: 2.2 CM
ECHO LA AREA 2C: 19.8 CM2
ECHO LA AREA 4C: 20.7 CM2
ECHO LA DIAMETER INDEX: 2.21 CM/M2
ECHO LA DIAMETER: 4.8 CM
ECHO LA MAJOR AXIS: 5.8 CM
ECHO LA MINOR AXIS: 5.3 CM
ECHO LA TO AORTIC ROOT RATIO: 1.37
ECHO LA VOL BP: 63 ML (ref 18–58)
ECHO LA VOL MOD A2C: 61 ML (ref 18–58)
ECHO LA VOL MOD A4C: 60 ML (ref 18–58)
ECHO LA VOL/BSA BIPLANE: 29 ML/M2 (ref 16–34)
ECHO LA VOLUME INDEX MOD A2C: 28 ML/M2 (ref 16–34)
ECHO LA VOLUME INDEX MOD A4C: 28 ML/M2 (ref 16–34)
ECHO LV E' LATERAL VELOCITY: 6.1 CM/S
ECHO LV E' SEPTAL VELOCITY: 5.8 CM/S
ECHO LV EDV A2C: 243 ML
ECHO LV EDV A4C: 186 ML
ECHO LV EDV INDEX A4C: 86 ML/M2
ECHO LV EDV NDEX A2C: 112 ML/M2
ECHO LV EJECTION FRACTION A2C: 30 %
ECHO LV EJECTION FRACTION A4C: 31 %
ECHO LV EJECTION FRACTION BIPLANE: 29 % (ref 55–100)
ECHO LV ESV A2C: 170 ML
ECHO LV ESV A4C: 129 ML
ECHO LV ESV INDEX A2C: 78 ML/M2
ECHO LV ESV INDEX A4C: 59 ML/M2
ECHO LV FRACTIONAL SHORTENING: 12 % (ref 28–44)
ECHO LV INTERNAL DIMENSION DIASTOLE INDEX: 2.67 CM/M2
ECHO LV INTERNAL DIMENSION DIASTOLIC: 5.8 CM (ref 4.2–5.9)
ECHO LV INTERNAL DIMENSION SYSTOLIC INDEX: 2.35 CM/M2
ECHO LV INTERNAL DIMENSION SYSTOLIC: 5.1 CM
ECHO LV IVSD: 0.9 CM (ref 0.6–1)
ECHO LV MASS 2D: 189.3 G (ref 88–224)
ECHO LV MASS INDEX 2D: 87.2 G/M2 (ref 49–115)
ECHO LV POSTERIOR WALL DIASTOLIC: 0.8 CM (ref 0.6–1)
ECHO LV RELATIVE WALL THICKNESS RATIO: 0.28
ECHO LVOT AREA: 3.1 CM2
ECHO LVOT AV VTI INDEX: 0.58
ECHO LVOT DIAM: 2 CM
ECHO LVOT MEAN GRADIENT: 1 MMHG
ECHO LVOT PEAK GRADIENT: 1 MMHG
ECHO LVOT PEAK VELOCITY: 0.6 M/S
ECHO LVOT STROKE VOLUME INDEX: 13.3 ML/M2
ECHO LVOT SV: 28.9 ML
ECHO LVOT VTI: 9.2 CM
ECHO MV AREA VTI: 1.4 CM2
ECHO MV LVOT VTI INDEX: 2.2
ECHO MV MAX VELOCITY: 1.2 M/S
ECHO MV MEAN GRADIENT: 3 MMHG
ECHO MV MEAN VELOCITY: 0.7 M/S
ECHO MV PEAK GRADIENT: 6 MMHG
ECHO MV REGURGITANT PEAK GRADIENT: 49 MMHG
ECHO MV REGURGITANT PEAK VELOCITY: 3.5 M/S
ECHO MV REGURGITANT VTIA: 85.8 CM
ECHO MV VTI: 20.2 CM
ECHO PV ACCELERATION TIME (AT): 119 MS
ECHO PV MAX VELOCITY: 0.6 M/S
ECHO PV PEAK GRADIENT: 1 MMHG
ECHO RIGHT VENTRICULAR SYSTOLIC PRESSURE (RVSP): 43 MMHG
ECHO RV FREE WALL PEAK S': 5.9 CM/S
ECHO RV INTERNAL DIMENSION: 3.3 CM
ECHO TV REGURGITANT MAX VELOCITY: 2.65 M/S
ECHO TV REGURGITANT PEAK GRADIENT: 28 MMHG
EKG ATRIAL RATE: 118 BPM
EKG ATRIAL RATE: 120 BPM
EKG ATRIAL RATE: 93 BPM
EKG DIAGNOSIS: NORMAL
EKG P AXIS: 51 DEGREES
EKG P AXIS: 58 DEGREES
EKG P AXIS: 62 DEGREES
EKG P-R INTERVAL: 154 MS
EKG P-R INTERVAL: 160 MS
EKG P-R INTERVAL: 188 MS
EKG Q-T INTERVAL: 320 MS
EKG Q-T INTERVAL: 326 MS
EKG Q-T INTERVAL: 394 MS
EKG QRS DURATION: 82 MS
EKG QRS DURATION: 88 MS
EKG QRS DURATION: 90 MS
EKG QTC CALCULATION (BAZETT): 452 MS
EKG QTC CALCULATION (BAZETT): 456 MS
EKG QTC CALCULATION (BAZETT): 489 MS
EKG R AXIS: 51 DEGREES
EKG R AXIS: 62 DEGREES
EKG R AXIS: 62 DEGREES
EKG T AXIS: 147 DEGREES
EKG T AXIS: 183 DEGREES
EKG T AXIS: 183 DEGREES
EKG VENTRICULAR RATE: 118 BPM
EKG VENTRICULAR RATE: 120 BPM
EKG VENTRICULAR RATE: 93 BPM
ERYTHROCYTE [DISTWIDTH] IN BLOOD BY AUTOMATED COUNT: 13.9 % (ref 11.9–14.6)
GLOBULIN SER CALC-MCNC: 3.8 G/DL (ref 2.3–3.5)
GLUCOSE SERPL-MCNC: 123 MG/DL (ref 70–99)
GLUCOSE SERPL-MCNC: 133 MG/DL (ref 70–99)
GLUCOSE SERPL-MCNC: 222 MG/DL (ref 70–99)
HCT VFR BLD AUTO: 38.3 % (ref 41.1–50.3)
HGB BLD-MCNC: 13.1 G/DL (ref 13.6–17.2)
INR PPP: 2
LACTATE SERPL-SCNC: 1.5 MMOL/L (ref 0.5–2)
LACTATE SERPL-SCNC: 2.5 MMOL/L (ref 0.5–2)
LACTATE SERPL-SCNC: 3.4 MMOL/L (ref 0.5–2)
MAGNESIUM SERPL-MCNC: 1.9 MG/DL (ref 1.8–2.4)
MCH RBC QN AUTO: 32.5 PG (ref 26.1–32.9)
MCHC RBC AUTO-ENTMCNC: 34.2 G/DL (ref 31.4–35)
MCV RBC AUTO: 95 FL (ref 82–102)
NRBC # BLD: 0 K/UL (ref 0–0.2)
PLATELET # BLD AUTO: 178 K/UL (ref 150–450)
PMV BLD AUTO: 8.9 FL (ref 9.4–12.3)
POTASSIUM SERPL-SCNC: 4.7 MMOL/L (ref 3.5–5.1)
POTASSIUM SERPL-SCNC: 4.8 MMOL/L (ref 3.5–5.1)
POTASSIUM SERPL-SCNC: 5.3 MMOL/L (ref 3.5–5.1)
PROCALCITONIN SERPL-MCNC: 3.04 NG/ML (ref 0–0.1)
PROCALCITONIN SERPL-MCNC: 3.24 NG/ML (ref 0–0.1)
PROT SERPL-MCNC: 6.4 G/DL (ref 6.3–8.2)
PROTHROMBIN TIME: 24 SEC (ref 11.3–14.9)
RBC # BLD AUTO: 4.03 M/UL (ref 4.23–5.6)
SODIUM SERPL-SCNC: 130 MMOL/L (ref 136–145)
TROPONIN T SERPL HS-MCNC: 1300 NG/L (ref 0–22)
TROPONIN T SERPL HS-MCNC: 1538 NG/L (ref 0–22)
TROPONIN T SERPL HS-MCNC: 459 NG/L (ref 0–22)
UFH PPP CHRO-ACNC: >1.1 IU/ML (ref 0.3–0.7)
UFH PPP CHRO-ACNC: >1.1 IU/ML (ref 0.3–0.7)
WBC # BLD AUTO: 15.3 K/UL (ref 4.3–11.1)

## 2024-10-30 PROCEDURE — 2700000000 HC OXYGEN THERAPY PER DAY

## 2024-10-30 PROCEDURE — 93010 ELECTROCARDIOGRAM REPORT: CPT | Performed by: INTERNAL MEDICINE

## 2024-10-30 PROCEDURE — 6360000004 HC RX CONTRAST MEDICATION: Performed by: INTERNAL MEDICINE

## 2024-10-30 PROCEDURE — 2580000003 HC RX 258: Performed by: INTERNAL MEDICINE

## 2024-10-30 PROCEDURE — 6370000000 HC RX 637 (ALT 250 FOR IP): Performed by: INTERNAL MEDICINE

## 2024-10-30 PROCEDURE — 6370000000 HC RX 637 (ALT 250 FOR IP)

## 2024-10-30 PROCEDURE — C8929 TTE W OR WO FOL WCON,DOPPLER: HCPCS

## 2024-10-30 PROCEDURE — 2500000003 HC RX 250 WO HCPCS: Performed by: NURSE PRACTITIONER

## 2024-10-30 PROCEDURE — 85520 HEPARIN ASSAY: CPT

## 2024-10-30 PROCEDURE — 94761 N-INVAS EAR/PLS OXIMETRY MLT: CPT

## 2024-10-30 PROCEDURE — 84484 ASSAY OF TROPONIN QUANT: CPT

## 2024-10-30 PROCEDURE — 85027 COMPLETE CBC AUTOMATED: CPT

## 2024-10-30 PROCEDURE — 94660 CPAP INITIATION&MGMT: CPT

## 2024-10-30 PROCEDURE — 83735 ASSAY OF MAGNESIUM: CPT

## 2024-10-30 PROCEDURE — 84145 PROCALCITONIN (PCT): CPT

## 2024-10-30 PROCEDURE — 87040 BLOOD CULTURE FOR BACTERIA: CPT

## 2024-10-30 PROCEDURE — 2060000000 HC ICU INTERMEDIATE R&B

## 2024-10-30 PROCEDURE — 83605 ASSAY OF LACTIC ACID: CPT

## 2024-10-30 PROCEDURE — 99232 SBSQ HOSP IP/OBS MODERATE 35: CPT | Performed by: INTERNAL MEDICINE

## 2024-10-30 PROCEDURE — 93306 TTE W/DOPPLER COMPLETE: CPT | Performed by: INTERNAL MEDICINE

## 2024-10-30 PROCEDURE — 85610 PROTHROMBIN TIME: CPT

## 2024-10-30 PROCEDURE — 2580000003 HC RX 258: Performed by: PHYSICIAN ASSISTANT

## 2024-10-30 PROCEDURE — 36415 COLL VENOUS BLD VENIPUNCTURE: CPT

## 2024-10-30 PROCEDURE — 6370000000 HC RX 637 (ALT 250 FOR IP): Performed by: PHYSICIAN ASSISTANT

## 2024-10-30 PROCEDURE — 6360000002 HC RX W HCPCS: Performed by: PHYSICIAN ASSISTANT

## 2024-10-30 PROCEDURE — 51798 US URINE CAPACITY MEASURE: CPT

## 2024-10-30 PROCEDURE — 80048 BASIC METABOLIC PNL TOTAL CA: CPT

## 2024-10-30 PROCEDURE — 2140000000 HC CCU INTERMEDIATE R&B

## 2024-10-30 PROCEDURE — 6360000002 HC RX W HCPCS: Performed by: STUDENT IN AN ORGANIZED HEALTH CARE EDUCATION/TRAINING PROGRAM

## 2024-10-30 PROCEDURE — 2580000003 HC RX 258: Performed by: STUDENT IN AN ORGANIZED HEALTH CARE EDUCATION/TRAINING PROGRAM

## 2024-10-30 PROCEDURE — 93005 ELECTROCARDIOGRAM TRACING: CPT | Performed by: NURSE PRACTITIONER

## 2024-10-30 PROCEDURE — 80053 COMPREHEN METABOLIC PANEL: CPT

## 2024-10-30 PROCEDURE — 85730 THROMBOPLASTIN TIME PARTIAL: CPT

## 2024-10-30 RX ORDER — GUAIFENESIN 200 MG/10ML
200 LIQUID ORAL EVERY 6 HOURS PRN
Status: DISCONTINUED | OUTPATIENT
Start: 2024-10-30 | End: 2024-11-07 | Stop reason: HOSPADM

## 2024-10-30 RX ORDER — LINEZOLID 600 MG/1
600 TABLET, FILM COATED ORAL EVERY 12 HOURS SCHEDULED
Status: COMPLETED | OUTPATIENT
Start: 2024-10-30 | End: 2024-11-04

## 2024-10-30 RX ORDER — GUAIFENESIN 200 MG/10ML
200 LIQUID ORAL EVERY 6 HOURS
Status: DISCONTINUED | OUTPATIENT
Start: 2024-10-30 | End: 2024-10-30

## 2024-10-30 RX ORDER — GUAIFENESIN 200 MG/10ML
400 LIQUID ORAL EVERY 6 HOURS
Status: DISCONTINUED | OUTPATIENT
Start: 2024-10-30 | End: 2024-10-30

## 2024-10-30 RX ORDER — HEPARIN SODIUM 10000 [USP'U]/100ML
5-30 INJECTION, SOLUTION INTRAVENOUS CONTINUOUS
Status: DISCONTINUED | OUTPATIENT
Start: 2024-10-30 | End: 2024-11-02

## 2024-10-30 RX ORDER — SODIUM CHLORIDE, SODIUM LACTATE, POTASSIUM CHLORIDE, AND CALCIUM CHLORIDE .6; .31; .03; .02 G/100ML; G/100ML; G/100ML; G/100ML
1000 INJECTION, SOLUTION INTRAVENOUS ONCE
Status: DISCONTINUED | OUTPATIENT
Start: 2024-10-30 | End: 2024-10-30

## 2024-10-30 RX ORDER — SODIUM CHLORIDE, SODIUM LACTATE, POTASSIUM CHLORIDE, AND CALCIUM CHLORIDE .6; .31; .03; .02 G/100ML; G/100ML; G/100ML; G/100ML
30 INJECTION, SOLUTION INTRAVENOUS ONCE
Status: DISCONTINUED | OUTPATIENT
Start: 2024-10-30 | End: 2024-10-30

## 2024-10-30 RX ORDER — MENTHOL 1.4 %
ADHESIVE PATCH, MEDICATED TOPICAL 3 TIMES DAILY PRN
Status: DISCONTINUED | OUTPATIENT
Start: 2024-10-30 | End: 2024-11-07 | Stop reason: HOSPADM

## 2024-10-30 RX ORDER — RANOLAZINE 500 MG/1
1000 TABLET, EXTENDED RELEASE ORAL 2 TIMES DAILY
Status: DISCONTINUED | OUTPATIENT
Start: 2024-10-30 | End: 2024-11-01

## 2024-10-30 RX ORDER — FUROSEMIDE 10 MG/ML
40 INJECTION INTRAMUSCULAR; INTRAVENOUS ONCE
Status: DISCONTINUED | OUTPATIENT
Start: 2024-10-30 | End: 2024-10-30

## 2024-10-30 RX ORDER — SODIUM CHLORIDE, SODIUM LACTATE, POTASSIUM CHLORIDE, AND CALCIUM CHLORIDE .6; .31; .03; .02 G/100ML; G/100ML; G/100ML; G/100ML
1000 INJECTION, SOLUTION INTRAVENOUS ONCE
Status: COMPLETED | OUTPATIENT
Start: 2024-10-30 | End: 2024-10-30

## 2024-10-30 RX ORDER — LANOLIN ALCOHOL/MO/W.PET/CERES
6 CREAM (GRAM) TOPICAL NIGHTLY PRN
Status: DISCONTINUED | OUTPATIENT
Start: 2024-10-30 | End: 2024-11-07 | Stop reason: HOSPADM

## 2024-10-30 RX ORDER — SODIUM CHLORIDE, SODIUM LACTATE, POTASSIUM CHLORIDE, AND CALCIUM CHLORIDE .6; .31; .03; .02 G/100ML; G/100ML; G/100ML; G/100ML
3000 INJECTION, SOLUTION INTRAVENOUS ONCE
Status: DISCONTINUED | OUTPATIENT
Start: 2024-10-30 | End: 2024-10-30

## 2024-10-30 RX ADMIN — MUSCLE RUB CREAM: 100; 150 CREAM TOPICAL at 01:11

## 2024-10-30 RX ADMIN — RANOLAZINE 500 MG: 500 TABLET, FILM COATED, EXTENDED RELEASE ORAL at 08:15

## 2024-10-30 RX ADMIN — METOPROLOL TARTRATE 50 MG: 50 TABLET, FILM COATED ORAL at 08:15

## 2024-10-30 RX ADMIN — GUAIFENESIN 200 MG: 200 SOLUTION ORAL at 13:51

## 2024-10-30 RX ADMIN — SODIUM CHLORIDE, PRESERVATIVE FREE 0.45 ML: 5 INJECTION INTRAVENOUS at 14:25

## 2024-10-30 RX ADMIN — SODIUM CHLORIDE, PRESERVATIVE FREE 10 ML: 5 INJECTION INTRAVENOUS at 21:11

## 2024-10-30 RX ADMIN — GUAIFENESIN 200 MG: 200 SOLUTION ORAL at 23:14

## 2024-10-30 RX ADMIN — ASPIRIN 81 MG 81 MG: 81 TABLET ORAL at 08:15

## 2024-10-30 RX ADMIN — HEPARIN SODIUM 4000 UNITS: 1000 INJECTION INTRAVENOUS; SUBCUTANEOUS at 21:41

## 2024-10-30 RX ADMIN — LINEZOLID 600 MG: 600 TABLET, FILM COATED ORAL at 21:11

## 2024-10-30 RX ADMIN — WATER 1000 MG: 1 INJECTION INTRAMUSCULAR; INTRAVENOUS; SUBCUTANEOUS at 12:41

## 2024-10-30 RX ADMIN — RANOLAZINE 1000 MG: 500 TABLET, FILM COATED, EXTENDED RELEASE ORAL at 21:10

## 2024-10-30 RX ADMIN — Medication 6 MG: at 23:15

## 2024-10-30 RX ADMIN — DOXYCYCLINE HYCLATE 100 MG: 100 CAPSULE ORAL at 08:15

## 2024-10-30 RX ADMIN — CEFAZOLIN 2000 MG: 2 INJECTION, POWDER, FOR SOLUTION INTRAMUSCULAR; INTRAVENOUS at 21:06

## 2024-10-30 RX ADMIN — SODIUM CHLORIDE, POTASSIUM CHLORIDE, SODIUM LACTATE AND CALCIUM CHLORIDE 1000 ML: 600; 310; 30; 20 INJECTION, SOLUTION INTRAVENOUS at 13:34

## 2024-10-30 RX ADMIN — SODIUM CHLORIDE, PRESERVATIVE FREE 10 ML: 5 INJECTION INTRAVENOUS at 01:13

## 2024-10-30 ASSESSMENT — PAIN DESCRIPTION - LOCATION
LOCATION: CHEST
LOCATION: CHEST
LOCATION: HIP
LOCATION: CHEST

## 2024-10-30 ASSESSMENT — PAIN SCALES - GENERAL
PAINLEVEL_OUTOF10: 0
PAINLEVEL_OUTOF10: 4
PAINLEVEL_OUTOF10: 0
PAINLEVEL_OUTOF10: 4
PAINLEVEL_OUTOF10: 0
PAINLEVEL_OUTOF10: 4
PAINLEVEL_OUTOF10: 4
PAINLEVEL_OUTOF10: 0
PAINLEVEL_OUTOF10: 2
PAINLEVEL_OUTOF10: 5
PAINLEVEL_OUTOF10: 4
PAINLEVEL_OUTOF10: 0
PAINLEVEL_OUTOF10: 3
PAINLEVEL_OUTOF10: 2

## 2024-10-30 ASSESSMENT — PAIN DESCRIPTION - ORIENTATION
ORIENTATION: LEFT
ORIENTATION: LEFT

## 2024-10-30 ASSESSMENT — PAIN DESCRIPTION - DESCRIPTORS: DESCRIPTORS: ACHING

## 2024-10-30 NOTE — ICUWATCH
[facility]RT Clinical Rounding Nurse Update    Vitals:    10/30/24 1045 10/30/24 1055 10/30/24 1200 10/30/24 1535   BP: (!) 71/57 (!) 81/60 93/64 92/70   Pulse: 93 95 (!) 102    Resp:   18 13   Temp:   98.6 °F (37 °C) 97.7 °F (36.5 °C)   TempSrc:   Oral Oral   SpO2: (!) 89% 93% 98%    Weight:       Height:            ASSESSMENT:  Previous outreach assessment was reviewed. There have been no significant changes since previous assessment.  Pt lying in bed, RR a little more labored.  Lung sounds a little more wheezy.  BP 90s/70s, MAP 74 at this time.  Responding well to IVF bolus.  Pt says he has CPAP at home, but has not been wearing for a while.  Discussed with EVELIA Barakat.  Orders placed.  Pt to wear CPAP qhs and prn for inc wob.  RT notified and to place on pt. Primary RN aware of the above.     PLAN:  Will follow per RRT Clinical Rounding Program protocol.    Hilda Colbert RN  [place]: [phone]  [place]: [phone]

## 2024-10-30 NOTE — MANAGEMENT PLAN
Pt with acute hypotension this AM post-BB admin. Home dose BB given this AM as tachy overnight. Afebrile. Ranexa was increased this AM and he is on IV Heparin. He was placed on 2L NC O2 this AM for O2 sat 87%. Pt not c/o CP.    Repeat EKG w/o acute changes from priors.     NTG stopped with hypotension and BP up to 80-90 systolic. Repeat labs sent (BMP, Mag, LA, PCT, Trop).     Repeat labs showed Trop 1300 (was 459), Cr 1.77 (was 1.57), Na+ 130, LA 3/4 (was 1.4), PCT 3.04, K+ 5.3 and Mag 1.9.     He has been seen by the Hospitalist for management of his cellulitis and sepsis. Appears they have started IV Ancef and ordered a LR bolus. ID was consulted for recurrent cellulitis.    At this time -- no acute CV intervention needed.   Sepsis management per Hospitalist  Will ask RN to have ICU Iva follow Pt as he is high risk for decompensation.     ALEKSEY RodriguezC

## 2024-10-30 NOTE — ICUWATCH
RRT Clinical Rounding Nurse Progress Report      SUBJECTIVE: Patient assessed secondary to RN/provider concern - hypotension, concern for sepsis r/t RLE cellulitis--Hospitalist cx and seeing.      Vitals:    10/30/24 1018 10/30/24 1045 10/30/24 1055 10/30/24 1200   BP:  (!) 71/57 (!) 81/60 93/64   Pulse:  93 95 (!) 102   Resp:    18   Temp:    98.6 °F (37 °C)   TempSrc:    Oral   SpO2: 94% (!) 89% 93% 98%   Weight:       Height:            ASSESSMENT:  Pt lying awake in bed, oriented x3, in NAD.  Follows commands.  Lung sounds w/few scattered wheezes.  On 4L NC, O2 Sat 98%, RR mildly labored at rest.  Pt feels breathing is ok at this time.  ST, HR 100s.  BP 80s/60s, MAP 67.  Generalized edema present.  RLE with edema/erythema.  LA 3.4, PCT 3.04. Pt receiving 1L LR bolus at slower rate. ECHO from 5/2024 shows EF 40-45%. Abx started. Pt denies any complaints at this time, just tired. Son at bedside.        PLAN:  Will follow per RRT Clinical Rounding Program protocol.  Discussion had with pt and pt's son at bedside.  Both updated on pt status, labs/VS reviewed and POC in place.  Discussed pt's wishes regarding code status as well if his status were to deteriorate.  Prior provider has prefaced this as well. Pt does not want to be on life support and wishes to be DNR (pt was DNR at home pta also). Son very supportive of pt's wishes. Lucinda, Primary RN present during this discussion. Discussed consideration of going home with hospice at discharge, given pt's current status.  Updated EVELIA Barakat cardiology.  Orders placed by provider.      Hilda Colbert RN  [facility]: [phone]  [facility]: [phone]

## 2024-10-30 NOTE — H&P
rotator cuff pain- bilat    GERD (gastroesophageal reflux disease)     rare    H/O seasonal allergies     Heart failure (HCC)     History of left-sided carotid endarterectomy 2016 Dr Zuleta    HTN (hypertension) 3/25/2010    Hyperlipidemia 3/25/2010    Hypertension     pt denies- does not take meds    NSTEMI (non-ST elevated myocardial infarction) (HCC) 3/7/2016    Obesity 3/15/2016    Unspecified sleep apnea     cpap at night       Past Surgical History:   Procedure Laterality Date    CAROTID ENDARTERECTOMY Right 2017    CAROTID ENDARTERECTOMY Left     ERCP      x 4    HEENT      wisdom teeth ext    [place] UNLISTED PROCEDURE CARDIAC SURGERY      stents, CABG x 3      Allergies   Allergen Reactions    Statins Other (See Comments) and Rash     Joint pain        Social History     Tobacco Use    Smoking status: Former     Current packs/day: 0.00     Types: Cigarettes     Quit date: 1962     Years since quittin.8    Smokeless tobacco: Never    Tobacco comments:     Quit smoking: quit age 20   Substance Use Topics    Alcohol use: No     Alcohol/week: 0.0 standard drinks of alcohol      FH:   Family History   Problem Relation Age of Onset    Cancer Mother     Stroke Father     Heart Disease Father     Diabetes Brother     Diabetes Paternal Grandmother     Cancer Brother     Heart Disease Brother         Review of Systems  General: no weight change, no weakness, fever or chills  Skin: no rashes, lumps, or other skin changes  HEENT: no headache, dizziness, lightheadedness, vision changes, hearing changes, tinnitus, vertigo, sinus pressure/pain, bleeding gums, sore throat, or hoarseness  Neck: no swollen glands, goiter, pain or stiffness  Respiratory: no cough, sputum, hemoptysis, + mild dyspnea, no wheezing  Cardiovascular: + as per HPI  Gastrointestinal: no reflux, constipation, diarrhea, liver problems, GI bleeding  Urinary: + prostate ca  Peripheral Vascular: no claudication, leg

## 2024-10-30 NOTE — CARE COORDINATION
Patient admitted with NSTEMI. Heparin drip. Per notes, complex CAD planned to treat medically.   Cellulitis (R) leg. 2 lpm O2.  CM met with patient and his spouse with other family members present. Patient's spouse verified PCP, demographic, and insurance. Lives with his spouse in a single level home with 1 BEATRIS. Partially dependent for ADLs. Uses a rollator at all times. DME: CPAP, rollator.  [facility] visits monthly. Patient reports he has a prescription for OP PT at [facility] but hasn't been able to start sessions.  Has lymph edema wraps which spouse assists with.   CM will follow status to assist transition of care needs.     10/30/24 1602   Service Assessment   Patient Orientation Alert and Oriented   Cognition Alert   History Provided By Patient;Spouse   Primary Caregiver Self   Accompanied By/Relationship Spouse, DIL, niece   Support Systems Spouse/Significant Other;Family Members;Children   Patient's Healthcare Decision Maker is: Legal Next of Kin   PCP Verified by CM Yes  (Dr Orellana)   Last Visit to PCP Within last 3 months   Prior Functional Level Assistance with the following:;Bathing;Dressing;Cooking;Housework;Shopping   Current Functional Level Cooking;Housework;Shopping;Bathing;Dressing;Assistance with the following:   Can patient return to prior living arrangement Yes   Ability to make needs known: Good   Family able to assist with home care needs: Yes   Would you like for me to discuss the discharge plan with any other family members/significant others, and if so, who? Yes  (Spouse)   Financial Resources Medicare;Other (Comment)  ([facility] supplement)   Community Resources None   CM/SW Referral Other (see comment)   Social/Functional History   Lives With Spouse   Type of Home House   Home Layout One level   Home Access Stairs to enter with rails   Entrance Stairs - Number of Steps 1   Home Equipment Rollator   Receives Help From Family   ADL Assistance Needs assistance   Homemaking Assistance Needs

## 2024-10-30 NOTE — CONSULTS
Infectious Disease Consult      Today's Date: 10/30/2024   Admit Date: 10/29/2024  YOB: 1945    Impression:   RLE cellulitis   Hx of recurrent cellulitis.  Was seen by MTS/Dr. Wright at [facility] May 2024.  Per discharge summary plan was \"5-day course of linezolid to be followed thereafter by lifelong Duricef 500 mg daily.\"  Patient has not been on Duricef.  Patient has had Bactrim as needed for recurrent cellulitis per PCP.    Chronic lymphedema   NSTEMI on heparin gtt.  No acute CV intervention needed per Cardiology  JOANNE on CKD:  can impact medication dosing   CAD s/p CABG, HFrEF  PAD  Hx of DVT on Eliqus   Stage 4 metastatic prostate cancer with bony mets to pelvis     Plan:   Continue Ancef but increase dose to 2 grams Q 8 hours   Start Linezolid   Doxycycline has been discontinued   Follow blood cultures   ID following     Anti-infectives:   Cefazolin 10/30/24-  Doxycycline 10/30/24     Subjective:   Date of Consultation:  October 30, 2024  Date of Admission: 10/29/2024   Referring Provider: Emilia Kwon   Reason for consult: \"recurrent right leg cellulitis. Having sepsis and NSTEMI. A similar episode in May\"     Patient is a 79 y.o. male with multiple medical problems that was admitted 10/29/24 as a transfer from [facility] for NSTEMI.  Along with chest pain and mild shortness of breath, patient reported right left pain with erythema and fever with temp 101.  Patient had been on Bactrim a couple of days PTA for cellulitis.  Patient previously had cellulitis to his left abdomen and left leg in May 2024.  Blood cultures collected 10/30/24 are pending.  Patient is receiving Cefazolin and Doxycycline. Patient has had a Tmax 99.3.  WBC 15.3.  No plans for acute CV intervention at this time.             Patient seen with wife at bedside.  Wife reports that she noted patient's right leg was very red/dark red/almost black appearing on Sunday.  Patient/wife endorse previous hospitalization in May 2024 for 
sulfate 2000 mg in 50 mL IVPB premix  2,000 mg IntraVENous PRN    ondansetron (ZOFRAN-ODT) disintegrating tablet 4 mg  4 mg Oral Q8H PRN    Or    ondansetron (ZOFRAN) injection 4 mg  4 mg IntraVENous Q6H PRN    acetaminophen (TYLENOL) tablet 650 mg  650 mg Oral Q6H PRN    Or    acetaminophen (TYLENOL) suppository 650 mg  650 mg Rectal Q6H PRN    polyethylene glycol (GLYCOLAX) packet 17 g  17 g Oral Daily PRN    [Held by provider] nitroGLYCERIN 200 mcg/mL in dextrose 5%  5-100 mcg/min IntraVENous Continuous    morphine (PF) injection 2 mg  2 mg IntraVENous Q2H PRN       Signed:  Emilia Kwon MD    Part of this note may have been written by using a voice dictation software.  The note has been proof read but may still contain some grammatical/other typographical errors.

## 2024-10-31 LAB
ANION GAP SERPL CALC-SCNC: 11 MMOL/L (ref 7–16)
APTT PPP: 29.3 SEC (ref 23.3–37.4)
APTT PPP: 36.6 SEC (ref 23.3–37.4)
APTT PPP: 49.4 SEC (ref 23.3–37.4)
BUN SERPL-MCNC: 38 MG/DL (ref 8–23)
CALCIUM SERPL-MCNC: 8.4 MG/DL (ref 8.8–10.2)
CHLORIDE SERPL-SCNC: 96 MMOL/L (ref 98–107)
CO2 SERPL-SCNC: 21 MMOL/L (ref 20–29)
CREAT SERPL-MCNC: 1.62 MG/DL (ref 0.8–1.3)
ERYTHROCYTE [DISTWIDTH] IN BLOOD BY AUTOMATED COUNT: 13.8 % (ref 11.9–14.6)
GLUCOSE SERPL-MCNC: 151 MG/DL (ref 70–99)
HCT VFR BLD AUTO: 33.9 % (ref 41.1–50.3)
HGB BLD-MCNC: 11.4 G/DL (ref 13.6–17.2)
MCH RBC QN AUTO: 31.6 PG (ref 26.1–32.9)
MCHC RBC AUTO-ENTMCNC: 33.6 G/DL (ref 31.4–35)
MCV RBC AUTO: 93.9 FL (ref 82–102)
NRBC # BLD: 0 K/UL (ref 0–0.2)
NT PRO BNP: ABNORMAL PG/ML (ref 0–450)
PLATELET # BLD AUTO: 153 K/UL (ref 150–450)
PMV BLD AUTO: 9 FL (ref 9.4–12.3)
POTASSIUM SERPL-SCNC: 4.4 MMOL/L (ref 3.5–5.1)
RBC # BLD AUTO: 3.61 M/UL (ref 4.23–5.6)
SODIUM SERPL-SCNC: 127 MMOL/L (ref 136–145)
WBC # BLD AUTO: 12.5 K/UL (ref 4.3–11.1)

## 2024-10-31 PROCEDURE — 6360000002 HC RX W HCPCS: Performed by: PHYSICIAN ASSISTANT

## 2024-10-31 PROCEDURE — 6360000002 HC RX W HCPCS: Performed by: INTERNAL MEDICINE

## 2024-10-31 PROCEDURE — 2580000003 HC RX 258: Performed by: STUDENT IN AN ORGANIZED HEALTH CARE EDUCATION/TRAINING PROGRAM

## 2024-10-31 PROCEDURE — 6370000000 HC RX 637 (ALT 250 FOR IP): Performed by: INTERNAL MEDICINE

## 2024-10-31 PROCEDURE — 6360000002 HC RX W HCPCS: Performed by: STUDENT IN AN ORGANIZED HEALTH CARE EDUCATION/TRAINING PROGRAM

## 2024-10-31 PROCEDURE — 99232 SBSQ HOSP IP/OBS MODERATE 35: CPT | Performed by: INTERNAL MEDICINE

## 2024-10-31 PROCEDURE — 6370000000 HC RX 637 (ALT 250 FOR IP): Performed by: PHYSICIAN ASSISTANT

## 2024-10-31 PROCEDURE — 2140000000 HC CCU INTERMEDIATE R&B

## 2024-10-31 PROCEDURE — 80048 BASIC METABOLIC PNL TOTAL CA: CPT

## 2024-10-31 PROCEDURE — 85730 THROMBOPLASTIN TIME PARTIAL: CPT

## 2024-10-31 PROCEDURE — 2060000000 HC ICU INTERMEDIATE R&B

## 2024-10-31 PROCEDURE — 2580000003 HC RX 258: Performed by: PHYSICIAN ASSISTANT

## 2024-10-31 PROCEDURE — 85027 COMPLETE CBC AUTOMATED: CPT

## 2024-10-31 PROCEDURE — 83880 ASSAY OF NATRIURETIC PEPTIDE: CPT

## 2024-10-31 PROCEDURE — 36415 COLL VENOUS BLD VENIPUNCTURE: CPT

## 2024-10-31 PROCEDURE — 94660 CPAP INITIATION&MGMT: CPT

## 2024-10-31 PROCEDURE — 2500000003 HC RX 250 WO HCPCS: Performed by: NURSE PRACTITIONER

## 2024-10-31 RX ORDER — FUROSEMIDE 10 MG/ML
40 INJECTION INTRAMUSCULAR; INTRAVENOUS 2 TIMES DAILY
Status: DISCONTINUED | OUTPATIENT
Start: 2024-10-31 | End: 2024-11-03

## 2024-10-31 RX ADMIN — GUAIFENESIN 200 MG: 200 SOLUTION ORAL at 17:51

## 2024-10-31 RX ADMIN — ASPIRIN 81 MG 81 MG: 81 TABLET ORAL at 09:19

## 2024-10-31 RX ADMIN — HEPARIN SODIUM 4000 UNITS: 1000 INJECTION INTRAVENOUS; SUBCUTANEOUS at 11:24

## 2024-10-31 RX ADMIN — POLYETHYLENE GLYCOL 3350 17 G: 17 POWDER, FOR SOLUTION ORAL at 09:19

## 2024-10-31 RX ADMIN — LINEZOLID 600 MG: 600 TABLET, FILM COATED ORAL at 09:19

## 2024-10-31 RX ADMIN — CEFAZOLIN 2000 MG: 2 INJECTION, POWDER, FOR SOLUTION INTRAMUSCULAR; INTRAVENOUS at 05:01

## 2024-10-31 RX ADMIN — LINEZOLID 600 MG: 600 TABLET, FILM COATED ORAL at 21:54

## 2024-10-31 RX ADMIN — SODIUM CHLORIDE, PRESERVATIVE FREE 10 ML: 5 INJECTION INTRAVENOUS at 09:21

## 2024-10-31 RX ADMIN — HEPARIN SODIUM 11 UNITS/KG/HR: 10000 INJECTION, SOLUTION INTRAVENOUS at 07:14

## 2024-10-31 RX ADMIN — RANOLAZINE 1000 MG: 500 TABLET, FILM COATED, EXTENDED RELEASE ORAL at 09:19

## 2024-10-31 RX ADMIN — HEPARIN SODIUM 4000 UNITS: 1000 INJECTION INTRAVENOUS; SUBCUTANEOUS at 18:29

## 2024-10-31 RX ADMIN — CEFAZOLIN 2000 MG: 2 INJECTION, POWDER, FOR SOLUTION INTRAMUSCULAR; INTRAVENOUS at 22:34

## 2024-10-31 RX ADMIN — HEPARIN SODIUM 4000 UNITS: 1000 INJECTION INTRAVENOUS; SUBCUTANEOUS at 04:48

## 2024-10-31 RX ADMIN — CEFAZOLIN 2000 MG: 2 INJECTION, POWDER, FOR SOLUTION INTRAMUSCULAR; INTRAVENOUS at 13:07

## 2024-10-31 RX ADMIN — SODIUM CHLORIDE, PRESERVATIVE FREE 10 ML: 5 INJECTION INTRAVENOUS at 21:54

## 2024-10-31 RX ADMIN — RANOLAZINE 1000 MG: 500 TABLET, FILM COATED, EXTENDED RELEASE ORAL at 21:54

## 2024-10-31 RX ADMIN — FUROSEMIDE 40 MG: 10 INJECTION, SOLUTION INTRAMUSCULAR; INTRAVENOUS at 21:54

## 2024-10-31 ASSESSMENT — PAIN SCALES - GENERAL
PAINLEVEL_OUTOF10: 0

## 2024-10-31 NOTE — ICUWATCH
RRT Clinical Rounding Nurse Update    Vitals:    10/30/24 1200 10/30/24 1535 10/30/24 1930 10/30/24 2015   BP: 93/64 92/70  97/69   Pulse: (!) 102   99   Resp: 18 13 28 15   Temp: 98.6 °F (37 °C) 97.7 °F (36.5 °C)  99 °F (37.2 °C)   TempSrc: Oral Oral     SpO2: 98%   100%   Weight:       Height:            DETERIORATION INDEX SCORE: 48    ASSESSMENT:  Previous outreach assessment was reviewed.  Pt is resting in bed, alert and oriented, following commands and answering questions appropriately. Unlabored, regular breathing on CPAP, 30% FiO2. Lung sounds with fine crackles in bases, clear on uppers. Telemetry monitor HR 90's, appears sinus. No major complaints, VSS.     PLAN:  Will follow per RRT Clinical Rounding Program protocol.    Hood Anthony RN  [facility]: [phone]  [facility]: [phone]

## 2024-11-01 LAB
ANION GAP SERPL CALC-SCNC: 10 MMOL/L (ref 7–16)
APTT PPP: 105 SEC (ref 23.3–37.4)
APTT PPP: 108.1 SEC (ref 23.3–37.4)
BUN SERPL-MCNC: 37 MG/DL (ref 8–23)
CALCIUM SERPL-MCNC: 8.2 MG/DL (ref 8.8–10.2)
CHLORIDE SERPL-SCNC: 96 MMOL/L (ref 98–107)
CO2 SERPL-SCNC: 23 MMOL/L (ref 20–29)
CREAT SERPL-MCNC: 1.45 MG/DL (ref 0.8–1.3)
ERYTHROCYTE [DISTWIDTH] IN BLOOD BY AUTOMATED COUNT: 13.7 % (ref 11.9–14.6)
GLUCOSE SERPL-MCNC: 139 MG/DL (ref 70–99)
HCT VFR BLD AUTO: 33.5 % (ref 41.1–50.3)
HGB BLD-MCNC: 11.3 G/DL (ref 13.6–17.2)
MCH RBC QN AUTO: 32 PG (ref 26.1–32.9)
MCHC RBC AUTO-ENTMCNC: 33.7 G/DL (ref 31.4–35)
MCV RBC AUTO: 94.9 FL (ref 82–102)
NRBC # BLD: 0 K/UL (ref 0–0.2)
PLATELET # BLD AUTO: 158 K/UL (ref 150–450)
PMV BLD AUTO: 8.7 FL (ref 9.4–12.3)
POTASSIUM SERPL-SCNC: 4.3 MMOL/L (ref 3.5–5.1)
RBC # BLD AUTO: 3.53 M/UL (ref 4.23–5.6)
SODIUM SERPL-SCNC: 128 MMOL/L (ref 136–145)
WBC # BLD AUTO: 9.4 K/UL (ref 4.3–11.1)

## 2024-11-01 PROCEDURE — 80048 BASIC METABOLIC PNL TOTAL CA: CPT

## 2024-11-01 PROCEDURE — 85027 COMPLETE CBC AUTOMATED: CPT

## 2024-11-01 PROCEDURE — 6370000000 HC RX 637 (ALT 250 FOR IP): Performed by: INTERNAL MEDICINE

## 2024-11-01 PROCEDURE — 2700000000 HC OXYGEN THERAPY PER DAY

## 2024-11-01 PROCEDURE — 2580000003 HC RX 258: Performed by: PHYSICIAN ASSISTANT

## 2024-11-01 PROCEDURE — 6360000002 HC RX W HCPCS: Performed by: STUDENT IN AN ORGANIZED HEALTH CARE EDUCATION/TRAINING PROGRAM

## 2024-11-01 PROCEDURE — 99232 SBSQ HOSP IP/OBS MODERATE 35: CPT | Performed by: INTERNAL MEDICINE

## 2024-11-01 PROCEDURE — 2140000000 HC CCU INTERMEDIATE R&B

## 2024-11-01 PROCEDURE — 2060000000 HC ICU INTERMEDIATE R&B

## 2024-11-01 PROCEDURE — 85730 THROMBOPLASTIN TIME PARTIAL: CPT

## 2024-11-01 PROCEDURE — 2580000003 HC RX 258: Performed by: STUDENT IN AN ORGANIZED HEALTH CARE EDUCATION/TRAINING PROGRAM

## 2024-11-01 PROCEDURE — 6370000000 HC RX 637 (ALT 250 FOR IP): Performed by: PHYSICIAN ASSISTANT

## 2024-11-01 PROCEDURE — 6360000002 HC RX W HCPCS: Performed by: INTERNAL MEDICINE

## 2024-11-01 PROCEDURE — 2500000003 HC RX 250 WO HCPCS: Performed by: NURSE PRACTITIONER

## 2024-11-01 PROCEDURE — 36415 COLL VENOUS BLD VENIPUNCTURE: CPT

## 2024-11-01 PROCEDURE — 6370000000 HC RX 637 (ALT 250 FOR IP): Performed by: CASE MANAGER/CARE COORDINATOR

## 2024-11-01 RX ORDER — BENZONATATE 100 MG/1
200 CAPSULE ORAL 3 TIMES DAILY PRN
Status: DISCONTINUED | OUTPATIENT
Start: 2024-11-01 | End: 2024-11-07 | Stop reason: HOSPADM

## 2024-11-01 RX ORDER — RANOLAZINE 500 MG/1
500 TABLET, EXTENDED RELEASE ORAL 2 TIMES DAILY
Status: DISCONTINUED | OUTPATIENT
Start: 2024-11-01 | End: 2024-11-07 | Stop reason: HOSPADM

## 2024-11-01 RX ADMIN — SODIUM CHLORIDE, PRESERVATIVE FREE 10 ML: 5 INJECTION INTRAVENOUS at 10:20

## 2024-11-01 RX ADMIN — ACETAMINOPHEN 650 MG: 325 TABLET ORAL at 10:24

## 2024-11-01 RX ADMIN — GUAIFENESIN 200 MG: 200 SOLUTION ORAL at 18:28

## 2024-11-01 RX ADMIN — SODIUM CHLORIDE, PRESERVATIVE FREE 10 ML: 5 INJECTION INTRAVENOUS at 20:41

## 2024-11-01 RX ADMIN — HEPARIN SODIUM 19 UNITS/KG/HR: 10000 INJECTION, SOLUTION INTRAVENOUS at 01:18

## 2024-11-01 RX ADMIN — CEFAZOLIN 2000 MG: 2 INJECTION, POWDER, FOR SOLUTION INTRAMUSCULAR; INTRAVENOUS at 04:48

## 2024-11-01 RX ADMIN — FUROSEMIDE 40 MG: 10 INJECTION, SOLUTION INTRAMUSCULAR; INTRAVENOUS at 10:00

## 2024-11-01 RX ADMIN — BENZONATATE 200 MG: 100 CAPSULE ORAL at 20:36

## 2024-11-01 RX ADMIN — ASPIRIN 81 MG 81 MG: 81 TABLET ORAL at 10:00

## 2024-11-01 RX ADMIN — RANOLAZINE 500 MG: 500 TABLET, FILM COATED, EXTENDED RELEASE ORAL at 20:36

## 2024-11-01 RX ADMIN — FUROSEMIDE 40 MG: 10 INJECTION, SOLUTION INTRAMUSCULAR; INTRAVENOUS at 18:27

## 2024-11-01 RX ADMIN — LINEZOLID 600 MG: 600 TABLET, FILM COATED ORAL at 10:00

## 2024-11-01 RX ADMIN — LINEZOLID 600 MG: 600 TABLET, FILM COATED ORAL at 20:36

## 2024-11-01 RX ADMIN — HEPARIN SODIUM 19 UNITS/KG/HR: 10000 INJECTION, SOLUTION INTRAVENOUS at 15:20

## 2024-11-01 RX ADMIN — RANOLAZINE 500 MG: 500 TABLET, FILM COATED, EXTENDED RELEASE ORAL at 10:00

## 2024-11-01 ASSESSMENT — PAIN DESCRIPTION - ORIENTATION: ORIENTATION: LEFT;RIGHT

## 2024-11-01 ASSESSMENT — PAIN SCALES - GENERAL
PAINLEVEL_OUTOF10: 0
PAINLEVEL_OUTOF10: 3
PAINLEVEL_OUTOF10: 0

## 2024-11-01 ASSESSMENT — PAIN DESCRIPTION - LOCATION: LOCATION: LEG

## 2024-11-01 ASSESSMENT — PAIN DESCRIPTION - DESCRIPTORS: DESCRIPTORS: ACHING

## 2024-11-01 ASSESSMENT — PAIN - FUNCTIONAL ASSESSMENT: PAIN_FUNCTIONAL_ASSESSMENT: ACTIVITIES ARE NOT PREVENTED

## 2024-11-01 NOTE — MANAGEMENT PLAN
Cardiology update    -Called to bedside by the patient's primary nurse due to increase in supplemental oxygen from 2 to 3 L.  On exam the patient is lying comfortably in bed asleep currently on CPAP.  Respirations between 20-24 breaths/min.  High percent oxygen saturation.  He is in no acute distress.  Bedside monitor shows sinus rhythm at 90 bpm.  Continue diuretics as previously ordered.  I made no changes to his regimen at this point.  I instructed the primary nurse to call me if there are any concerns or changes.

## 2024-11-01 NOTE — NURSE NAVIGATOR
CHF handbook provided to the patient and family. I asked them to please call me for any questions.

## 2024-11-01 NOTE — ICUWATCH
RRT Clinical Rounding Nurse Update    Vitals:    10/31/24 2212 10/31/24 2356 11/01/24 0145 11/01/24 0319   BP:  (!) 96/59  (!) 92/56   Pulse:  95  95   Resp: 24 18 20 17   Temp:  99 °F (37.2 °C)  98.1 °F (36.7 °C)   TempSrc:  Axillary  Oral   SpO2:  99%  95%   Weight:       Height:            DETERIORATION INDEX SCORE: 44    ASSESSMENT:  There have been no significant changes since previous assessment. Mild increase in O2 requirements in the interim, placed on CPAP for the night, but had to be removed shortly after due to nausea. Otherwise, VS and rhythm remain stable.     PLAN:  Will discharge from RRT Clinical Rounding Program per protocol. Please call if needed.    Hood Anthony RN  [facility]: [phone]  [facility]: [phone]

## 2024-11-01 NOTE — CARE COORDINATION
CM reviewed clinical notes. Remains on 3 lpm. Heparin. Cardiology titrating meds for optimum therapy with hypotension continuing. Cellulitis (R) leg.  ID rec Ancef and Linezolid (EOT 11/14/24) ongoing.   PT/OT eval if B/P tolerates.  LOS 3D.

## 2024-11-01 NOTE — ICUWATCH
RRT Clinical Rounding Nurse Update    Vitals:    10/31/24 1642 10/31/24 1745 10/31/24 2005 10/31/24 2016   BP: 103/61  (!) 154/117    Pulse: 100 (!) 102 (!) 103 (!) 105   Resp: 18  17 22   Temp: 98.1 °F (36.7 °C)  98.1 °F (36.7 °C)    TempSrc: Oral  Oral    SpO2: 95%  98% 95%   Weight:       Height:            DETERIORATION INDEX SCORE: 54    ASSESSMENT:  Previous outreach assessment was reviewed.  Pt is resting in bed, alert and oriented, following commands and answering questions appropriately. Slightly labored, tachypneic breathing on 2LNC. SpO2 showing 96-97%. Titrated up to 2LNC. Lung sounds appear diminished. Telemetry monitor appears sinus, 's. Spoke with primary RN and RT about using CPAP early tonight and possibly morphine to help decrease O2 demand. Otherwise, VSS, pressure more stable today.     PLAN:  Will follow per RRT Clinical Rounding Program protocol.    Hood Anthony RN  [facility]: [phone]  [facility]: [phone]

## 2024-11-02 LAB
ANION GAP SERPL CALC-SCNC: 12 MMOL/L (ref 7–16)
APTT PPP: 56.5 SEC (ref 23.3–37.4)
BUN SERPL-MCNC: 35 MG/DL (ref 8–23)
CALCIUM SERPL-MCNC: 8.5 MG/DL (ref 8.8–10.2)
CHLORIDE SERPL-SCNC: 94 MMOL/L (ref 98–107)
CO2 SERPL-SCNC: 24 MMOL/L (ref 20–29)
CREAT SERPL-MCNC: 1.28 MG/DL (ref 0.8–1.3)
ERYTHROCYTE [DISTWIDTH] IN BLOOD BY AUTOMATED COUNT: 13.4 % (ref 11.9–14.6)
GLUCOSE SERPL-MCNC: 129 MG/DL (ref 70–99)
HCT VFR BLD AUTO: 32.5 % (ref 41.1–50.3)
HGB BLD-MCNC: 11.2 G/DL (ref 13.6–17.2)
MCH RBC QN AUTO: 32.3 PG (ref 26.1–32.9)
MCHC RBC AUTO-ENTMCNC: 34.5 G/DL (ref 31.4–35)
MCV RBC AUTO: 93.7 FL (ref 82–102)
NRBC # BLD: 0 K/UL (ref 0–0.2)
PLATELET # BLD AUTO: 176 K/UL (ref 150–450)
PMV BLD AUTO: 8.6 FL (ref 9.4–12.3)
POTASSIUM SERPL-SCNC: 4.2 MMOL/L (ref 3.5–5.1)
RBC # BLD AUTO: 3.47 M/UL (ref 4.23–5.6)
SODIUM SERPL-SCNC: 129 MMOL/L (ref 136–145)
WBC # BLD AUTO: 7.2 K/UL (ref 4.3–11.1)

## 2024-11-02 PROCEDURE — 80048 BASIC METABOLIC PNL TOTAL CA: CPT

## 2024-11-02 PROCEDURE — 6370000000 HC RX 637 (ALT 250 FOR IP): Performed by: INTERNAL MEDICINE

## 2024-11-02 PROCEDURE — 2500000003 HC RX 250 WO HCPCS: Performed by: NURSE PRACTITIONER

## 2024-11-02 PROCEDURE — 36415 COLL VENOUS BLD VENIPUNCTURE: CPT

## 2024-11-02 PROCEDURE — 97161 PT EVAL LOW COMPLEX 20 MIN: CPT

## 2024-11-02 PROCEDURE — 99232 SBSQ HOSP IP/OBS MODERATE 35: CPT | Performed by: INTERNAL MEDICINE

## 2024-11-02 PROCEDURE — 2580000003 HC RX 258: Performed by: PHYSICIAN ASSISTANT

## 2024-11-02 PROCEDURE — 6370000000 HC RX 637 (ALT 250 FOR IP): Performed by: CASE MANAGER/CARE COORDINATOR

## 2024-11-02 PROCEDURE — 6360000002 HC RX W HCPCS: Performed by: PHYSICIAN ASSISTANT

## 2024-11-02 PROCEDURE — 85730 THROMBOPLASTIN TIME PARTIAL: CPT

## 2024-11-02 PROCEDURE — 97530 THERAPEUTIC ACTIVITIES: CPT

## 2024-11-02 PROCEDURE — 6370000000 HC RX 637 (ALT 250 FOR IP): Performed by: PHYSICIAN ASSISTANT

## 2024-11-02 PROCEDURE — 2140000000 HC CCU INTERMEDIATE R&B

## 2024-11-02 PROCEDURE — 6360000002 HC RX W HCPCS: Performed by: INTERNAL MEDICINE

## 2024-11-02 PROCEDURE — 2060000000 HC ICU INTERMEDIATE R&B

## 2024-11-02 PROCEDURE — 85027 COMPLETE CBC AUTOMATED: CPT

## 2024-11-02 PROCEDURE — 2500000003 HC RX 250 WO HCPCS: Performed by: PHYSICIAN ASSISTANT

## 2024-11-02 RX ORDER — METOPROLOL SUCCINATE 25 MG/1
25 TABLET, EXTENDED RELEASE ORAL DAILY
Status: DISCONTINUED | OUTPATIENT
Start: 2024-11-02 | End: 2024-11-03

## 2024-11-02 RX ORDER — METOPROLOL TARTRATE 25 MG/1
25 TABLET, FILM COATED ORAL 2 TIMES DAILY
Status: DISCONTINUED | OUTPATIENT
Start: 2024-11-02 | End: 2024-11-02

## 2024-11-02 RX ADMIN — RANOLAZINE 500 MG: 500 TABLET, FILM COATED, EXTENDED RELEASE ORAL at 20:40

## 2024-11-02 RX ADMIN — APIXABAN 5 MG: 5 TABLET, FILM COATED ORAL at 09:38

## 2024-11-02 RX ADMIN — METOPROLOL SUCCINATE 25 MG: 25 TABLET, EXTENDED RELEASE ORAL at 09:38

## 2024-11-02 RX ADMIN — APIXABAN 5 MG: 5 TABLET, FILM COATED ORAL at 20:40

## 2024-11-02 RX ADMIN — LINEZOLID 600 MG: 600 TABLET, FILM COATED ORAL at 09:38

## 2024-11-02 RX ADMIN — BENZONATATE 200 MG: 100 CAPSULE ORAL at 09:49

## 2024-11-02 RX ADMIN — HEPARIN SODIUM 2000 UNITS: 1000 INJECTION, SOLUTION INTRAVENOUS; SUBCUTANEOUS at 07:06

## 2024-11-02 RX ADMIN — MORPHINE SULFATE 2 MG: 2 INJECTION, SOLUTION INTRAMUSCULAR; INTRAVENOUS at 20:40

## 2024-11-02 RX ADMIN — RANOLAZINE 500 MG: 500 TABLET, FILM COATED, EXTENDED RELEASE ORAL at 09:38

## 2024-11-02 RX ADMIN — FUROSEMIDE 40 MG: 10 INJECTION, SOLUTION INTRAMUSCULAR; INTRAVENOUS at 09:38

## 2024-11-02 RX ADMIN — ASPIRIN 81 MG 81 MG: 81 TABLET ORAL at 09:38

## 2024-11-02 RX ADMIN — LINEZOLID 600 MG: 600 TABLET, FILM COATED ORAL at 20:40

## 2024-11-02 RX ADMIN — FUROSEMIDE 40 MG: 10 INJECTION, SOLUTION INTRAMUSCULAR; INTRAVENOUS at 17:17

## 2024-11-02 RX ADMIN — GUAIFENESIN 200 MG: 200 SOLUTION ORAL at 21:37

## 2024-11-02 RX ADMIN — SODIUM CHLORIDE, PRESERVATIVE FREE 10 ML: 5 INJECTION INTRAVENOUS at 20:40

## 2024-11-02 RX ADMIN — SODIUM CHLORIDE, PRESERVATIVE FREE 10 ML: 5 INJECTION INTRAVENOUS at 09:38

## 2024-11-02 ASSESSMENT — PAIN DESCRIPTION - ORIENTATION: ORIENTATION: LEFT;MID

## 2024-11-02 ASSESSMENT — PAIN SCALES - GENERAL
PAINLEVEL_OUTOF10: 10
PAINLEVEL_OUTOF10: 0
PAINLEVEL_OUTOF10: 0

## 2024-11-02 ASSESSMENT — PAIN DESCRIPTION - LOCATION: LOCATION: BACK;HIP

## 2024-11-03 LAB
ANION GAP SERPL CALC-SCNC: 12 MMOL/L (ref 7–16)
BUN SERPL-MCNC: 32 MG/DL (ref 8–23)
CALCIUM SERPL-MCNC: 8.6 MG/DL (ref 8.8–10.2)
CHLORIDE SERPL-SCNC: 95 MMOL/L (ref 98–107)
CO2 SERPL-SCNC: 25 MMOL/L (ref 20–29)
CREAT SERPL-MCNC: 1.12 MG/DL (ref 0.8–1.3)
ERYTHROCYTE [DISTWIDTH] IN BLOOD BY AUTOMATED COUNT: 13.4 % (ref 11.9–14.6)
GLUCOSE SERPL-MCNC: 114 MG/DL (ref 70–99)
HCT VFR BLD AUTO: 35 % (ref 41.1–50.3)
HGB BLD-MCNC: 11.8 G/DL (ref 13.6–17.2)
MCH RBC QN AUTO: 32.4 PG (ref 26.1–32.9)
MCHC RBC AUTO-ENTMCNC: 33.7 G/DL (ref 31.4–35)
MCV RBC AUTO: 96.2 FL (ref 82–102)
NRBC # BLD: 0 K/UL (ref 0–0.2)
PLATELET # BLD AUTO: 219 K/UL (ref 150–450)
PMV BLD AUTO: 8.7 FL (ref 9.4–12.3)
POTASSIUM SERPL-SCNC: 4.2 MMOL/L (ref 3.5–5.1)
RBC # BLD AUTO: 3.64 M/UL (ref 4.23–5.6)
SODIUM SERPL-SCNC: 132 MMOL/L (ref 136–145)
WBC # BLD AUTO: 6.7 K/UL (ref 4.3–11.1)

## 2024-11-03 PROCEDURE — 85027 COMPLETE CBC AUTOMATED: CPT

## 2024-11-03 PROCEDURE — 6370000000 HC RX 637 (ALT 250 FOR IP): Performed by: CASE MANAGER/CARE COORDINATOR

## 2024-11-03 PROCEDURE — 2140000000 HC CCU INTERMEDIATE R&B

## 2024-11-03 PROCEDURE — 6370000000 HC RX 637 (ALT 250 FOR IP): Performed by: INTERNAL MEDICINE

## 2024-11-03 PROCEDURE — 6370000000 HC RX 637 (ALT 250 FOR IP): Performed by: PHYSICIAN ASSISTANT

## 2024-11-03 PROCEDURE — 99232 SBSQ HOSP IP/OBS MODERATE 35: CPT | Performed by: INTERNAL MEDICINE

## 2024-11-03 PROCEDURE — 2700000000 HC OXYGEN THERAPY PER DAY

## 2024-11-03 PROCEDURE — 2580000003 HC RX 258: Performed by: PHYSICIAN ASSISTANT

## 2024-11-03 PROCEDURE — 6360000002 HC RX W HCPCS: Performed by: INTERNAL MEDICINE

## 2024-11-03 PROCEDURE — 80048 BASIC METABOLIC PNL TOTAL CA: CPT

## 2024-11-03 PROCEDURE — 2060000000 HC ICU INTERMEDIATE R&B

## 2024-11-03 PROCEDURE — 36415 COLL VENOUS BLD VENIPUNCTURE: CPT

## 2024-11-03 RX ORDER — FUROSEMIDE 10 MG/ML
60 INJECTION INTRAMUSCULAR; INTRAVENOUS 2 TIMES DAILY
Status: DISCONTINUED | OUTPATIENT
Start: 2024-11-03 | End: 2024-11-07 | Stop reason: HOSPADM

## 2024-11-03 RX ORDER — METOPROLOL SUCCINATE 25 MG/1
50 TABLET, EXTENDED RELEASE ORAL DAILY
Status: DISCONTINUED | OUTPATIENT
Start: 2024-11-03 | End: 2024-11-04

## 2024-11-03 RX ADMIN — LINEZOLID 600 MG: 600 TABLET, FILM COATED ORAL at 09:26

## 2024-11-03 RX ADMIN — ASPIRIN 81 MG 81 MG: 81 TABLET ORAL at 09:26

## 2024-11-03 RX ADMIN — ACETAMINOPHEN 650 MG: 325 TABLET ORAL at 20:38

## 2024-11-03 RX ADMIN — APIXABAN 5 MG: 5 TABLET, FILM COATED ORAL at 20:30

## 2024-11-03 RX ADMIN — RANOLAZINE 500 MG: 500 TABLET, FILM COATED, EXTENDED RELEASE ORAL at 09:26

## 2024-11-03 RX ADMIN — FUROSEMIDE 60 MG: 10 INJECTION, SOLUTION INTRAMUSCULAR; INTRAVENOUS at 09:26

## 2024-11-03 RX ADMIN — APIXABAN 5 MG: 5 TABLET, FILM COATED ORAL at 09:26

## 2024-11-03 RX ADMIN — RANOLAZINE 500 MG: 500 TABLET, FILM COATED, EXTENDED RELEASE ORAL at 20:23

## 2024-11-03 RX ADMIN — SODIUM CHLORIDE, PRESERVATIVE FREE 10 ML: 5 INJECTION INTRAVENOUS at 20:25

## 2024-11-03 RX ADMIN — BENZONATATE 200 MG: 100 CAPSULE ORAL at 16:53

## 2024-11-03 RX ADMIN — LINEZOLID 600 MG: 600 TABLET, FILM COATED ORAL at 20:30

## 2024-11-03 RX ADMIN — SODIUM CHLORIDE, PRESERVATIVE FREE 10 ML: 5 INJECTION INTRAVENOUS at 09:27

## 2024-11-03 RX ADMIN — METOPROLOL SUCCINATE 50 MG: 25 TABLET, EXTENDED RELEASE ORAL at 09:26

## 2024-11-03 RX ADMIN — FUROSEMIDE 60 MG: 10 INJECTION, SOLUTION INTRAMUSCULAR; INTRAVENOUS at 17:38

## 2024-11-03 ASSESSMENT — PAIN DESCRIPTION - ORIENTATION: ORIENTATION: MID;POSTERIOR

## 2024-11-03 ASSESSMENT — PAIN DESCRIPTION - DESCRIPTORS: DESCRIPTORS: ACHING;SORE

## 2024-11-03 ASSESSMENT — PAIN SCALES - GENERAL
PAINLEVEL_OUTOF10: 0
PAINLEVEL_OUTOF10: 4
PAINLEVEL_OUTOF10: 1
PAINLEVEL_OUTOF10: 0

## 2024-11-03 ASSESSMENT — PAIN SCALES - WONG BAKER: WONGBAKER_NUMERICALRESPONSE: HURTS A LITTLE BIT

## 2024-11-03 ASSESSMENT — PAIN DESCRIPTION - LOCATION: LOCATION: BACK

## 2024-11-04 LAB
ANION GAP SERPL CALC-SCNC: 12 MMOL/L (ref 7–16)
BACTERIA SPEC CULT: NORMAL
BACTERIA SPEC CULT: NORMAL
BUN SERPL-MCNC: 30 MG/DL (ref 8–23)
CALCIUM SERPL-MCNC: 9 MG/DL (ref 8.8–10.2)
CHLORIDE SERPL-SCNC: 94 MMOL/L (ref 98–107)
CO2 SERPL-SCNC: 28 MMOL/L (ref 20–29)
CREAT SERPL-MCNC: 1.1 MG/DL (ref 0.8–1.3)
ERYTHROCYTE [DISTWIDTH] IN BLOOD BY AUTOMATED COUNT: 13.4 % (ref 11.9–14.6)
GLUCOSE SERPL-MCNC: 139 MG/DL (ref 70–99)
HCT VFR BLD AUTO: 37 % (ref 41.1–50.3)
HGB BLD-MCNC: 12.2 G/DL (ref 13.6–17.2)
MCH RBC QN AUTO: 32.1 PG (ref 26.1–32.9)
MCHC RBC AUTO-ENTMCNC: 33 G/DL (ref 31.4–35)
MCV RBC AUTO: 97.4 FL (ref 82–102)
NRBC # BLD: 0 K/UL (ref 0–0.2)
PLATELET # BLD AUTO: 201 K/UL (ref 150–450)
PMV BLD AUTO: 8.4 FL (ref 9.4–12.3)
POTASSIUM SERPL-SCNC: 4.2 MMOL/L (ref 3.5–5.1)
RBC # BLD AUTO: 3.8 M/UL (ref 4.23–5.6)
SERVICE CMNT-IMP: NORMAL
SERVICE CMNT-IMP: NORMAL
SODIUM SERPL-SCNC: 134 MMOL/L (ref 136–145)
WBC # BLD AUTO: 6.9 K/UL (ref 4.3–11.1)

## 2024-11-04 PROCEDURE — 6370000000 HC RX 637 (ALT 250 FOR IP): Performed by: INTERNAL MEDICINE

## 2024-11-04 PROCEDURE — 6370000000 HC RX 637 (ALT 250 FOR IP): Performed by: PHYSICIAN ASSISTANT

## 2024-11-04 PROCEDURE — 85027 COMPLETE CBC AUTOMATED: CPT

## 2024-11-04 PROCEDURE — 2500000003 HC RX 250 WO HCPCS: Performed by: NURSE PRACTITIONER

## 2024-11-04 PROCEDURE — 2140000000 HC CCU INTERMEDIATE R&B

## 2024-11-04 PROCEDURE — 2580000003 HC RX 258: Performed by: PHYSICIAN ASSISTANT

## 2024-11-04 PROCEDURE — 97110 THERAPEUTIC EXERCISES: CPT

## 2024-11-04 PROCEDURE — 2060000000 HC ICU INTERMEDIATE R&B

## 2024-11-04 PROCEDURE — 6370000000 HC RX 637 (ALT 250 FOR IP): Performed by: CASE MANAGER/CARE COORDINATOR

## 2024-11-04 PROCEDURE — 2500000003 HC RX 250 WO HCPCS: Performed by: PHYSICIAN ASSISTANT

## 2024-11-04 PROCEDURE — 97530 THERAPEUTIC ACTIVITIES: CPT

## 2024-11-04 PROCEDURE — 36415 COLL VENOUS BLD VENIPUNCTURE: CPT

## 2024-11-04 PROCEDURE — 6360000002 HC RX W HCPCS: Performed by: INTERNAL MEDICINE

## 2024-11-04 PROCEDURE — 2700000000 HC OXYGEN THERAPY PER DAY

## 2024-11-04 PROCEDURE — 80048 BASIC METABOLIC PNL TOTAL CA: CPT

## 2024-11-04 RX ORDER — METOPROLOL SUCCINATE 100 MG/1
100 TABLET, EXTENDED RELEASE ORAL DAILY
Status: DISCONTINUED | OUTPATIENT
Start: 2024-11-05 | End: 2024-11-06

## 2024-11-04 RX ADMIN — RANOLAZINE 500 MG: 500 TABLET, FILM COATED, EXTENDED RELEASE ORAL at 20:10

## 2024-11-04 RX ADMIN — APIXABAN 5 MG: 5 TABLET, FILM COATED ORAL at 20:10

## 2024-11-04 RX ADMIN — MORPHINE SULFATE 2 MG: 2 INJECTION, SOLUTION INTRAMUSCULAR; INTRAVENOUS at 14:23

## 2024-11-04 RX ADMIN — SODIUM CHLORIDE, PRESERVATIVE FREE 10 ML: 5 INJECTION INTRAVENOUS at 08:31

## 2024-11-04 RX ADMIN — MUSCLE RUB CREAM: 100; 150 CREAM TOPICAL at 12:15

## 2024-11-04 RX ADMIN — SODIUM CHLORIDE, PRESERVATIVE FREE 10 ML: 5 INJECTION INTRAVENOUS at 20:10

## 2024-11-04 RX ADMIN — METOPROLOL SUCCINATE 50 MG: 25 TABLET, EXTENDED RELEASE ORAL at 08:25

## 2024-11-04 RX ADMIN — RANOLAZINE 500 MG: 500 TABLET, FILM COATED, EXTENDED RELEASE ORAL at 08:25

## 2024-11-04 RX ADMIN — ACETAMINOPHEN 650 MG: 325 TABLET ORAL at 10:56

## 2024-11-04 RX ADMIN — FUROSEMIDE 60 MG: 10 INJECTION, SOLUTION INTRAMUSCULAR; INTRAVENOUS at 08:25

## 2024-11-04 RX ADMIN — GUAIFENESIN 200 MG: 200 SOLUTION ORAL at 20:14

## 2024-11-04 RX ADMIN — FUROSEMIDE 60 MG: 10 INJECTION, SOLUTION INTRAMUSCULAR; INTRAVENOUS at 18:46

## 2024-11-04 RX ADMIN — GUAIFENESIN 200 MG: 200 SOLUTION ORAL at 12:24

## 2024-11-04 RX ADMIN — ASPIRIN 81 MG 81 MG: 81 TABLET ORAL at 08:25

## 2024-11-04 RX ADMIN — POLYETHYLENE GLYCOL 3350 17 G: 17 POWDER, FOR SOLUTION ORAL at 16:22

## 2024-11-04 RX ADMIN — APIXABAN 5 MG: 5 TABLET, FILM COATED ORAL at 08:26

## 2024-11-04 RX ADMIN — BENZONATATE 200 MG: 100 CAPSULE ORAL at 20:14

## 2024-11-04 RX ADMIN — LINEZOLID 600 MG: 600 TABLET, FILM COATED ORAL at 08:26

## 2024-11-04 ASSESSMENT — PAIN DESCRIPTION - ORIENTATION
ORIENTATION: LEFT

## 2024-11-04 ASSESSMENT — PAIN SCALES - GENERAL
PAINLEVEL_OUTOF10: 4
PAINLEVEL_OUTOF10: 0
PAINLEVEL_OUTOF10: 7
PAINLEVEL_OUTOF10: 0
PAINLEVEL_OUTOF10: 3
PAINLEVEL_OUTOF10: 4
PAINLEVEL_OUTOF10: 9

## 2024-11-04 ASSESSMENT — PAIN - FUNCTIONAL ASSESSMENT: PAIN_FUNCTIONAL_ASSESSMENT: PREVENTS OR INTERFERES SOME ACTIVE ACTIVITIES AND ADLS

## 2024-11-04 ASSESSMENT — PAIN DESCRIPTION - DESCRIPTORS
DESCRIPTORS: ACHING

## 2024-11-04 ASSESSMENT — PAIN DESCRIPTION - LOCATION
LOCATION: HIP

## 2024-11-04 NOTE — CARE COORDINATION
CM reviewed clinical notes. On 2 lpm NC. Cardiology adjusting medications.    PT recommending SNF at discharge 11/2.  CM provided family with SNF list. Family lives in [place]. CM will follow up with preferences after list reviewed.

## 2024-11-05 PROCEDURE — 97110 THERAPEUTIC EXERCISES: CPT

## 2024-11-05 PROCEDURE — 6360000002 HC RX W HCPCS: Performed by: INTERNAL MEDICINE

## 2024-11-05 PROCEDURE — 97530 THERAPEUTIC ACTIVITIES: CPT

## 2024-11-05 PROCEDURE — 2140000000 HC CCU INTERMEDIATE R&B

## 2024-11-05 PROCEDURE — 2580000003 HC RX 258: Performed by: PHYSICIAN ASSISTANT

## 2024-11-05 PROCEDURE — 6370000000 HC RX 637 (ALT 250 FOR IP): Performed by: PHYSICIAN ASSISTANT

## 2024-11-05 PROCEDURE — 99232 SBSQ HOSP IP/OBS MODERATE 35: CPT | Performed by: INTERNAL MEDICINE

## 2024-11-05 PROCEDURE — 6370000000 HC RX 637 (ALT 250 FOR IP): Performed by: INTERNAL MEDICINE

## 2024-11-05 PROCEDURE — 2500000003 HC RX 250 WO HCPCS: Performed by: NURSE PRACTITIONER

## 2024-11-05 PROCEDURE — 2500000003 HC RX 250 WO HCPCS: Performed by: PHYSICIAN ASSISTANT

## 2024-11-05 PROCEDURE — 6370000000 HC RX 637 (ALT 250 FOR IP): Performed by: CASE MANAGER/CARE COORDINATOR

## 2024-11-05 RX ADMIN — GUAIFENESIN 200 MG: 200 SOLUTION ORAL at 20:14

## 2024-11-05 RX ADMIN — FUROSEMIDE 60 MG: 10 INJECTION, SOLUTION INTRAMUSCULAR; INTRAVENOUS at 16:11

## 2024-11-05 RX ADMIN — MORPHINE SULFATE 2 MG: 2 INJECTION, SOLUTION INTRAMUSCULAR; INTRAVENOUS at 04:16

## 2024-11-05 RX ADMIN — SODIUM CHLORIDE, PRESERVATIVE FREE 10 ML: 5 INJECTION INTRAVENOUS at 08:30

## 2024-11-05 RX ADMIN — ASPIRIN 81 MG 81 MG: 81 TABLET ORAL at 08:30

## 2024-11-05 RX ADMIN — MUSCLE RUB CREAM: 100; 150 CREAM TOPICAL at 22:56

## 2024-11-05 RX ADMIN — MORPHINE SULFATE 2 MG: 2 INJECTION, SOLUTION INTRAMUSCULAR; INTRAVENOUS at 13:45

## 2024-11-05 RX ADMIN — RANOLAZINE 500 MG: 500 TABLET, FILM COATED, EXTENDED RELEASE ORAL at 08:30

## 2024-11-05 RX ADMIN — RANOLAZINE 500 MG: 500 TABLET, FILM COATED, EXTENDED RELEASE ORAL at 20:14

## 2024-11-05 RX ADMIN — SODIUM CHLORIDE, PRESERVATIVE FREE 10 ML: 5 INJECTION INTRAVENOUS at 20:14

## 2024-11-05 RX ADMIN — APIXABAN 5 MG: 5 TABLET, FILM COATED ORAL at 20:14

## 2024-11-05 RX ADMIN — MORPHINE SULFATE 2 MG: 2 INJECTION, SOLUTION INTRAMUSCULAR; INTRAVENOUS at 22:56

## 2024-11-05 RX ADMIN — FUROSEMIDE 60 MG: 10 INJECTION, SOLUTION INTRAMUSCULAR; INTRAVENOUS at 08:30

## 2024-11-05 RX ADMIN — BENZONATATE 200 MG: 100 CAPSULE ORAL at 20:14

## 2024-11-05 RX ADMIN — MORPHINE SULFATE 2 MG: 2 INJECTION, SOLUTION INTRAMUSCULAR; INTRAVENOUS at 16:11

## 2024-11-05 RX ADMIN — METOPROLOL SUCCINATE 100 MG: 100 TABLET, EXTENDED RELEASE ORAL at 08:30

## 2024-11-05 RX ADMIN — APIXABAN 5 MG: 5 TABLET, FILM COATED ORAL at 08:30

## 2024-11-05 RX ADMIN — MUSCLE RUB CREAM: 100; 150 CREAM TOPICAL at 04:16

## 2024-11-05 ASSESSMENT — PAIN DESCRIPTION - LOCATION
LOCATION: COCCYX;BACK;BUTTOCKS
LOCATION: HIP;LEG
LOCATION: HIP;LEG
LOCATION: HIP

## 2024-11-05 ASSESSMENT — PAIN SCALES - GENERAL
PAINLEVEL_OUTOF10: 4
PAINLEVEL_OUTOF10: 0
PAINLEVEL_OUTOF10: 9
PAINLEVEL_OUTOF10: 0
PAINLEVEL_OUTOF10: 8
PAINLEVEL_OUTOF10: 1
PAINLEVEL_OUTOF10: 4
PAINLEVEL_OUTOF10: 9
PAINLEVEL_OUTOF10: 0
PAINLEVEL_OUTOF10: 7
PAINLEVEL_OUTOF10: 1

## 2024-11-05 ASSESSMENT — PAIN DESCRIPTION - DESCRIPTORS
DESCRIPTORS: ACHING;PRESSURE;THROBBING
DESCRIPTORS: ACHING;THROBBING
DESCRIPTORS: ACHING;DISCOMFORT;SORE;THROBBING
DESCRIPTORS: ACHING;DISCOMFORT

## 2024-11-05 ASSESSMENT — PAIN DESCRIPTION - ORIENTATION
ORIENTATION: LEFT
ORIENTATION: LEFT;UPPER
ORIENTATION: LEFT;UPPER

## 2024-11-05 ASSESSMENT — PAIN DESCRIPTION - PAIN TYPE
TYPE: CHRONIC PAIN
TYPE: CHRONIC PAIN

## 2024-11-05 ASSESSMENT — PAIN DESCRIPTION - ONSET
ONSET: GRADUAL
ONSET: GRADUAL

## 2024-11-05 ASSESSMENT — PAIN DESCRIPTION - FREQUENCY
FREQUENCY: INTERMITTENT
FREQUENCY: INTERMITTENT

## 2024-11-05 NOTE — CARE COORDINATION
CM contacted by patient's [facility]. CM placed referral. CM requested alternate SNF from family.

## 2024-11-06 LAB
ANION GAP SERPL CALC-SCNC: 8 MMOL/L (ref 7–16)
BUN SERPL-MCNC: 28 MG/DL (ref 8–23)
CALCIUM SERPL-MCNC: 9.1 MG/DL (ref 8.8–10.2)
CHLORIDE SERPL-SCNC: 92 MMOL/L (ref 98–107)
CO2 SERPL-SCNC: 32 MMOL/L (ref 20–29)
CREAT SERPL-MCNC: 1.09 MG/DL (ref 0.8–1.3)
GLUCOSE BLD STRIP.AUTO-MCNC: 114 MG/DL (ref 65–100)
GLUCOSE SERPL-MCNC: 126 MG/DL (ref 70–99)
MAGNESIUM SERPL-MCNC: 2.1 MG/DL (ref 1.8–2.4)
POTASSIUM SERPL-SCNC: 4.6 MMOL/L (ref 3.5–5.1)
SARS-COV-2 RDRP RESP QL NAA+PROBE: NOT DETECTED
SERVICE CMNT-IMP: ABNORMAL
SODIUM SERPL-SCNC: 132 MMOL/L (ref 136–145)
SOURCE: NORMAL

## 2024-11-06 PROCEDURE — 6370000000 HC RX 637 (ALT 250 FOR IP): Performed by: INTERNAL MEDICINE

## 2024-11-06 PROCEDURE — 2500000003 HC RX 250 WO HCPCS: Performed by: PHYSICIAN ASSISTANT

## 2024-11-06 PROCEDURE — 2580000003 HC RX 258: Performed by: PHYSICIAN ASSISTANT

## 2024-11-06 PROCEDURE — 97530 THERAPEUTIC ACTIVITIES: CPT

## 2024-11-06 PROCEDURE — 6360000002 HC RX W HCPCS: Performed by: INTERNAL MEDICINE

## 2024-11-06 PROCEDURE — 83735 ASSAY OF MAGNESIUM: CPT

## 2024-11-06 PROCEDURE — 2700000000 HC OXYGEN THERAPY PER DAY

## 2024-11-06 PROCEDURE — 80048 BASIC METABOLIC PNL TOTAL CA: CPT

## 2024-11-06 PROCEDURE — 36415 COLL VENOUS BLD VENIPUNCTURE: CPT

## 2024-11-06 PROCEDURE — 82962 GLUCOSE BLOOD TEST: CPT

## 2024-11-06 PROCEDURE — 94760 N-INVAS EAR/PLS OXIMETRY 1: CPT

## 2024-11-06 PROCEDURE — 2500000003 HC RX 250 WO HCPCS: Performed by: NURSE PRACTITIONER

## 2024-11-06 PROCEDURE — 2140000000 HC CCU INTERMEDIATE R&B

## 2024-11-06 PROCEDURE — 6370000000 HC RX 637 (ALT 250 FOR IP): Performed by: PHYSICIAN ASSISTANT

## 2024-11-06 PROCEDURE — 87635 SARS-COV-2 COVID-19 AMP PRB: CPT

## 2024-11-06 RX ORDER — METOPROLOL SUCCINATE 25 MG/1
50 TABLET, EXTENDED RELEASE ORAL DAILY
Status: DISCONTINUED | OUTPATIENT
Start: 2024-11-07 | End: 2024-11-07 | Stop reason: HOSPADM

## 2024-11-06 RX ADMIN — RANOLAZINE 500 MG: 500 TABLET, FILM COATED, EXTENDED RELEASE ORAL at 20:15

## 2024-11-06 RX ADMIN — SODIUM CHLORIDE, PRESERVATIVE FREE 10 ML: 5 INJECTION INTRAVENOUS at 20:15

## 2024-11-06 RX ADMIN — GUAIFENESIN 200 MG: 200 SOLUTION ORAL at 17:52

## 2024-11-06 RX ADMIN — FUROSEMIDE 60 MG: 10 INJECTION, SOLUTION INTRAMUSCULAR; INTRAVENOUS at 07:48

## 2024-11-06 RX ADMIN — APIXABAN 5 MG: 5 TABLET, FILM COATED ORAL at 20:15

## 2024-11-06 RX ADMIN — FUROSEMIDE 60 MG: 10 INJECTION, SOLUTION INTRAMUSCULAR; INTRAVENOUS at 17:27

## 2024-11-06 RX ADMIN — METOPROLOL SUCCINATE 100 MG: 100 TABLET, EXTENDED RELEASE ORAL at 07:48

## 2024-11-06 RX ADMIN — ASPIRIN 81 MG 81 MG: 81 TABLET ORAL at 07:48

## 2024-11-06 RX ADMIN — MORPHINE SULFATE 2 MG: 2 INJECTION, SOLUTION INTRAMUSCULAR; INTRAVENOUS at 10:19

## 2024-11-06 RX ADMIN — RANOLAZINE 500 MG: 500 TABLET, FILM COATED, EXTENDED RELEASE ORAL at 07:48

## 2024-11-06 RX ADMIN — SODIUM CHLORIDE, PRESERVATIVE FREE 10 ML: 5 INJECTION INTRAVENOUS at 07:49

## 2024-11-06 RX ADMIN — APIXABAN 5 MG: 5 TABLET, FILM COATED ORAL at 07:48

## 2024-11-06 ASSESSMENT — PAIN DESCRIPTION - DESCRIPTORS: DESCRIPTORS: ACHING;THROBBING

## 2024-11-06 ASSESSMENT — PAIN SCALES - GENERAL
PAINLEVEL_OUTOF10: 3
PAINLEVEL_OUTOF10: 0
PAINLEVEL_OUTOF10: 7
PAINLEVEL_OUTOF10: 0

## 2024-11-06 ASSESSMENT — PAIN DESCRIPTION - ORIENTATION: ORIENTATION: LEFT

## 2024-11-06 ASSESSMENT — PAIN DESCRIPTION - LOCATION: LOCATION: HIP

## 2024-11-06 NOTE — CARE COORDINATION
CM reviewed clinical notes. Currently, O2 at 3 lpm. No home O2. CPAP at night.   ID following for RLE cellulitis. Recommending Linezolid 600 mg Q 12 hrs with EOT 11/14/24.   CUBA contacted Amina with [facility]. Amina accepted patient. CM selected in [facility].   CM contacted patient's spouse to update on acceptance to [facility].  Transition of care to [facility] when medically stable to discharge..    1300 CM informed patient medically stable for discharge by Dr Deshpande. CUBA checked with Amina to check bed availability. Amina reports patient can admit to [facility] 11/7.

## 2024-11-07 VITALS
HEIGHT: 68 IN | WEIGHT: 223.9 LBS | DIASTOLIC BLOOD PRESSURE: 63 MMHG | OXYGEN SATURATION: 99 % | TEMPERATURE: 97.5 F | BODY MASS INDEX: 33.93 KG/M2 | HEART RATE: 83 BPM | SYSTOLIC BLOOD PRESSURE: 103 MMHG | RESPIRATION RATE: 18 BRPM

## 2024-11-07 LAB
ANION GAP SERPL CALC-SCNC: 8 MMOL/L (ref 7–16)
BUN SERPL-MCNC: 29 MG/DL (ref 8–23)
CALCIUM SERPL-MCNC: 9.1 MG/DL (ref 8.8–10.2)
CHLORIDE SERPL-SCNC: 90 MMOL/L (ref 98–107)
CO2 SERPL-SCNC: 33 MMOL/L (ref 20–29)
CREAT SERPL-MCNC: 1.09 MG/DL (ref 0.8–1.3)
GLUCOSE SERPL-MCNC: 129 MG/DL (ref 70–99)
MAGNESIUM SERPL-MCNC: 2.1 MG/DL (ref 1.8–2.4)
POTASSIUM SERPL-SCNC: 4.4 MMOL/L (ref 3.5–5.1)
SODIUM SERPL-SCNC: 131 MMOL/L (ref 136–145)

## 2024-11-07 PROCEDURE — 6370000000 HC RX 637 (ALT 250 FOR IP): Performed by: PHYSICIAN ASSISTANT

## 2024-11-07 PROCEDURE — 97530 THERAPEUTIC ACTIVITIES: CPT

## 2024-11-07 PROCEDURE — 6370000000 HC RX 637 (ALT 250 FOR IP): Performed by: INTERNAL MEDICINE

## 2024-11-07 PROCEDURE — 6370000000 HC RX 637 (ALT 250 FOR IP): Performed by: CASE MANAGER/CARE COORDINATOR

## 2024-11-07 PROCEDURE — 2580000003 HC RX 258: Performed by: PHYSICIAN ASSISTANT

## 2024-11-07 PROCEDURE — 80048 BASIC METABOLIC PNL TOTAL CA: CPT

## 2024-11-07 PROCEDURE — 36415 COLL VENOUS BLD VENIPUNCTURE: CPT

## 2024-11-07 PROCEDURE — 2500000003 HC RX 250 WO HCPCS: Performed by: PHYSICIAN ASSISTANT

## 2024-11-07 PROCEDURE — 6360000002 HC RX W HCPCS: Performed by: INTERNAL MEDICINE

## 2024-11-07 PROCEDURE — 83735 ASSAY OF MAGNESIUM: CPT

## 2024-11-07 RX ORDER — FUROSEMIDE 20 MG/1
40 TABLET ORAL DAILY
Qty: 90 TABLET | Refills: 3 | Status: SHIPPED | OUTPATIENT
Start: 2024-11-07

## 2024-11-07 RX ADMIN — RANOLAZINE 500 MG: 500 TABLET, FILM COATED, EXTENDED RELEASE ORAL at 09:29

## 2024-11-07 RX ADMIN — METOPROLOL SUCCINATE 50 MG: 25 TABLET, EXTENDED RELEASE ORAL at 09:29

## 2024-11-07 RX ADMIN — APIXABAN 5 MG: 5 TABLET, FILM COATED ORAL at 09:29

## 2024-11-07 RX ADMIN — SODIUM CHLORIDE, PRESERVATIVE FREE 10 ML: 5 INJECTION INTRAVENOUS at 09:01

## 2024-11-07 RX ADMIN — MORPHINE SULFATE 2 MG: 2 INJECTION, SOLUTION INTRAMUSCULAR; INTRAVENOUS at 04:33

## 2024-11-07 RX ADMIN — FUROSEMIDE 60 MG: 10 INJECTION, SOLUTION INTRAMUSCULAR; INTRAVENOUS at 09:29

## 2024-11-07 RX ADMIN — BENZONATATE 200 MG: 100 CAPSULE ORAL at 01:40

## 2024-11-07 RX ADMIN — MORPHINE SULFATE 2 MG: 2 INJECTION, SOLUTION INTRAMUSCULAR; INTRAVENOUS at 01:34

## 2024-11-07 RX ADMIN — MORPHINE SULFATE 2 MG: 2 INJECTION, SOLUTION INTRAMUSCULAR; INTRAVENOUS at 09:34

## 2024-11-07 RX ADMIN — ASPIRIN 81 MG 81 MG: 81 TABLET ORAL at 09:29

## 2024-11-07 ASSESSMENT — PAIN DESCRIPTION - ORIENTATION
ORIENTATION: LEFT
ORIENTATION: LEFT;UPPER;LOWER;MID

## 2024-11-07 ASSESSMENT — PAIN DESCRIPTION - LOCATION
LOCATION: LEG;PELVIS;HIP
LOCATION: LEG;HIP
LOCATION: GENERALIZED

## 2024-11-07 ASSESSMENT — PAIN SCALES - GENERAL
PAINLEVEL_OUTOF10: 0
PAINLEVEL_OUTOF10: 8
PAINLEVEL_OUTOF10: 2
PAINLEVEL_OUTOF10: 0
PAINLEVEL_OUTOF10: 0
PAINLEVEL_OUTOF10: 8
PAINLEVEL_OUTOF10: 7

## 2024-11-07 ASSESSMENT — PAIN DESCRIPTION - ONSET
ONSET: GRADUAL
ONSET: ON-GOING

## 2024-11-07 ASSESSMENT — PAIN DESCRIPTION - PAIN TYPE
TYPE: CHRONIC PAIN
TYPE: CHRONIC PAIN

## 2024-11-07 ASSESSMENT — PAIN DESCRIPTION - DESCRIPTORS
DESCRIPTORS: ACHING;DISCOMFORT;THROBBING
DESCRIPTORS: DISCOMFORT;THROBBING;DULL

## 2024-11-07 ASSESSMENT — PAIN DESCRIPTION - FREQUENCY
FREQUENCY: INTERMITTENT
FREQUENCY: CONTINUOUS

## 2024-11-07 NOTE — PLAN OF CARE
Problem: Discharge Planning  Goal: Discharge to home or other facility with appropriate resources  11/6/2024 2053 by Yari Tran RN  Outcome: Progressing  11/6/2024 2049 by Yari Tran RN  Outcome: Progressing  Flowsheets (Taken 11/6/2024 0748 by Linda Gudino RN)  Discharge to home or other facility with appropriate resources:   Identify barriers to discharge with patient and caregiver   Arrange for needed discharge resources and transportation as appropriate   Identify discharge learning needs (meds, wound care, etc)   Refer to discharge planning if patient needs post-hospital services based on physician order or complex needs related to functional status, cognitive ability or social support system     Problem: ABCDS Injury Assessment  Goal: Absence of physical injury  11/6/2024 2053 by Yari Tran RN  Outcome: Progressing  11/6/2024 2049 by Yari Tran RN  Outcome: Progressing     Problem: Chronic Conditions and Co-morbidities  Goal: Patient's chronic conditions and co-morbidity symptoms are monitored and maintained or improved  11/6/2024 2053 by Yari Tran RN  Outcome: Progressing  11/6/2024 2049 by Yari Tran RN  Outcome: Progressing  Flowsheets (Taken 11/6/2024 0748 by Linda Gudino RN)  Care Plan - Patient's Chronic Conditions and Co-Morbidity Symptoms are Monitored and Maintained or Improved:   Monitor and assess patient's chronic conditions and comorbid symptoms for stability, deterioration, or improvement   Update acute care plan with appropriate goals if chronic or comorbid symptoms are exacerbated and prevent overall improvement and discharge   Collaborate with multidisciplinary team to address chronic and comorbid conditions and prevent exacerbation or deterioration     Problem: Pain  Goal: Verbalizes/displays adequate comfort level or baseline comfort level  11/6/2024 2053 by Yari Tran RN  Outcome: Progressing  11/6/2024 2049 by Chelsea 
  Problem: Discharge Planning  Goal: Discharge to home or other facility with appropriate resources  Outcome: Adequate for Discharge  Flowsheets (Taken 11/7/2024 0830 by Madhuri Oakley, RN)  Discharge to home or other facility with appropriate resources: Identify barriers to discharge with patient and caregiver     Problem: ABCDS Injury Assessment  Goal: Absence of physical injury  Outcome: Adequate for Discharge  Flowsheets (Taken 11/7/2024 0830 by Madhuri Oakley, RN)  Absence of Physical Injury: Implement safety measures based on patient assessment     Problem: Chronic Conditions and Co-morbidities  Goal: Patient's chronic conditions and co-morbidity symptoms are monitored and maintained or improved  Outcome: Adequate for Discharge  Flowsheets (Taken 11/7/2024 0830 by Madhuri Oakley, RN)  Care Plan - Patient's Chronic Conditions and Co-Morbidity Symptoms are Monitored and Maintained or Improved: Monitor and assess patient's chronic conditions and comorbid symptoms for stability, deterioration, or improvement     Problem: Pain  Goal: Verbalizes/displays adequate comfort level or baseline comfort level  Outcome: Adequate for Discharge     Problem: Safety - Adult  Goal: Free from fall injury  Outcome: Adequate for Discharge  Flowsheets (Taken 11/7/2024 0830 by Madhuri Oakley, RN)  Free From Fall Injury: Instruct family/caregiver on patient safety     Problem: Skin/Tissue Integrity  Goal: Absence of new skin breakdown  Description: 1.  Monitor for areas of redness and/or skin breakdown  2.  Assess vascular access sites hourly  3.  Every 4-6 hours minimum:  Change oxygen saturation probe site  4.  Every 4-6 hours:  If on nasal continuous positive airway pressure, respiratory therapy assess nares and determine need for appliance change or resting period.  Outcome: Adequate for Discharge     
  Problem: Discharge Planning  Goal: Discharge to home or other facility with appropriate resources  Outcome: Progressing     Problem: ABCDS Injury Assessment  Goal: Absence of physical injury  Outcome: Progressing     Problem: Chronic Conditions and Co-morbidities  Goal: Patient's chronic conditions and co-morbidity symptoms are monitored and maintained or improved  Outcome: Progressing     Problem: Pain  Goal: Verbalizes/displays adequate comfort level or baseline comfort level  Outcome: Progressing     Problem: Safety - Adult  Goal: Free from fall injury  Outcome: Progressing     Problem: Skin/Tissue Integrity  Goal: Absence of new skin breakdown  Description: 1.  Monitor for areas of redness and/or skin breakdown  2.  Assess vascular access sites hourly  3.  Every 4-6 hours minimum:  Change oxygen saturation probe site  4.  Every 4-6 hours:  If on nasal continuous positive airway pressure, respiratory therapy assess nares and determine need for appliance change or resting period.  Outcome: Progressing     
  Problem: Discharge Planning  Goal: Discharge to home or other facility with appropriate resources  Outcome: Progressing     Problem: ABCDS Injury Assessment  Goal: Absence of physical injury  Outcome: Progressing     Problem: Chronic Conditions and Co-morbidities  Goal: Patient's chronic conditions and co-morbidity symptoms are monitored and maintained or improved  Outcome: Progressing     Problem: Pain  Goal: Verbalizes/displays adequate comfort level or baseline comfort level  Outcome: Progressing     Problem: Safety - Adult  Goal: Free from fall injury  Outcome: Progressing     Problem: Skin/Tissue Integrity  Goal: Absence of new skin breakdown  Description: 1.  Monitor for areas of redness and/or skin breakdown  2.  Assess vascular access sites hourly  3.  Every 4-6 hours minimum:  Change oxygen saturation probe site  4.  Every 4-6 hours:  If on nasal continuous positive airway pressure, respiratory therapy assess nares and determine need for appliance change or resting period.  Outcome: Progressing     
  Problem: Discharge Planning  Goal: Discharge to home or other facility with appropriate resources  Outcome: Progressing  Flowsheets (Taken 11/1/2024 2030 by Ace Romeo, RN)  Discharge to home or other facility with appropriate resources:   Identify barriers to discharge with patient and caregiver   Arrange for needed discharge resources and transportation as appropriate   Identify discharge learning needs (meds, wound care, etc)     Problem: ABCDS Injury Assessment  Goal: Absence of physical injury  Outcome: Progressing  Flowsheets (Taken 11/1/2024 2030 by Ace Romeo, RN)  Absence of Physical Injury: Implement safety measures based on patient assessment     
  Problem: Discharge Planning  Goal: Discharge to home or other facility with appropriate resources  Outcome: Progressing  Flowsheets (Taken 11/2/2024 2025)  Discharge to home or other facility with appropriate resources:   Identify barriers to discharge with patient and caregiver   Arrange for needed discharge resources and transportation as appropriate   Identify discharge learning needs (meds, wound care, etc)     Problem: ABCDS Injury Assessment  Goal: Absence of physical injury  Outcome: Progressing  Flowsheets (Taken 11/2/2024 2025)  Absence of Physical Injury: Implement safety measures based on patient assessment     Problem: Chronic Conditions and Co-morbidities  Goal: Patient's chronic conditions and co-morbidity symptoms are monitored and maintained or improved  Outcome: Progressing  Flowsheets (Taken 11/2/2024 2025)  Care Plan - Patient's Chronic Conditions and Co-Morbidity Symptoms are Monitored and Maintained or Improved: Monitor and assess patient's chronic conditions and comorbid symptoms for stability, deterioration, or improvement     Problem: Pain  Goal: Verbalizes/displays adequate comfort level or baseline comfort level  Outcome: Progressing  Flowsheets (Taken 11/2/2024 2025)  Verbalizes/displays adequate comfort level or baseline comfort level:   Encourage patient to monitor pain and request assistance   Assess pain using appropriate pain scale     Problem: Safety - Adult  Goal: Free from fall injury  Outcome: Progressing  Flowsheets (Taken 11/2/2024 2025)  Free From Fall Injury: Instruct family/caregiver on patient safety     Problem: Skin/Tissue Integrity  Goal: Absence of new skin breakdown  Description: 1.  Monitor for areas of redness and/or skin breakdown  2.  Assess vascular access sites hourly  3.  Every 4-6 hours minimum:  Change oxygen saturation probe site  4.  Every 4-6 hours:  If on nasal continuous positive airway pressure, respiratory therapy assess nares and determine need for 
  Problem: Discharge Planning  Goal: Discharge to home or other facility with appropriate resources  Outcome: Progressing  Flowsheets (Taken 11/6/2024 0748 by Linda Gudino, RN)  Discharge to home or other facility with appropriate resources:   Identify barriers to discharge with patient and caregiver   Arrange for needed discharge resources and transportation as appropriate   Identify discharge learning needs (meds, wound care, etc)   Refer to discharge planning if patient needs post-hospital services based on physician order or complex needs related to functional status, cognitive ability or social support system     Problem: ABCDS Injury Assessment  Goal: Absence of physical injury  Outcome: Progressing     Problem: Chronic Conditions and Co-morbidities  Goal: Patient's chronic conditions and co-morbidity symptoms are monitored and maintained or improved  Outcome: Progressing  Flowsheets (Taken 11/6/2024 0748 by Linda Gudino, RN)  Care Plan - Patient's Chronic Conditions and Co-Morbidity Symptoms are Monitored and Maintained or Improved:   Monitor and assess patient's chronic conditions and comorbid symptoms for stability, deterioration, or improvement   Update acute care plan with appropriate goals if chronic or comorbid symptoms are exacerbated and prevent overall improvement and discharge   Collaborate with multidisciplinary team to address chronic and comorbid conditions and prevent exacerbation or deterioration     Problem: Pain  Goal: Verbalizes/displays adequate comfort level or baseline comfort level  Outcome: Progressing     Problem: Safety - Adult  Goal: Free from fall injury  Outcome: Progressing  Flowsheets (Taken 11/6/2024 0748 by Linda Gudino, RN)  Free From Fall Injury: Instruct family/caregiver on patient safety     Problem: Skin/Tissue Integrity  Goal: Absence of new skin breakdown  Description: 1.  Monitor for areas of redness and/or skin breakdown  2.  Assess vascular access sites hourly  3.  
0445: Xa drawn at 0400 has not resulted yet. Lab called to acquire results. Lab called and states the results will be up in about 5 minutes. Unable to follow protocol until Xa results, thus heparin gtt will remain unchanged.     0530: Xa just resulted in chart at >1.1. Heparin paused for 60min and will titrate down to 6units/kg/hr per protocol in MAR. Verified with pharmacy.  
4 Eyes Skin Assessment     NAME:  Matias Mueller  YOB: 1945  MEDICAL RECORD NUMBER:  [id number]    The patient is being assessed for  Admission    I agree that at least one RN has performed a thorough Head to Toe Skin Assessment on the patient. ALL assessment sites listed below have been assessed.      Areas assessed by both nurses:    Head, Face, Ears, Shoulders, Back, Chest, Arms, Elbows, Hands, Sacrum. Buttock, Coccyx, Ischium, Legs. Feet and Heels, and Under Medical Devices         Does the Patient have a Wound? Yes wound(s) were present on assessment. LDA wound assessment was Initiated and completed by RN, pt has pressure wounds on bilateral buttocks and blanchable sacral redness. Pt also has RLE redness due to cellulitis.       Juno Prevention initiated by RN: Yes  Wound Care Orders initiated by RN: Yes    Pressure Injury (Stage 3,4, Unstageable, DTI, NWPT, and Complex wounds) if present, place Wound referral order by RN under : No    New Ostomies, if present place, Ostomy referral order under : No     Nurse 1 eSignature: Electronically signed by Eliseo Huertas RN on 10/30/24 at 1:34 AM EDT    **SHARE this note so that the co-signing nurse can place an eSignature**    Nurse 2 eSignature: Electronically signed by Mai Kidd RN on 10/30/24 at 1:36 AM EDT   
Pt keeps taking off PAP to cough and states that he \"can't sleep with it on\". Pt requested to take of PAP, pt educated on the importance of wearing PAP especially due to his elevated CO2. Pt agreed to wear the PAP as long as he could. Pt provided with melatonin and cough medicine.  
Pt transferred onto unit with a heparin gtt running at 10 units/kg/hr. Per paramedic no heparin bolus was given. Pharmacy verified to restart heparin gtt at 9 units/kg/hr and to give a 4000 unit bolus. Pharmacy also verified to draw Xa \"4-5 hrs from start time\". Heparin gtt was started at 2304. Xa was ordered to be drawn at 0300 10/30/24.  
breakdown  Description: 1.  Monitor for areas of redness and/or skin breakdown  2.  Assess vascular access sites hourly  3.  Every 4-6 hours minimum:  Change oxygen saturation probe site  4.  Every 4-6 hours:  If on nasal continuous positive airway pressure, respiratory therapy assess nares and determine need for appliance change or resting period.  Outcome: Progressing     
period.  Outcome: Progressing

## 2024-11-07 NOTE — CARE COORDINATION
11/07/24 1209   Services At/After Discharge   Mode of Transport at Discharge BLS  ([facility] at 1400)   Confirm Follow Up Transport Family   Condition of Participation: Discharge Planning   The Plan for Transition of Care is related to the following treatment goals: STR at [facility] with Palliative Care following   The Patient and/or Patient Representative was provided with a Choice of Provider? Patient   The Patient and/Or Patient Representative agree with the Discharge Plan? Yes   Freedom of Choice list was provided with basic dialogue that supports the patient's individualized plan of care/goals, treatment preferences, and shares the quality data associated with the providers?  Yes     CM messaged Amina at [facility] for bed availability today. Amina reports bed assignment of room 314.   Discharge order placed. Summary placed in packet along with Covid screen result.   [facility] scheduled at 1400. Patient requested ambulance transport and declined DNR during transport. Patient informed transportation cost may exceed insurance coverage. CM contacted spouse to inform of discharge and transportation scheduling.  Primary nurse calling report. Transition of care to [facility].

## 2024-11-07 NOTE — DISCHARGE SUMMARY
Not detected NOTD     Basic Metabolic Panel    Collection Time: 11/07/24  6:37 AM   Result Value Ref Range    Sodium 131 (L) 136 - 145 mmol/L    Potassium 4.4 3.5 - 5.1 mmol/L    Chloride 90 (L) 98 - 107 mmol/L    CO2 33 (H) 20 - 29 mmol/L    Anion Gap 8 7 - 16 mmol/L    Glucose 129 (H) 70 - 99 mg/dL    BUN 29 (H) 8 - 23 MG/DL    Creatinine 1.09 0.80 - 1.30 MG/DL    Est, Glom Filt Rate 68 >60 ml/min/1.73m2    Calcium 9.1 8.8 - 10.2 MG/DL   Magnesium    Collection Time: 11/07/24  6:37 AM   Result Value Ref Range    Magnesium 2.1 1.8 - 2.4 mg/dL         Patient Instructions:      Medication List        CONTINUE taking these medications      apixaban 5 MG Tabs tablet  Commonly known as: ELIQUIS  Take 1 tablet by mouth 2 times daily     aspirin 81 MG chewable tablet  Take 1 tablet by mouth daily     BACTRIM PO     Evolocumab 140 MG/ML Soaj  Inject 140 mg into the skin every 14 days     fluticasone 50 MCG/ACT nasal spray  Commonly known as: FLONASE     furosemide 20 MG tablet  Commonly known as: LASIX  Take 1 tablet by mouth daily     metoprolol tartrate 50 MG tablet  Commonly known as: LOPRESSOR  Take 1 tablet by mouth 2 times daily     nitroGLYCERIN 0.4 MG SL tablet  Commonly known as: NITROSTAT  Place 1 tablet under the tongue every 5 minutes as needed for Chest pain     ranolazine 500 MG extended release tablet  Commonly known as: RANEXA  Take 1 tablet by mouth 2 times daily                Signed:  ALICIA Evans  11/7/2024  11:20 AM

## 2024-11-07 NOTE — PROGRESS NOTES
[facility] CARDIOLOGY PROGRESS NOTE           11/1/2024 11:53 AM    Admit Date: 10/29/2024      Subjective:   Patient reports some intermittent cough with sputum production overnight.  Denies chest pain or chest pressure, chest tightness.  No abdominal pain nausea or vomiting.  Lower extremity swelling with right lower extremity redness continues.    ROS:  Cardiovascular:  As noted above    Objective:      Vitals:    11/01/24 0319 11/01/24 0747 11/01/24 1000 11/01/24 1024   BP: (!) 92/56 (!) 83/75 98/70    Pulse: 95 88  88   Resp: 17 18     Temp: 98.1 °F (36.7 °C) 98.1 °F (36.7 °C)     TempSrc: Oral Oral     SpO2: 95% 96%     Weight:       Height:           Physical Exam:  General-No Acute Distress, awake, alert,  Neck- supple, no JVD  CV- regular rate and rhythm no MRG  Lung- clear bilaterally  Abd- soft, nontender, nondistended  Ext-erythema and warmth right lower extremity, mild leg edema bilaterally.  Skin- warm and dry    Data Review:   Recent Labs     10/30/24  0021 10/30/24  0359 10/30/24  1106 10/30/24  1647 10/31/24  0345 11/01/24  0625   NA  --    < > 130*   < > 127* 128*   K  --    < > 5.3*   < > 4.4 4.3   MG  --   --  1.9  --   --   --    BUN  --    < > 33*   < > 38* 37*   WBC  --    < >  --   --  12.5* 9.4   HGB  --    < >  --   --  11.4* 11.3*   HCT  --    < >  --   --  33.9* 33.5*   PLT  --    < >  --   --  153 158   INR 2.0  --   --   --   --   --     < > = values in this interval not displayed.       Assessment/Plan:   Active Hospital Problems    Coronary artery disease of native artery of native heart with stable angina pectoris (HCC)    NSTEMI (non-ST elevated myocardial infarction) (HCC)  -Continue medical therapy at this time, he is angina free.  - Cath films previously reviewed by interventional cardiology, thought to benefit most from medical therapy.  Will continue heparin drip at this time in place of Eliquis.  -Metoprolol held due to low range blood 
                         [facility] CARDIOLOGY PROGRESS NOTE           10/31/2024 11:11 AM    Admit Date: 10/29/2024      Subjective:   Patient reports no chest pain, abdominal pain, shortness of breath, cough, wheeze.  Reports continued lower extremity swelling, redness of the right lower extremity.  Denies fevers or chills.  No other complaints at this time.    ROS:  Cardiovascular:  As noted above    Objective:      Vitals:    10/31/24 0500 10/31/24 0600 10/31/24 0736 10/31/24 0908   BP: 101/70 101/60 97/69    Pulse: 89 96 97 (!) 102   Resp:   19    Temp:   97.5 °F (36.4 °C)    TempSrc:   Oral    SpO2: 99% 100% 98%    Weight:       Height:           Physical Exam:  General-No Acute Distress, awake, alert, lying flat  Neck- supple, no JVD  CV- regular rate and rhythm no MRG  Lung- clear bilaterally  Abd- soft, nontender, nondistended  Ext-1+ edema bilaterally.  Skin- warm and dry erythema right lower extremity    Data Review:   Recent Labs     10/30/24  0021 10/30/24  0359 10/30/24  0359 10/30/24  1106 10/30/24  1647 10/31/24  0345   NA  --  130*   < > 130* 130* 127*   K  --  4.7   < > 5.3* 4.8 4.4   MG  --   --   --  1.9  --   --    BUN  --  28*   < > 33* 36* 38*   WBC  --  15.3*  --   --   --  12.5*   HGB  --  13.1*  --   --   --  11.4*   HCT  --  38.3*  --   --   --  33.9*   PLT  --  178  --   --   --  153   INR 2.0  --   --   --   --   --     < > = values in this interval not displayed.       Assessment/Plan:     Active Hospital Problems    Coronary artery disease of native artery of native heart with stable angina pectoris (HCC)    NSTEMI (non-ST elevated myocardial infarction) (HCC)  -Continue medical therapy at this time, he is angina free.  - Cath films previously reviewed by interventional cardiology, thought to benefit most from medical therapy.  Will continue heparin drip at this time in place of Eliquis.  Metoprolol as tolerated by heart rate and blood pressure, Ranexa.   -Intolerant of statins.  -Should 
                 [facility] CARDIOLOGY PROGRESS NOTE           11/5/2024 7:27 AM    Admit Date: 10/29/2024      Subjective:   Patient denies any chest pain.  He feels weak.  Plans for rehab noted.  No fever or chills.  Erythema in his right leg is improving.  Remains edematous.    ROS:  Cardiovascular:  As noted above    Objective:      Vitals:    11/05/24 0357 11/05/24 0416 11/05/24 0500 11/05/24 0600   BP: 96/61 104/64 106/60 116/75   Pulse: 92 88 89 92   Resp: 18      Temp: 97.2 °F (36.2 °C)      TempSrc: Oral      SpO2: 95%      Weight: 100.6 kg (221 lb 12.5 oz)      Height:           Physical Exam:  General-No Acute Distress  Neck- supple, no JVD  CV- regular rate and rhythm no MRG  Lung- clear bilaterally  Abd- soft, nontender, nondistended  Ext-2+ edema.  Erythema noted in the right leg consistent with cellulitis.  Skin- warm and dry      Data Review:   Recent Labs     11/04/24  0343 11/03/24  0331 11/02/24  0436   WBC 6.9 6.7 7.2   HGB 12.2* 11.8* 11.2*   HCT 37.0* 35.0* 32.5*   MCV 97.4 96.2 93.7    219 176       Recent Labs     11/04/24  0343 10/31/24  0345 10/30/24  1647   *   < > 130*   K 4.2   < > 4.8   CL 94*   < > 97*   CO2 28   < > 22   BUN 30*   < > 36*   CREATININE 1.10   < > 1.78*   GLUCOSE 139*   < > 133*   CALCIUM 9.0   < > 8.7*   BILITOT  --   --  0.7   ALKPHOS  --   --  55   AST  --   --  180*   ALT  --   --  33   LABGLOM 67   < > 38*   GLOB  --   --  3.8*    < > = values in this interval not displayed.       No results for input(s): \"CKTOTAL\", \"CKMB\", \"CKMBINDEX\", \"DDIMER\", \"TROPONINI\" in the last 720 hours.    Echo (10/30/24):  Left Ventricle: Severely reduced left ventricular systolic function with a visually estimated EF of 25 - 30%. Left ventricle is mildly dilated. Normal wall thickness. Severe global hypokinesis present. Indeterminate diastolic function due to arrhythmia.    Right Ventricle: Moderately reduced systolic function.    Mitral Valve: Mild regurgitation.    
                 [facility] CARDIOLOGY PROGRESS NOTE           11/3/2024 8:16 AM    Admit Date: 10/29/2024      Subjective:   Patient denies any chest pain.  Tolerated physical therapy yesterday.  Feels weak.  States that he basely came in the hospital because he could not stand.  Hemodynamics are stable.  Back on Eliquis.  Renal function has improved.        ROS:  Cardiovascular:  As noted above    Objective:      Vitals:    11/02/24 2344 11/03/24 0425 11/03/24 0630 11/03/24 0744   BP: 122/77 119/73  112/78   Pulse: 95 95  (!) 204   Resp: 20 18     Temp: 97.3 °F (36.3 °C) 97.7 °F (36.5 °C)  98.2 °F (36.8 °C)   TempSrc: Oral Oral     SpO2: 98% 98%  96%   Weight:   104.4 kg (230 lb 1.6 oz)    Height:           Physical Exam:  General-No Acute Distress  Neck- supple, no JVD  CV- regular rate and rhythm no MRG  Lung- clear bilaterally  Abd- soft, nontender, nondistended  Ext-2+ edema.  Erythema noted in the right leg consistent with cellulitis.  Skin- warm and dry      Data Review:   Recent Labs     11/03/24  0331 11/02/24  0436 11/01/24  0625   WBC 6.7 7.2 9.4   HGB 11.8* 11.2* 11.3*   HCT 35.0* 32.5* 33.5*   MCV 96.2 93.7 94.9    176 158       Recent Labs     11/03/24  0331 10/31/24  0345 10/30/24  1647   *   < > 130*   K 4.2   < > 4.8   CL 95*   < > 97*   CO2 25   < > 22   BUN 32*   < > 36*   CREATININE 1.12   < > 1.78*   GLUCOSE 114*   < > 133*   CALCIUM 8.6*   < > 8.7*   BILITOT  --   --  0.7   ALKPHOS  --   --  55   AST  --   --  180*   ALT  --   --  33   LABGLOM 66   < > 38*   GLOB  --   --  3.8*    < > = values in this interval not displayed.       No results for input(s): \"CKTOTAL\", \"CKMB\", \"CKMBINDEX\", \"DDIMER\", \"TROPONINI\" in the last 720 hours.    Echo (10/30/24):  Left Ventricle: Severely reduced left ventricular systolic function with a visually estimated EF of 25 - 30%. Left ventricle is mildly dilated. Normal wall thickness. Severe global hypokinesis present. Indeterminate diastolic function 
                 [facility] CARDIOLOGY PROGRESS NOTE           10/30/2024 9:26 AM    Admit Date: 10/29/2024      Subjective:   Patient admitted overnight with chest pain and diffuse ST depression.  Currently chest pain-free.  On intravenous heparin and nitroglycerin.  Was tachycardic at presentation.  Appears to have significant right lower extremity cellulitis.  He had a similar admission at [facility] in May where he presented with cellulitis and had inferior ST elevation was taken emergently to catheterization lab at which time he was found only to have a patent KOHLER to LAD.  His circumflex and RCA were occluded and filled by collaterals.  There is a diagonal vessel which was patent but highly calcified left main.  Attempted PCI was unsuccessful due to inability to cross the lesion with a balloon.  Review of films showed the left main and diagonal lesion had been the same since 2017.    ROS:  Cardiovascular:  As noted above    Objective:      Vitals:    10/30/24 0500 10/30/24 0600 10/30/24 0744 10/30/24 0815   BP: 91/63 (!) 91/57 (!) 92/59 93/62   Pulse: (!) 115 (!) 118 (!) 109 (!) 112   Resp:   18    Temp:   98.6 °F (37 °C)    TempSrc:       SpO2:   93%    Weight:       Height:           Physical Exam:  General-No Acute Distress  Neck- supple, no JVD  CV-mildly tachycardic.  Lung- clear bilaterally  Abd- soft, nontender, nondistended  Ext- no edema bilaterally.  Skin- warm and dry      Data Review:   Recent Labs     10/30/24  0359   WBC 15.3*   HGB 13.1*   HCT 38.3*   MCV 95.0          Recent Labs     10/30/24  0359   *   K 4.7   CL 96*   CO2 19*   BUN 28*   CREATININE 1.57*   GLUCOSE 123*   CALCIUM 8.5*   LABGLOM 44*       No results for input(s): \"CKTOTAL\", \"CKMB\", \"CKMBINDEX\", \"DDIMER\", \"TROPONINI\" in the last 720 hours.           Assessment/Plan:     Active Hospital Problems    *NSTEMI (non-ST elevated myocardial infarction) (HCC)  Complex coronary artery disease.  Basically patient has a LIMA 
              [facility] [[email or web address]] CARDIOLOGY PROGRESS NOTE           11/4/2024 5:13 AM    Admit Date: 10/29/2024      Subjective:   No cp or sob    Objective:      Vitals:    11/03/24 1612 11/03/24 2000 11/04/24 0003 11/04/24 0417   BP: 110/75 127/80 113/61 117/68   Pulse:  98 90 90   Resp: 18 18 18 18   Temp: 97.5 °F (36.4 °C) 97.7 °F (36.5 °C) 97.3 °F (36.3 °C) 98.2 °F (36.8 °C)   TempSrc: Oral Oral Oral Oral   SpO2: 100% 98% 100% 97%   Weight:       Height:           Physical Exam:  General-No Acute Distress  Neck- supple, no JVD  CV- regular rate and rhythm no MRG  Lung- clear bilaterally  Abd- soft, nontender, nondistended  Ext- no edema bilaterally.  Skin- warm and dry    Data Review:   Recent Labs     11/03/24  0331 11/04/24  0343   * 134*   K 4.2 4.2   BUN 32* 30*   WBC 6.7 6.9   HGB 11.8* 12.2*   HCT 35.0* 37.0*    201       Assessment/Plan:     RLE cellulitis, recurrent  2.   Lymphedema, chronic  3.   Ischemic cardiomyopathy, declining HFrEF, recurrent NSTEMI, no revascularization option             4.   Prostate cancer, metastatic  5.   Bridger/Ckd  6.   Hx DVT  7.   Hx Pad  ///  Needs better HR control  Inc Toprol to 100  Consider Corlanor if HR stays up        CANDELARIO FERRER MD  11/4/2024 5:13 AM   
   10/31/24 6525   NIV Type   $NIV $Daily Charge   NIV Started/Stopped On   Equipment Type V60   Mode CPAP   Mask Type Full face mask   Mask Size Medium   Assessment   Respirations 24   Comfort Level Good   Using Accessory Muscles No   Mask Compliance Good   Skin Assessment Clean, dry, & intact   Settings/Measurements   PIP Observed 17 cm H20   CPAP/EPAP 15 cmH2O   Vt (Measured) 770 mL   O2 Flow Rate (L/min) 3 L/min   FiO2  40 %   Minute Volume (L/min) 18.7 Liters   Mask Leak (lpm) 32 lpm   Patient's Home Machine No   Alarm Settings   Alarms On Y     Patient placed on V60. Connected to Continuous Pulse Oximetry utilizing Vital Sync. Alarms are activated . Documentation completed.   
  11/6/2024 1:44 PM    Admit Date: 10/29/2024    Admit Diagnosis: NSTEMI (non-ST elevated myocardial infarction) ([facility]) [I21.4]      Subjective:   Denies chest pain or shortness of breath      Objective:    BP 91/60   Pulse 83   Temp 98.1 °F (36.7 °C) (Oral)   Resp 17   Ht 1.727 m (5' 7.99\")   Wt 101.3 kg (223 lb 4.8 oz)   SpO2 97%   BMI 33.96 kg/m²     Physical Exam:  General-Well Developed, Well Nourished, No Acute Distress, Alert & Oriented x 3, appropriate mood.  Neck- supple, no JVD  CV- regular rate and rhythm no MRG  Lung- clear bilaterally  Abd- soft, nontender, nondistended  Ext-2+ edema bilaterally.  Skin- warm and dry        Data Review:   Recent Labs     11/04/24  0343 11/06/24  0513   * 132*   K 4.2 4.6   BUN 30* 28*   WBC 6.9  --    HGB 12.2*  --    HCT 37.0*  --      --        Assessment/Plan:     Active Hospital Problems    NSTEMI (non-ST elevated myocardial infarction) ([facility]) patient has severe nonrevascularizable CAD and when he becomes ill will develop demand ischemia.  Blood pressure and heart rate are now stable and patient is not having angina.  To rehab when bed available      Chronic renal disease, stage 3, moderately decreased glomerular filtration rate between 30-59 mL/min/1.73 square meter (HCC)      Cellulitis      Chronic systolic (congestive) heart failure ([facility]) with lower extremity edema and cellulitis will continue IV Lasix 1 more day.  Creatinine stable.  Will check a.m. labs      Prostate cancer metastatic to bone (HCC)      Obesity (BMI 30-39.9)      Coronary artery disease of native artery of native heart with stable angina pectoris (HCC)      HTN (hypertension) blood pressure is a little low.  Will decrease Toprol to 50 mg        
 offered a visit to patient. Patient accepted prayer and expressed gratitude.  
 offered a visit to patient. Patient accepted prayer for healing and expressed gratitude.  
ACUTE PHYSICAL THERAPY GOALS:   (Developed with and agreed upon by patient and/or caregiver.)  Pt will perform bed mobility Min (A) c inc time, cueing and use of rails as needed in 7 therapy sessions.  Pt will perform sit-to-stand/ stand-to-sit transfers Min (A) c use of LRAD/external supports as needed and no LOB or miss-steps in 7 therapy sessions.  Pt will ambulate 125 ft CG(A) with use of LRAD, no LOB or miss-steps and breaks as needed in 7 therapy sessions.  Pt will perform standing dynamic balance activities with minimal postural sway in 7 therapy sessions.  Pt will tolerate multiple sets and reps of BLE exercises in 7 therapy sessions.      PHYSICAL THERAPY Initial Assessment, Daily Note, and AM  (Link to Caseload Tracking: PT Visit Days : 1  Acknowledge Orders  Time In/Out  PT Charge Capture  Rehab Caseload Tracker    FALL RISK    Matias Mueller is a 79 y.o. male   PRIMARY DIAGNOSIS: NSTEMI (non-ST elevated myocardial infarction) ([facility])  NSTEMI (non-ST elevated myocardial infarction) ([facility]) [I21.4]       Reason for Referral: Generalized Muscle Weakness (M62.81)  Difficulty in walking, Not elsewhere classified (R26.2)  Other abnormalities of gait and mobility (R26.89)  Inpatient: Payor: MEDICARE / Plan: MEDICARE PART A AND B / Product Type: *No Product type* /     ASSESSMENT:     REHAB RECOMMENDATIONS:   Recommendation to date pending progress:  Setting:  Short-term Rehab    Equipment:    To Be Determined     ASSESSMENT:  Mr. Mueller Is a 79 y.o. male presenting to PT after being admitted on 10/29/24 for NSTEMI associated with worsening weakness and declining mobility status over previous week(s). At time of initial evaluation, pt presents below baseline LOF with deficits in bed mobility, strength, transfers, balance, gait and activity tolerance limiting his overall functional mobility. Today, pt performed all mobility with Mod (A), inc time and cueing while requiring additional use of RW/gait belt for 
ACUTE PHYSICAL THERAPY GOALS:   (Developed with and agreed upon by patient and/or caregiver.)  Pt will perform bed mobility Min (A) c inc time, cueing and use of rails as needed in 7 therapy sessions.  Pt will perform sit-to-stand/ stand-to-sit transfers Min (A) c use of LRAD/external supports as needed and no LOB or miss-steps in 7 therapy sessions.  Pt will ambulate 125 ft CG(A) with use of LRAD, no LOB or miss-steps and breaks as needed in 7 therapy sessions.  Pt will perform standing dynamic balance activities with minimal postural sway in 7 therapy sessions.  Pt will tolerate multiple sets and reps of BLE exercises in 7 therapy sessions.    PHYSICAL THERAPY: Daily Note AM   (Link to Caseload Tracking: PT Visit Days : 3  Time In/Out PT Charge Capture  Rehab Caseload Tracker  Orders    Matias Mueller is a 79 y.o. male   PRIMARY DIAGNOSIS: NSTEMI (non-ST elevated myocardial infarction) (HCC)  NSTEMI (non-ST elevated myocardial infarction) (HCC) [I21.4]       Inpatient: Payor: MEDICARE / Plan: MEDICARE PART A AND B / Product Type: *No Product type* /     ASSESSMENT:     REHAB RECOMMENDATIONS:   Recommendation to date pending progress:  Setting:  Short-term Rehab    Equipment:    To Be Determined     ASSESSMENT:  Mr. Mueller is a 79 y.o. male presenting to PT after being admitted on 10/29/24 for NSTEMI associated with worsening weakness and declining mobility. At time of PT initial treatment pt found reclined in bed and participated with therapeutic exercises listed below. Pt able to perform long sitting to sit edge of bed with Mod A with increased time and assistance from the bed rails. Sitting edge of bed x 12 min while patient participated with sitting balance activities and LE's strengthening exercises to assist with OOB activities. Pt then participated with sit to standing from elevated bed x 2 reps with CG A/Min A with cues to look up unsteady of looking to the floor.  Pt able to ambulate 12 feet with 
ACUTE PHYSICAL THERAPY GOALS:   (Developed with and agreed upon by patient and/or caregiver.)  Pt will perform bed mobility Min (A) c inc time, cueing and use of rails as needed in 7 therapy sessions.  Pt will perform sit-to-stand/ stand-to-sit transfers Min (A) c use of LRAD/external supports as needed and no LOB or miss-steps in 7 therapy sessions.  Pt will ambulate 125 ft CG(A) with use of LRAD, no LOB or miss-steps and breaks as needed in 7 therapy sessions.  Pt will perform standing dynamic balance activities with minimal postural sway in 7 therapy sessions.  Pt will tolerate multiple sets and reps of BLE exercises in 7 therapy sessions.    PHYSICAL THERAPY: Daily Note AM   (Link to Caseload Tracking: PT Visit Days : 4  Time In/Out PT Charge Capture  Rehab Caseload Tracker  Orders    Matias Mueller is a 79 y.o. male   PRIMARY DIAGNOSIS: NSTEMI (non-ST elevated myocardial infarction) (HCC)  NSTEMI (non-ST elevated myocardial infarction) (HCC) [I21.4]       Inpatient: Payor: MEDICARE / Plan: MEDICARE PART A AND B / Product Type: *No Product type* /     ASSESSMENT:     REHAB RECOMMENDATIONS:   Recommendation to date pending progress:  Setting:  Short-term Rehab    Equipment:    To Be Determined     ASSESSMENT:  Mr. Mueller is a 79 y.o. male presenting to PT after being admitted on 10/29/24 for NSTEMI associated with worsening weakness and declining mobility. Patient was supine upon contact and agreeable to PT. Patient performed supine to sit and transfer to standing with mod assist, additional time, and cues for technique/hand placement. Once standing patient requested to use the BSC. Patient ambulated 4' to BSC then 8' to recliner chair with rolling walker, min-mod assist for balance and cues for sequencing with rolling walker. Patient transferred on and off BSC with mod assist and cues for hand placement/improved technique. Once seated in recliner chair patient participated in LE exercises to BLE's with 
ACUTE PHYSICAL THERAPY GOALS:   (Developed with and agreed upon by patient and/or caregiver.)  Pt will perform bed mobility Min (A) c inc time, cueing and use of rails as needed in 7 therapy sessions.  Pt will perform sit-to-stand/ stand-to-sit transfers Min (A) c use of LRAD/external supports as needed and no LOB or miss-steps in 7 therapy sessions.  Pt will ambulate 125 ft CG(A) with use of LRAD, no LOB or miss-steps and breaks as needed in 7 therapy sessions.  Pt will perform standing dynamic balance activities with minimal postural sway in 7 therapy sessions.  Pt will tolerate multiple sets and reps of BLE exercises in 7 therapy sessions.    PHYSICAL THERAPY: Daily Note AM   (Link to Caseload Tracking: PT Visit Days : 5  Time In/Out PT Charge Capture  Rehab Caseload Tracker  Orders    Matias Mueller is a 79 y.o. male   PRIMARY DIAGNOSIS: NSTEMI (non-ST elevated myocardial infarction) (HCC)  NSTEMI (non-ST elevated myocardial infarction) (HCC) [I21.4]       Inpatient: Payor: MEDICARE / Plan: MEDICARE PART A AND B / Product Type: *No Product type* /     ASSESSMENT:     REHAB RECOMMENDATIONS:   Recommendation to date pending progress:  Setting:  Short-term Rehab    Equipment:    To Be Determined     ASSESSMENT:  Mr. Mueller is a 79 y.o. male presenting to PT after being admitted on 10/29/24 for NSTEMI associated with worsening weakness and declining mobility. Patient was supine upon contact and agreeable to PT. Patient performed supine to sit with mod assist, additional time, and cues for technique. Patient then transferred to standing with min assist and cues for technique/hand placement. Once standing ambulated 25' with rolling walker, min assist for balance and cues for sequencing with rolling walker. Patient returned to recliner chair where he participated in LE exercises to BLE's with cues for improved quality of exercise. Slow, steady progress towards PT goals. Will continue PT efforts. 
Date of Outreach Update:  Matias Mueller was seen and assessed.         Vitals:    10/31/24 0300 10/31/24 0449 10/31/24 0500 10/31/24 0600   BP: 90/65 97/66 101/70 101/60   Pulse:  87 89 96   Resp:  24     Temp:  98.6 °F (37 °C)     TempSrc:       SpO2:  99% 99% 100%   Weight:  104.3 kg (230 lb)     Height:          AM labs and notes reviewed. No acute needs at this time. Pt still with marginal Bps. Several conversations had with pt and family in preporation of what to expect If pt were to further decline during this admission. Pt in good spirts this morning on 6L NC says that he feels much better. Pt conversant asking to work with PT/OT and would like to try and get up to the BSC/ says he has not had a BM since admission. Pt reviewed with primary RN. RN made aware of pt requests.     Previous Outreach assessment has been reviewed.  There have been no significant clinical changes since the completion of the last dated Outreach assessment.    Will continue to follow up per outreach protocol.    Signed By:   Rigo Meyer RN    October 31, 2024 7:16 AM     
Date of Outreach Update:  Matias Mueller was seen and assessed.     Vitals:    10/31/24 0600 10/31/24 0736 10/31/24 0908 10/31/24 1151   BP: 101/60 97/69  94/63   Pulse: 96 97 (!) 102 (!) 104   Resp:  19  18   Temp:  97.5 °F (36.4 °C)  97.5 °F (36.4 °C)   TempSrc:  Oral  Oral   SpO2: 100% 98%  95%   Weight:       Height:          Previous Outreach assessment has been reviewed.  There have been no significant clinical changes since the completion of the last dated Outreach assessment.    Will continue to follow up per outreach protocol.    Signed By:   Rigo Meyer RN    October 31, 2024 2:42 PM     
Patient declined to wear CPAP for the night, patient is on 3 L with an O2 saturation of 96%, no distress noted.  
Patient off V60 due to feeling nauseated. Charge nurse notified to notify primary nurse. No respiratory distress is noted.          11/01/24 0145   Oxygen Therapy/Pulse Ox   O2 Therapy Oxygen   $Oxygen $Daily Charge   O2 Flow Rate (L/min) 3 L/min   Respirations 20        
RN went to check on patient. He stated that he was starting to feel SOB. RN checked blood pressure and oxygen. BP resulted as 64/52 and oxygen was 90% on RA. RN placed 4L NC on patient. NP notified- stop nitro gtt, patient HOB lowered, labs ordered and EKG was completed.       Last BP at 1055 resulted as 81/60. RN will continue to monitor.       1740:  spoke with EVELIA Payan about ordering more fluids for sepsis and hypotension. Patient work of breathing as become worse. NP said to hold off on giving him more fluid. Last BP was 101/69. Will continue to monitor.   
Report given to Rosalie RN.  All questions answered.  Pt will be transported by EMS to facility  
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     
bedside untouched.  Endorses nausea as main barrier to PO intake at this time. Offered patient Glucerna to supplement intake, pt agreed. Provided serving to patient at time of RD assessment. Reviewed patient with RN, Rafael.     Current Nutrition Therapies:  ADULT DIET; Regular; 4 carb choices (60 gm/meal)  ORAL HYDRATION; Water; 300 ml (10 oz)    Current Intake:   Average Meal Intake: 26-50% Average Supplements Intake: None Ordered      Anthropometric Measures:  Height: 172.7 cm (5' 7.99\")  Current Body Wt: 104.3 kg (229 lb 15 oz) (10/31), Weight source: Bed scale  BMI: 35, Obese Class 2 (BMI 35.0 -39.9)  Admission Body Weight: 104.5 kg (230 lb 6.1 oz) (10/29)  Ideal Body Weight (Kg) (Calculated): 70 kg (154 lbs), 149.3 %  BMI Category Obese Class 2 (BMI 35.0 -39.9)    Estimated Daily Nutrient Needs:  Energy (kcal/day): 2304-6701 (22-28 kcal/kg) (Kcal/kg Weight used: 70 kg Ideal  Protein (g/day): 70-88 (1-1.25 g/kg) Weight Used: (Ideal) 70 kg  Fluid (ml/day):   (1 ml/kcal)    Nutrition Diagnosis:   Inadequate oral intake related to  (poor appetite) as evidenced by  (nausea, average intake of 25-50% of meals, poor intake pta)    Nutrition Interventions:   Food and/or Nutrient Delivery: Continue Current Diet, Start Oral Nutrition Supplement     Coordination of Nutrition Care: Continue to monitor while inpatient      Goals:      Active Goal: Meet at least 75% of estimated needs, by next RD assessment       Nutrition Monitoring and Evaluation:      Food/Nutrient Intake Outcomes: Food and Nutrient Intake, Supplement Intake  Physical Signs/Symptoms Outcomes: Biochemical Data, Meal Time Behavior, Weight, GI Status    Discharge Planning:    Too soon to determine    Fransisca Martinez RD      
bedside.   Laying in bed. Currently on 6L O2 NC with saturation of 98%. I was able to wean him down to 2L with saturation maintaining at 94-96%.    Denies any chest pain or palpitations.  No acute complaints at this time.   Assessment & Plan:   Severe sepsis due to right leg cellulitis  Met criteria with fever, leukocytosis, and tachycardia.  A probable source with right leg cellulitis.  A similar episode in May on the left leg.  Was treated with IV antibiotics and then was discharged with Zyvox.  He also received Bactrim outpatient afterward.  - ID appreciated  - continue with linezolid and cefazolin.  - no additional IVF given concern for fluid overload  - f/u BCx  - Medicine team will sign off as ID is now onboard and can manage ongoing cellulitis/sepsis.    NSTEMI  CAD s/p CABG  HFmrEF  -Managed by the primary  -on heparin gtt      JOANNE  Cr peaked at 1.78 yesterday. Now down to 1.62. Baseline ~1.0 to 1.5  - monitor closely  - encouraged PO intake. Oral hydration therapy ordered. Due to a nationwide shortage of IV fluids resulting from a natural disaster, the hospital system is facing a critical depletion of IV fluid supplies.  As a result, IV fluids are being reserved for critical use only. Accordingly, the patient received oral rehydration therapy as the first-line treatment.     Hyperkalemia  -resolved     Hx of DVT  -likely provoked in the setting of prostate cancer  - hold home eliquis. 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  However, metastasis found on September 2023    Class II obesity  -Complicates all aspects of care  -Will encourage healthy diet after sepsis is resolved     Anticipated Discharge Arrangements:   Defer to primary team    PT/OT evals ordered?  Defer to primary team  Diet:  ADULT DIET; Regular; 4 carb choices (60 gm/meal)  VTE prophylaxis: Heparin  Code status: 
improving, and reports being able to eat a little bit more. Does still c/o early satiety. Is feeling a lot better since having a BM overnight. States he was able to eat a little of dinner last night, but did struggle with the meat. Declines any texture modifications - states that the last year he just lost his desire to eat meat. Was able to eat a good bit of breakfast this AM - biscuits w/ gravy, cuevas, fruit, and some eggs. He has intermittently been drinking the Glucerna shakes when he isn't full from meals. Encouraged him to keep them to drink between meals if he's ever hungry. No other complaints for me today.     Current Nutrition Therapies:  ADULT DIET; Regular; 4 carb choices (60 gm/meal)  ORAL HYDRATION; Water; 300 ml (10 oz)  ADULT ORAL NUTRITION SUPPLEMENT; Breakfast, Lunch, Dinner; Diabetic Oral Supplement    Current Intake:   Average Meal Intake: 26-50% Average Supplements Intake: 26-50%      Anthropometric Measures:  Height: 172.7 cm (5' 7.99\")  Current Body Wt: 100.6 kg (221 lb 12.5 oz) (11/5), Weight source: Bed scale  BMI: 33.7, Obese Class 1 (BMI 30.0-34.9)  Admission Body Weight: 104.5 kg (230 lb 6.1 oz) (10/29)  Ideal Body Weight (Kg) (Calculated): 70 kg (154 lbs), 149.3 %  BMI Category Obese Class 1 (BMI 30.0-34.9)    Estimated Daily Nutrient Needs:  Energy (kcal/day): 0364-7521 (22-28 kcal/kg) (Kcal/kg Weight used: 70 kg Ideal  Protein (g/day): 70-88 (1-1.25 g/kg) Weight Used: (Ideal) 70 kg  Fluid (ml/day):   (1 ml/kcal)    Nutrition Diagnosis:   Inadequate oral intake related to early satiety, decreased appetite as evidenced by intake 26-50%    Nutrition Interventions:   Food and/or Nutrient Delivery: Continue Current Diet, Continue Oral Nutrition Supplement     Coordination of Nutrition Care: Continue to monitor while inpatient      Goals:   Previous Goal Met: Progressing toward Goal(s)  Active Goal: Meet at least 75% of estimated needs, by next RD assessment       Nutrition Monitoring and 
Hematocrit 33.5 (L) 41.1 - 50.3 %    MCV 94.9 82 - 102 FL    MCH 32.0 26.1 - 32.9 PG    MCHC 33.7 31.4 - 35.0 g/dL    RDW 13.7 11.9 - 14.6 %    Platelets 158 150 - 450 K/uL    MPV 8.7 (L) 9.4 - 12.3 FL    nRBC 0.00 0.0 - 0.2 K/uL   Basic Metabolic Panel    Collection Time: 11/01/24  6:25 AM   Result Value Ref Range    Sodium 128 (L) 136 - 145 mmol/L    Potassium 4.3 3.5 - 5.1 mmol/L    Chloride 96 (L) 98 - 107 mmol/L    CO2 23 20 - 29 mmol/L    Anion Gap 10 7 - 16 mmol/L    Glucose 139 (H) 70 - 99 mg/dL    BUN 37 (H) 8 - 23 MG/DL    Creatinine 1.45 (H) 0.80 - 1.30 MG/DL    Est, Glom Filt Rate 48 (L) >60 ml/min/1.73m2    Calcium 8.2 (L) 8.8 - 10.2 MG/DL        Microbiology:  Reviewed    Studies:  Reviewed    Signed By: Vi Dixon, APRN - CNP     November 1, 2024                
MD    
[] [] [] []    Bed to Chair [] [] [] [] [] [x] [] [] [] [] []    Stand to Sit [] [] [] [] [] [x] [] [] [] [] []     [] [] [] [] [] [] [] [] [] [] []    I=Independent, Mod I=Modified Independent, S=Supervision, SBA=Standby Assistance, CGA=Contact Guard Assistance,   Min=Minimal Assistance, Mod=Moderate Assistance, Max=Maximal Assistance, Total=Total Assistance, NT=Not Tested    BALANCE: Good Fair+ Fair Fair- Poor NT Comments   Sitting Static [] [x] [] [] [] []    Sitting Dynamic [] [] [x] [] [] []              Standing Static [] [] [] [] [] []    Standing Dynamic [] [] [] [] [] []      GAIT: I Mod I S SBA CGA Min Mod Max Total  NT x2 Comments:   Level of Assistance [] [] [] [] [x] [x] [] [] [] [] []    Distance 10  feet    DME Rolling Walker    Gait Quality Shuffling     Weightbearing Status      Stairs      I=Independent, Mod I=Modified Independent, S=Supervision, SBA=Standby Assistance, CGA=Contact Guard Assistance,   Min=Minimal Assistance, Mod=Moderate Assistance, Max=Maximal Assistance, Total=Total Assistance, NT=Not Tested    PLAN:   FREQUENCY AND DURATION: 3 times/week for duration of hospital stay or until stated goals are met, whichever comes first.    TREATMENT:   TREATMENT:   Therapeutic Activity (25 Minutes): Therapeutic activity included Scooting, Lateral Scooting, Transfer Training, Ambulation on level ground, Sitting balance , and Standing balance to improve functional Activity tolerance, Balance, Coordination, Mobility, Strength, and ROM.  Therapeutic Exercise (13 Minutes): Therapeutic exercises noted below to improve functional activity tolerance, AROM, strength, and mobility.   Therapeutic activity included: sitting balance with patient reaching forward and side to side, standing with rolling walker rocking from heel to toe and weight shifting side to side with CG A/Min A   TREATMENT GRID:     Date:  11/04/24 Date:   Date:     ACTIVITY/EXERCISE AM PM AM PM AM PM   Reclined AP, QS, GS   2 x 10      
0.7 m/s    AV Mean Gradient 2 mmHg    AV VTI 15.9 cm    AV Peak Velocity 1.0 m/s    AV Peak Gradient 4 mmHg    AV Area by VTI 1.8 cm2    AV Area by Peak Velocity 1.8 cm2    Aortic Root 3.5 cm    Ascending Aorta 3.5 cm    IVC Proxmal 2.2 cm    MR VTI 85.8 cm    MV Mean Gradient 3 mmHg    MV VTI 20.2 cm    MV Mean Velocity 0.7 m/s    MR Peak Velocity 3.5 m/s    MR Peak Gradient 49 mmHg    MV Max Velocity 1.2 m/s    MV Peak Gradient 6 mmHg    MV Area by VTI 1.4 cm2    PV .0 ms    PV Max Velocity 0.6 m/s    PV Peak Gradient 1 mmHg    RVIDd 3.3 cm    RV Free Wall Peak S' 5.9 cm/s    TR Max Velocity 2.65 m/s    TR Peak Gradient 28 mmHg    Body Surface Area 2.24 m2    Fractional Shortening 2D 12 28 - 44 %    LV ESV Index A4C 59 mL/m2    LV EDV Index A4C 86 mL/m2    LV ESV Index A2C 78 mL/m2    LV EDV Index A2C 112 mL/m2    LVIDd Index 2.67 cm/m2    LVIDs Index 2.35 cm/m2    LV RWT Ratio 0.28     LV Mass 2D 189.3 88 - 224 g    LV Mass 2D Index 87.2 49 - 115 g/m2    LA Volume Index BP 29 16 - 34 ml/m2    LVOT Stroke Volume Index 13.3 mL/m2    LA Volume Index MOD A2C 28 16 - 34 ml/m2    LA Volume Index MOD A4C 28 16 - 34 ml/m2    LA Size Index 2.21 cm/m2    LA/AO Root Ratio 1.37     Ao Root Index 1.61 cm/m2    Ascending Aorta Index 1.61 cm/m2    LVOT:AV VTI Index 0.58     ANNELISE/BSA VTI 0.8 cm2/m2    ANNELISE/BSA Peak Velocity 0.8 cm2/m2    MV:LVOT VTI Index 2.20     Est. RA Pressure 15 mmHg    RVSP 43 mmHg    AV Velocity Ratio 0.60    Lactic Acid    Collection Time: 10/30/24  2:59 PM   Result Value Ref Range    Lactic Acid 2.5 (H) 0.5 - 2.0 mmol/L   Procalcitonin    Collection Time: 10/30/24  2:59 PM   Result Value Ref Range    Procalcitonin 3.24 (H) 0.00 - 0.10 ng/mL   Troponin    Collection Time: 10/30/24  3:01 PM   Result Value Ref Range    Troponin T 1538.0 (HH) 0 - 22 ng/L   Comprehensive Metabolic Panel    Collection Time: 10/30/24  4:47 PM   Result Value Ref Range    Sodium 130 (L) 136 - 145 mmol/L    Potassium 4.8 3.5

## 2024-11-08 ENCOUNTER — TELEPHONE (OUTPATIENT)
Age: 79
End: 2024-11-08

## 2024-11-08 NOTE — TELEPHONE ENCOUNTER
Reached out to pt spoke with wife ( on [facility] ) pt is in rehab and there is no ETA on his discharge from Rehab. Pt wife will call us by Monday and let us know if he is able to come 11/18 or if it needs rescheduled.

## 2024-11-23 PROBLEM — I25.5 ISCHEMIC CARDIOMYOPATHY: Status: ACTIVE | Noted: 2024-11-23

## 2024-11-23 PROBLEM — E88.09 HYPOALBUMINEMIA: Status: ACTIVE | Noted: 2024-01-01

## 2024-11-23 PROBLEM — I50.23 ACUTE ON CHRONIC SYSTOLIC (CONGESTIVE) HEART FAILURE (HCC): Status: ACTIVE | Noted: 2024-01-01

## 2024-11-23 NOTE — PLAN OF CARE
Problem: Chronic Conditions and Co-morbidities  Goal: Patient's chronic conditions and co-morbidity symptoms are monitored and maintained or improved  Outcome: Progressing  Flowsheets (Taken 11/23/2024 1538)  Care Plan - Patient's Chronic Conditions and Co-Morbidity Symptoms are Monitored and Maintained or Improved:   Monitor and assess patient's chronic conditions and comorbid symptoms for stability, deterioration, or improvement   Collaborate with multidisciplinary team to address chronic and comorbid conditions and prevent exacerbation or deterioration   Update acute care plan with appropriate goals if chronic or comorbid symptoms are exacerbated and prevent overall improvement and discharge     Problem: Discharge Planning  Goal: Discharge to home or other facility with appropriate resources  Outcome: Progressing  Flowsheets (Taken 11/23/2024 1538)  Discharge to home or other facility with appropriate resources:   Identify barriers to discharge with patient and caregiver   Arrange for needed discharge resources and transportation as appropriate   Identify discharge learning needs (meds, wound care, etc)   Arrange for interpreters to assist at discharge as needed   Refer to discharge planning if patient needs post-hospital services based on physician order or complex needs related to functional status, cognitive ability or social support system     Problem: Pain  Goal: Verbalizes/displays adequate comfort level or baseline comfort level  Outcome: Progressing  Flowsheets (Taken 11/23/2024 1538)  Verbalizes/displays adequate comfort level or baseline comfort level:   Encourage patient to monitor pain and request assistance   Assess pain using appropriate pain scale   Administer analgesics based on type and severity of pain and evaluate response   Implement non-pharmacological measures as appropriate and evaluate response   Consider cultural and social influences on pain and pain management   Notify Licensed  Independent Practitioner if interventions unsuccessful or patient reports new pain     Problem: Skin/Tissue Integrity  Goal: Absence of new skin breakdown  Description: 1.  Monitor for areas of redness and/or skin breakdown  2.  Assess vascular access sites hourly  3.  Every 4-6 hours minimum:  Change oxygen saturation probe site  4.  Every 4-6 hours:  If on nasal continuous positive airway pressure, respiratory therapy assess nares and determine need for appliance change or resting period.  Outcome: Progressing     Problem: Safety - Adult  Goal: Free from fall injury  Outcome: Progressing  Flowsheets (Taken 11/23/2024 3988)  Free From Fall Injury: Instruct family/caregiver on patient safety     Problem: ABCDS Injury Assessment  Goal: Absence of physical injury  Outcome: Progressing

## 2024-11-23 NOTE — PROGRESS NOTES
TRANSFER - IN REPORT:    Verbal report received from Meaghan BULLARD on Matias Mueller  being received from ED for routine progression of patient care      Report consisted of patient's Situation, Background, Assessment and   Recommendations(SBAR).     Information from the following report(s) Nurse Handoff Report, ED Encounter Summary, ED SBAR, Adult Overview, MAR, Recent Results, Cardiac Rhythm SR, Quality Measures, and Neuro Assessment was reviewed with the receiving nurse.    Opportunity for questions and clarification was provided.      Assessment completed upon patient's arrival to unit and care assumed.

## 2024-11-23 NOTE — H&P
mouth 2 times daily    nitroGLYCERIN (NITROSTAT) 0.4 MG SL tablet Place 1 tablet under the tongue every 5 minutes as needed for Chest pain    ranolazine (RANEXA) 500 MG extended release tablet Take 1 tablet by mouth 2 times daily    apixaban (ELIQUIS) 5 MG TABS tablet Take 1 tablet by mouth 2 times daily    Evolocumab 140 MG/ML SOAJ Inject 140 mg into the skin every 14 days    fluticasone (FLONASE) 50 MCG/ACT nasal spray 2 sprays by Nasal route daily       Review of Systems    Review of Systems   Constitutional: Negative.   HENT: Negative.     Eyes: Negative.    Cardiovascular:  Positive for dyspnea on exertion, leg swelling and orthopnea.   Respiratory:  Positive for shortness of breath.    Endocrine: Negative.    Hematologic/Lymphatic: Negative.    Skin: Negative.    Musculoskeletal: Negative.    Gastrointestinal: Negative.    Genitourinary: Negative.    Neurological: Negative.    Psychiatric/Behavioral: Negative.     Allergic/Immunologic: Negative.           Subjective:   /62   Pulse 91   Temp 98 °F (36.7 °C) (Oral)   Resp 20   Ht 1.702 m (5' 7\")   Wt 100.7 kg (222 lb)   SpO2 100%   BMI 34.77 kg/m²     Physical Exam  Constitutional:       Appearance: He is ill-appearing.      Interventions: Nasal cannula in place.   Eyes:      Pupils: Pupils are equal, round, and reactive to light.   Cardiovascular:      Rate and Rhythm: Normal rate. Rhythm irregular.   Pulmonary:      Effort: Tachypnea present.      Breath sounds: Decreased air movement present. Examination of the right-middle field reveals rales. Examination of the left-middle field reveals rales. Examination of the right-lower field reveals decreased breath sounds. Examination of the left-lower field reveals decreased breath sounds. Decreased breath sounds and rales present.   Abdominal:      General: Bowel sounds are normal.   Musculoskeletal:         General: Normal range of motion.      Right upper leg: Edema present.      Right lower leg: 3+  Pitting Edema present.      Left lower leg: 3+ Pitting Edema present.   Neurological:      Mental Status: He is alert.          Cardiographics  Telemetry: normal sinus rhythm  ECG: sinus tachycardia,frequent PVCs  Echocardiogram:   10/29/24    ECHO (TTE) COMPLETE (PRN CONTRAST/BUBBLE/STRAIN/3D) 10/30/2024  4:15 PM (Final)    Interpretation Summary    Left Ventricle: Severely reduced left ventricular systolic function with a visually estimated EF of 25 - 30%. Left ventricle is mildly dilated. Normal wall thickness. Severe global hypokinesis present. Indeterminate diastolic function due to arrhythmia.    Right Ventricle: Moderately reduced systolic function.    Mitral Valve: Mild regurgitation.    Tricuspid Valve: Mild regurgitation. Mildly elevated RVSP, consistent with mild pulmonary hypertension. The estimated RVSP is 43 mmHg.    Left Atrium: Left atrium is mildly dilated. Systolic blunting in the pulmonary veins.    IVC/SVC:  IVC is dilated.    Image quality is suboptimal. Contrast used: Definity. Procedure performed with the patient in a supine position.    Signed by: Hood Coello MD on 10/30/2024  4:15 PM     Labs:   Recent Results (from the past 24 hour(s))   Basic Metabolic Panel    Collection Time: 11/23/24  9:51 AM   Result Value Ref Range    Sodium 139 136 - 145 mmol/L    Potassium 4.5 3.5 - 5.1 mmol/L    Chloride 101 98 - 107 mmol/L    CO2 29 20 - 29 mmol/L    Anion Gap 10 7 - 16 mmol/L    Glucose 145 (H) 70 - 99 mg/dL    BUN 18 8 - 23 MG/DL    Creatinine 1.29 0.80 - 1.30 MG/DL    Est, Glom Filt Rate 56 (L) >60 ml/min/1.73m2    Calcium 9.2 8.8 - 10.2 MG/DL   CBC with Auto Differential    Collection Time: 11/23/24  9:51 AM   Result Value Ref Range    WBC 7.7 4.3 - 11.1 K/uL    RBC 3.46 (L) 4.23 - 5.6 M/uL    Hemoglobin 11.0 (L) 13.6 - 17.2 g/dL    Hematocrit 34.6 (L) 41.1 - 50.3 %    .0 82 - 102 FL    MCH 31.8 26.1 - 32.9 PG    MCHC 31.8 31.4 - 35.0 g/dL    RDW 14.2 11.9 - 14.6 %     Platelets 184 150 - 450 K/uL    MPV 8.9 (L) 9.4 - 12.3 FL    nRBC 0.02 0.0 - 0.2 K/uL    Differential Type AUTOMATED      Neutrophils % 71 43 - 78 %    Lymphocytes % 15 13 - 44 %    Monocytes % 12 4.0 - 12.0 %    Eosinophils % 1 0.5 - 7.8 %    Basophils % 0 0.0 - 2.0 %    Immature Granulocytes % 1 0.0 - 5.0 %    Neutrophils Absolute 5.6 1.7 - 8.2 K/UL    Lymphocytes Absolute 1.1 0.5 - 4.6 K/UL    Monocytes Absolute 0.9 0.1 - 1.3 K/UL    Eosinophils Absolute 0.0 0.0 - 0.8 K/UL    Basophils Absolute 0.0 0.0 - 0.2 K/UL    Immature Granulocytes Absolute 0.1 0.0 - 0.5 K/UL   Brain Natriuretic Peptide    Collection Time: 11/23/24  9:51 AM   Result Value Ref Range    NT Pro-BNP 39,096 (H) 0 - 450 PG/ML   Troponin    Collection Time: 11/23/24  9:51 AM   Result Value Ref Range    Troponin T 189.0 (HH) 0 - 22 ng/L   Troponin    Collection Time: 11/23/24 11:18 AM   Result Value Ref Range    Troponin T 152.0 (HH) 0 - 22 ng/L        Patient has been seen and examined by Dr. Green and he agrees with the following assessment and plan:     Assessment/Plan:       Principal Problem:    Acute on chronic systolic (congestive) heart failure   - admit for diuresis  - EF was 45-45% in May and is now 25-30%, prognosis is poor  - lasix 40 mg IVP BID  - will continue metoprolol tartrate for now with plan to transition to succinate  - previous not started on ACE/ARB/ARNI due to hypotension   - strict I/O and daily weights      CAD  - no c/o chest pain  - STEMI in May at [facility], [facility] to LAD patent, limited revascularization options      History of DVT (deep vein thrombosis)  - on eliquis      Chronic renal disease, stage 3, moderately decreased glomerular filtration rate between 30-59 mL/min/1.73 square meter   - monitor closely with diuresis  - daily BMP      HTN (hypertension)  - stable, monitor closely        Prostate Cancer with metastasis to bone  - stage IV      ALICIA Santoro - CNP  11/23/2024 12:54 PM

## 2024-11-23 NOTE — ED NOTES
TRANSFER - OUT REPORT:    Verbal report given to Carli BULLARD on Matias Mueller  being transferred to [facility] for routine progression of patient care       Report consisted of patient's Situation, Background, Assessment and   Recommendations(SBAR).     Information from the following report(s) ED SBAR was reviewed with the receiving nurse.    [place] Fall Assessment:    Presents to emergency department  because of falls (Syncope, seizure, or loss of consciousness): No  Age > 70: Yes  Altered Mental Status, Intoxication with alcohol or substance confusion (Disorientation, impaired judgment, poor safety awaremess, or inability to follow instructions): No  Impaired Mobility: Ambulates or transfers with assistive devices or assistance; Unable to ambulate or transer.: No  Nursing Judgement: Yes          Lines:   Peripheral IV 11/23/24 Right Antecubital (Active)   Site Assessment Clean, dry & intact 11/23/24 1001   Line Status Blood return noted 11/23/24 1001   Line Care Cap changed 11/23/24 1001   Phlebitis Assessment No symptoms 11/23/24 1001   Infiltration Assessment 0 11/23/24 1001        Opportunity for questions and clarification was provided.      Patient transported with:  Registered Nurse          Alia Kimball RN  11/23/24 7832

## 2024-11-23 NOTE — ED TRIAGE NOTES
Per EMS: patient is coming from the [facility] - SNF/ Rehab- c/o shortness of breath since yesterday ( histry of CHF, CKD anemia- o2 92@ 3l.   Patient received 40mg oral lasix- duo neb treatment- wheezing on expiratory. Edema bilateral 4+- generalized pain.     Note- patient is on continuous 2-3l of oxygen therapy.

## 2024-11-23 NOTE — FLOWSHEET NOTE
11/23/24 1538   Dual Clinician Skin Assessment   Dual Skin Assessment (4 Eyes) X   Second Clinical  (First and Last Name) Elizabeth LANE RN   Pressure Injury Documentation Pressure Injury Noted-See Wound LDA to Document   Skin Integumentary    Skin Integumentary (WDL) X   Skin Color Ecchymosis (comment);Pale;Red;Abraham;Blanchable erythema   Skin Condition/Temp Warm;Dry;Flaky;Swollen/edematous   Skin Integrity Ecchymosis;Scars (comment);Redness;Wound (see LDA)   Location blanchable redness to sacrum - allevyn placed/wound consult ordered; BLE edema; scattered bruising/scars     4 Eyes Skin Assessment     NAME:  aMtias Mueller  YOB: 1945  MEDICAL RECORD NUMBER:  [id number]    The patient is being assessed for  Admission    I agree that at least one RN has performed a thorough Head to Toe Skin Assessment on the patient. ALL assessment sites listed below have been assessed.      Areas assessed by both nurses:    Head, Face, Ears, Shoulders, Back, Chest, Arms, Elbows, Hands, Sacrum. Buttock, Coccyx, Ischium, and Legs. Feet and Heels        Does the Patient have a Wound? Yes wound(s) were present on assessment. LDA wound assessment was Initiated and completed by JUAN MIGUEL Fraire Prevention initiated by RN: Yes  Wound Care Orders initiated by RN: Yes    Pressure Injury (Stage 3,4, Unstageable, DTI, NWPT, and Complex wounds) if present, place Wound referral order by RN under : No    New Ostomies, if present place, Ostomy referral order under : No     Nurse 1 eSignature: Electronically signed by Carli Thompson RN on 11/23/24 at 6:26 PM EST    **SHARE this note so that the co-signing nurse can place an eSignature**    Nurse 2 eSignature: Electronically signed by Elizabeth Whelan RN on 11/23/24 at 6:26 PM EST

## 2024-11-24 NOTE — CARE COORDINATION
Pt chart reviewed for discharge planning.  Observation readmission, LOZA delivered.  LMSW met with pt.  Pt is known to CM staff from recent admission.  Last D/C to [facility] for rehab and pt reports that he plans to return to [facility] at D/C.  Pt normally lives with his spouse.  PCP was confirmed.  CM staff will follow pt plan of care and assist with return to SNF rehab or supportive care referrals pending pt clinical progress.  Please consult case management if additional needs arise.      11/24/24 4509   Service Assessment   Patient Orientation Alert and Oriented   Cognition Alert   History Provided By Patient;Spouse   Primary Caregiver Spouse   Support Systems Spouse/Significant Other;Family Members   Patient's Healthcare Decision Maker is: Legal Next of Kin   PCP Verified by CM Yes   Last Visit to PCP Within last 3 months   Prior Functional Level Assistance with the following:;Bathing;Dressing;Cooking;Housework;Shopping;Mobility   Current Functional Level Assistance with the following:;Bathing;Dressing;Cooking;Housework;Shopping;Mobility   Can patient return to prior living arrangement Yes  (return to SNF rehab at [facility])   Ability to make needs known: Good   Family able to assist with home care needs: Yes   Would you like for me to discuss the discharge plan with any other family members/significant others, and if so, who? Yes  (spouse)   Financial Resources Medicare;Other (Comment)  (Medicare supplement)   Community Resources Other (Comment)  (SNF rehab)   CM/SW Referral Other (see comment)  (continued SNF rehab)   Social/Functional History   Lives With Spouse   Type of Home House   Home Layout One level   Home Access Stairs to enter with rails   Entrance Stairs - Number of Steps 1   Home Equipment Rollator   Receives Help From Family   ADL Assistance Needs assistance   Homemaking Assistance Needs assistance   Ambulation Assistance Needs assistance   Transfer Assistance Independent   Active  Yes

## 2024-11-24 NOTE — ED PROVIDER NOTES
Emergency Department Provider Note       PCP: Reynold Yip MD   Age: 79 y.o.   Sex: male     DISPOSITION Decision To Admit 11/24/2024 06:42:50 AM            ICD-10-CM    1. Dyspnea, unspecified type  R06.00           Medical Decision Making     Patient here with signs and symptoms of acute on chronic congestive heart failure.  Here with conversational dyspnea, inability to lay supine and hypoxia.  Patient was diuresed in the emergency department, having some urine output but minimal improvement in his work of breathing.  BNP elevated.  Troponin elevated but not significantly deviated from baseline.  EKG shows no acute ischemic process.  At this point plan to admit to the inpatient group for continued evaluation and management.     1 or more acute illnesses that pose a threat to life or bodily function.   Shared medical decision making was utilized in creating the patients health plan today.    I independently ordered and reviewed each unique test.  I reviewed external records: ED visit note from an outside group.  I reviewed external records: provider visit note from PCP.  I reviewed external records: provider visit note from outside specialist.       My Independent EKG Interpretation: sinus rhythm, no evidence of arrhythmia      ST Segments:Normal ST segments - NO STEMI   Rate:   The patient was admitted and I have discussed patient management with the admitting provider.          History     Patient presents to the emergency department the chief complaint of shortness of breath.  He has a history of congestive heart failure.  He states his dyspnea is worse with laying supine.  He reports compliance with his medications.  He has a known history of coronary artery disease.  Denies recent travel.  Denies associated fevers or other URI-like symptoms.  No nausea vomiting.  Endorses worsening edema in his lower extremities, abdomen and arms.    The history is provided by the patient.     Physical Exam     Vitals  None    Highest education level: None   Tobacco Use    Smoking status: Former     Current packs/day: 0.00     Types: Cigarettes     Quit date: 1962     Years since quittin.9    Smokeless tobacco: Never    Tobacco comments:     Quit smoking: quit age 20   Substance and Sexual Activity    Alcohol use: No     Alcohol/week: 0.0 standard drinks of alcohol    Drug use: No   Social History Narrative    bryan in [place].  No pets, history of pet bird in the past, history of hot tub also.    Smoked briefly as a teenager, stopped when he was 20 years old.  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  , 2 children who are healthy.  Denies alcohol.  Has always li     Social Determinants of Health     Financial Resource Strain: Low Risk  (2023)    Received from [employer]    Financial Resource Strain     Difficulty Paying Living Expenses: Not hard at all     Difficulty Paying Medical Expenses: Patient declined   Food Insecurity: No Food Insecurity (2024)    Hunger Vital Sign     Worried About Running Out of Food in the Last Year: Never true     Ran Out of Food in the Last Year: Never true   Transportation Needs: No Transportation Needs (2024)    PRAPARE - Transportation     Lack of Transportation (Medical): No     Lack of Transportation (Non-Medical): No   Physical Activity: Inactive (2023)    Received from [facility]    Physical Activity     Days of Exercise per Week: 0     Minutes of Exercise per Session: 0     Total Minutes of Exercise per Week: 0   Stress: Stress Concern Present (2023)    Received from [facility]    Stress     Feeling of Stress : To some extent   Social Connections: Unknown (2024)    Received from [employer]    Social Connections     Frequency of Communication with Friends and Family: Not asked     Frequency of Social  thorax is intact.        Impression    Cardiomegaly with pulmonary vascular congestion and potential  overlying airspace disease at the lung bases. Follow-up is advised.      Electronically signed by Fransisco Townsend   Basic Metabolic Panel   Result Value Ref Range    Sodium 139 136 - 145 mmol/L    Potassium 4.5 3.5 - 5.1 mmol/L    Chloride 101 98 - 107 mmol/L    CO2 29 20 - 29 mmol/L    Anion Gap 10 7 - 16 mmol/L    Glucose 145 (H) 70 - 99 mg/dL    BUN 18 8 - 23 MG/DL    Creatinine 1.29 0.80 - 1.30 MG/DL    Est, Glom Filt Rate 56 (L) >60 ml/min/1.73m2    Calcium 9.2 8.8 - 10.2 MG/DL   CBC with Auto Differential   Result Value Ref Range    WBC 7.7 4.3 - 11.1 K/uL    RBC 3.46 (L) 4.23 - 5.6 M/uL    Hemoglobin 11.0 (L) 13.6 - 17.2 g/dL    Hematocrit 34.6 (L) 41.1 - 50.3 %    .0 82 - 102 FL    MCH 31.8 26.1 - 32.9 PG    MCHC 31.8 31.4 - 35.0 g/dL    RDW 14.2 11.9 - 14.6 %    Platelets 184 150 - 450 K/uL    MPV 8.9 (L) 9.4 - 12.3 FL    nRBC 0.02 0.0 - 0.2 K/uL    Differential Type AUTOMATED      Neutrophils % 71 43 - 78 %    Lymphocytes % 15 13 - 44 %    Monocytes % 12 4.0 - 12.0 %    Eosinophils % 1 0.5 - 7.8 %    Basophils % 0 0.0 - 2.0 %    Immature Granulocytes % 1 0.0 - 5.0 %    Neutrophils Absolute 5.6 1.7 - 8.2 K/UL    Lymphocytes Absolute 1.1 0.5 - 4.6 K/UL    Monocytes Absolute 0.9 0.1 - 1.3 K/UL    Eosinophils Absolute 0.0 0.0 - 0.8 K/UL    Basophils Absolute 0.0 0.0 - 0.2 K/UL    Immature Granulocytes Absolute 0.1 0.0 - 0.5 K/UL   Troponin   Result Value Ref Range    Troponin T 152.0 (HH) 0 - 22 ng/L   Brain Natriuretic Peptide   Result Value Ref Range    NT Pro-BNP 39,096 (H) 0 - 450 PG/ML   Troponin   Result Value Ref Range    Troponin T 189.0 (HH) 0 - 22 ng/L   Basic Metabolic Panel w/ Reflex to MG   Result Value Ref Range    Sodium 140 136 - 145 mmol/L    Potassium 4.0 3.5 - 5.1 mmol/L    Chloride 102 98 - 107 mmol/L    CO2 28 20 - 29 mmol/L    Anion Gap 10 7 - 16 mmol/L    Glucose 143 (H) 70 - 99

## 2024-11-24 NOTE — PROGRESS NOTES
[facility] CARDIOLOGY PROGRESS NOTE           11/24/2024 8:19 AM    Admit Date: 11/23/2024      Subjective:   Weight charted at 104.4 kg - 230 pounds-bed scale  Intake versus output-charted net -900 cc  Telemetry overnight shows sinus rhythm with heart rates in the early 90s range, frequent PVCs.  No chest pain and he feels that his dyspnea is a little better compared to yesterday.      ROS:  Cardiovascular:  As noted above    Objective:      Vitals:    11/24/24 0036 11/24/24 0415 11/24/24 0426 11/24/24 0802   BP:  116/64  117/77   Pulse: 82 93 89 88   Resp: 20 20  20   Temp:  97.3 °F (36.3 °C)  97.5 °F (36.4 °C)   TempSrc:  Oral  Oral   SpO2:  97%  98%   Weight:  104.4 kg (230 lb 1.6 oz)     Height:           Physical Exam:  Alert and awake and oriented in no obvious acute distress  S1 and S2 heard, regular rate and rhythm, frequent extra beats noted, no significant murmurs rubs or gallops  Lungs show bibasilar rales with diminished breath sounds in both bases, mildly increased work of breathing.  Abdomen is soft, mildly distended with shifting dullness present in the flanks.  No obvious fluid thrill..  Pitting edema noted even in the abdominal wall  Extremities show 2+ bilateral pitting ankle edema extending into both thighs and up to his abdominal wall.  Peripheral pulses are diminished in both feet and barely palpable      Data Review:     Lab Results   Component Value Date/Time     11/24/2024 03:39 AM    K 4.0 11/24/2024 03:39 AM     11/24/2024 03:39 AM    CO2 28 11/24/2024 03:39 AM    BUN 19 11/24/2024 03:39 AM    CREATININE 1.34 11/24/2024 03:39 AM    GLUCOSE 143 11/24/2024 03:39 AM    CALCIUM 9.1 11/24/2024 03:39 AM         Lab Results   Component Value Date    WBC 6.5 11/24/2024    HGB 10.9 (L) 11/24/2024    HCT 34.0 (L) 11/24/2024    .3 11/24/2024     11/24/2024        10/29/24    ECHO (TTE) COMPLETE (PRN CONTRAST/BUBBLE/STRAIN/3D) 10/30/2024  4:15 PM

## 2024-11-24 NOTE — PLAN OF CARE
Problem: Chronic Conditions and Co-morbidities  Goal: Patient's chronic conditions and co-morbidity symptoms are monitored and maintained or improved  11/24/2024 0359 by So Ribeiro RN  Outcome: Progressing  11/23/2024 1831 by Carli Thompson RN  Outcome: Progressing  Flowsheets (Taken 11/23/2024 1538)  Care Plan - Patient's Chronic Conditions and Co-Morbidity Symptoms are Monitored and Maintained or Improved:   Monitor and assess patient's chronic conditions and comorbid symptoms for stability, deterioration, or improvement   Collaborate with multidisciplinary team to address chronic and comorbid conditions and prevent exacerbation or deterioration   Update acute care plan with appropriate goals if chronic or comorbid symptoms are exacerbated and prevent overall improvement and discharge     Problem: Discharge Planning  Goal: Discharge to home or other facility with appropriate resources  11/24/2024 0359 by So Ribeiro RN  Outcome: Progressing  11/23/2024 1831 by Carli Thompson RN  Outcome: Progressing  Flowsheets (Taken 11/23/2024 1538)  Discharge to home or other facility with appropriate resources:   Identify barriers to discharge with patient and caregiver   Arrange for needed discharge resources and transportation as appropriate   Identify discharge learning needs (meds, wound care, etc)   Arrange for interpreters to assist at discharge as needed   Refer to discharge planning if patient needs post-hospital services based on physician order or complex needs related to functional status, cognitive ability or social support system     Problem: Pain  Goal: Verbalizes/displays adequate comfort level or baseline comfort level  11/24/2024 0359 by So Ribeiro RN  Outcome: Progressing  11/23/2024 1831 by Carli Thompson RN  Outcome: Progressing  Flowsheets (Taken 11/23/2024 1538)  Verbalizes/displays adequate comfort level or baseline comfort level:   Encourage patient to monitor pain and request

## 2024-11-25 NOTE — PROGRESS NOTES
[facility] CARDIOLOGY PROGRESS NOTE           11/25/2024 8:03 AM    Admit Date: 11/23/2024      Subjective:   Patient continues to feel dyspneic.  He is currently on 1 L of oxygen.  Has edema.  Notes good diuresis but only 1 L net out over last 24 hours.  Renal function stable.  No chest pain overnight    ROS:  Cardiovascular:  As noted above    Objective:      Vitals:    11/25/24 0104 11/25/24 0435 11/25/24 0512 11/25/24 0745   BP: (!) 117/54  115/70 121/68   Pulse: 75 76 81 77   Resp: 22 18 20 16   Temp: 97.3 °F (36.3 °C)  97.5 °F (36.4 °C) 97.3 °F (36.3 °C)   TempSrc: Oral  Oral Oral   SpO2: 92% 94% 96% 95%   Weight:       Height:           Physical Exam:  General-No Acute Distress  Neck- supple, no JVD  CV- regular rate and rhythm with frequent ectopy  Lung- clear bilaterally  Abd- soft, nontender, nondistended  Ext- 1+ edema bilaterally.  Skin- warm and dry      Data Review:   Recent Labs     11/25/24  0502 11/24/24  0339 11/23/24  0951   WBC 5.1 6.5 7.7   HGB 11.6* 10.9* 11.0*   HCT 36.5* 34.0* 34.6*   .4 100.3 100.0    183 184       Recent Labs     11/25/24  0502 11/24/24  0339    140   K HEMOLYZED SPECIMEN 4.0    102   CO2 27 28   BUN 21 19   CREATININE 1.38* 1.34*   GLUCOSE 119* 143*   CALCIUM 9.0 9.1   BILITOT  --  0.3   ALKPHOS  --  65   AST  --  29   ALT  --  13   LABGLOM 51* 53*   GLOB  --  3.4       No results for input(s): \"CKTOTAL\", \"CKMB\", \"CKMBINDEX\", \"DDIMER\", \"TROPONINI\" in the last 720 hours.       Echo (10/30/24):    Left Ventricle: Severely reduced left ventricular systolic function with a visually estimated EF of 25 - 30%. Left ventricle is mildly dilated. Normal wall thickness. Severe global hypokinesis present. Indeterminate diastolic function due to arrhythmia.    Right Ventricle: Moderately reduced systolic function.    Mitral Valve: Mild regurgitation.    Tricuspid Valve: Mild regurgitation. Mildly elevated RVSP, consistent with mild pulmonary  hypertension. The estimated RVSP is 43 mmHg.    Left Atrium: Left atrium is mildly dilated. Systolic blunting in the pulmonary veins.    IVC/SVC:  IVC is dilated.    Image quality is suboptimal. Contrast used: Definity. Procedure performed with the patient in a supine position.       Assessment/Plan:     Active Hospital Problems    *Acute on chronic systolic (congestive) heart failure (HCC)  Patient with severe LV dysfunction.  Admitted with decompensated heart failure.  Increase Lasix to 60 mg IV every 12 hours.  Has a complex coronary artery history.  Overall unfortunately, very poor prognosis.  Continue Jardiance.  Change Lopressor to Toprol.  Monitor renal function and may attempt ACE/ARB in the near future      Chronic renal disease, stage 3, moderately decreased glomerular filtration rate between 30-59 mL/min/1.73 square meter ([facility])  Monitor daily BMP with diuresis.      History of DVT (deep vein thrombosis)  On Eliquis.        HTN (hypertension)  Blood pressure controlled.  Monitor with adjusting CHF regimen and diuresis      Coronary artery disease of bypass graft of native heart with stable angina pectoris ([facility])  Patient basically has a patent LIMA to LAD.  Recurrent angina with any type of stressor.  Continue beta-blocker therapy and Ranexa.  Continue aspirin      Hyperlipidemia  On Repatha as an outpatient.  Unable to tolerate statin therapy                  Rigo Boyer MD

## 2024-11-25 NOTE — PLAN OF CARE
Problem: Chronic Conditions and Co-morbidities  Goal: Patient's chronic conditions and co-morbidity symptoms are monitored and maintained or improved  Outcome: Progressing  Flowsheets (Taken 11/25/2024 0839)  Care Plan - Patient's Chronic Conditions and Co-Morbidity Symptoms are Monitored and Maintained or Improved:   Collaborate with multidisciplinary team to address chronic and comorbid conditions and prevent exacerbation or deterioration   Monitor and assess patient's chronic conditions and comorbid symptoms for stability, deterioration, or improvement   Update acute care plan with appropriate goals if chronic or comorbid symptoms are exacerbated and prevent overall improvement and discharge     Problem: Discharge Planning  Goal: Discharge to home or other facility with appropriate resources  Outcome: Progressing  Flowsheets (Taken 11/25/2024 0839)  Discharge to home or other facility with appropriate resources:   Identify barriers to discharge with patient and caregiver   Arrange for needed discharge resources and transportation as appropriate   Identify discharge learning needs (meds, wound care, etc)   Arrange for interpreters to assist at discharge as needed   Refer to discharge planning if patient needs post-hospital services based on physician order or complex needs related to functional status, cognitive ability or social support system     Problem: Pain  Goal: Verbalizes/displays adequate comfort level or baseline comfort level  Outcome: Progressing  Flowsheets (Taken 11/25/2024 0839)  Verbalizes/displays adequate comfort level or baseline comfort level:   Encourage patient to monitor pain and request assistance   Assess pain using appropriate pain scale   Administer analgesics based on type and severity of pain and evaluate response   Implement non-pharmacological measures as appropriate and evaluate response   Consider cultural and social influences on pain and pain management   Notify Licensed

## 2024-11-25 NOTE — CARE COORDINATION
CM contacted by both patient's son and spouse with concerns to get back into rehab at [facility]. Son reports the facility asked for a copay to hold the bed until the patient is ready to discharge. Spouse reports she \"let the bed go\" this AM.   CM informed that referral to return was placed on 11/24. CM will update Amina at [facility] on patient's progress.    1400 CM informed patient upgraded to IP status.

## 2024-11-25 NOTE — PROGRESS NOTES
Patient Son Johnny requesting for case management to give him a call @ [phone].   Also requesting MD to follow up for updates.

## 2024-11-25 NOTE — WOUND CARE
Scars and blanchable erythema defined areas each buttock and coccyx. Right is 3x3 and left is 4x5cm.   Recommend pink silicone foam, frequent turning and foam cushion in chair if up to chair. Skin is intact currently. Patient does not like how it feels to have the wedge or pillow behind him, states it makes it more difficult to breath. Encouraged patient to stay on his side and relieve some pressure for at least an hour every couple of hours, he agreed for now.

## 2024-11-26 PROBLEM — R06.00 DYSPNEA: Status: ACTIVE | Noted: 2024-11-26

## 2024-11-26 PROBLEM — R53.81 DEBILITY: Status: ACTIVE | Noted: 2024-01-01

## 2024-11-26 PROBLEM — Z51.5 ENCOUNTER FOR PALLIATIVE CARE: Status: ACTIVE | Noted: 2024-01-01

## 2024-11-26 PROBLEM — R60.9 EDEMA: Status: ACTIVE | Noted: 2024-01-01

## 2024-11-26 PROBLEM — R53.83 FATIGUE: Status: ACTIVE | Noted: 2024-01-01

## 2024-11-26 NOTE — PROGRESS NOTES
ACUTE OCCUPATIONAL THERAPY GOALS:   (Developed with and agreed upon by patient and/or caregiver.)  1. Pt will toilet with CGA   2. Pt will complete functional mobility for ADLs with CGA using AD as needed  3. Pt will complete lower body dressing with CGA using AE as needed  4. Pt will complete grooming and hygiene at sink with CGA  5. Pt will tolerate 23 minutes functional activity with min or fewer rest breaks to promote increased endurance for ADLs      Timeframe: 7 visits      OCCUPATIONAL THERAPY Initial Assessment and Daily Note       OT Visit Days: 1  Acknowledge Orders  Time  OT Charge Capture  Rehab Caseload Tracker      Matias Mueller is a 79 y.o. male   PRIMARY DIAGNOSIS: Acute on chronic systolic (congestive) heart failure (HCC)  Acute on chronic systolic (congestive) heart failure (HCC) [I50.23]       Reason for Referral: Generalized Muscle Weakness (M62.81)  Inpatient: Payor: MEDICARE / Plan: MEDICARE PART A AND B / Product Type: *No Product type* /     ASSESSMENT:     REHAB RECOMMENDATIONS:   Recommendation to date pending progress:  Setting:  Short-term Rehab    Equipment:    To Be Determined     ASSESSMENT:  Mr. Mueller was admitted with BRYSON, end stage heart failure. Pt presented with generalized weakness, > O2 needs, and deficits in activity tolerance, mobility, and balance impacting ADLs. Pt required mod A for bed mobility, min A to stand and for transfer to chair. Pt fatigued quickly with minimal exertion and required significant extra time for all tasks d/t need for rest breaks. Pt presented below his functional baseline and would benefit from skilled OT services to address deficits. Rec return to rehab.      [facility] AM-PAC™ “6 Clicks” Daily Activity Inpatient Short Form:    AM-PAC Daily Activity - Inpatient   How much help is needed for putting on and taking off regular lower body clothing?: A Lot  How much help is needed for bathing (which includes washing, rinsing, drying)?:  A Lot  How much help is needed for toileting (which includes using toilet, bedpan, or urinal)?: A Little  How much help is needed for putting on and taking off regular upper body clothing?: A Little  How much help is needed for taking care of personal grooming?: None  How much help for eating meals?: None  AM-[facility] Inpatient Daily Activity Raw Score: 18  AM-PAC Inpatient ADL T-Scale Score : 38.66  ADL Inpatient CMS 0-100% Score: 46.65  ADL Inpatient CMS G-Code Modifier : CK           SUBJECTIVE:     Mr. Mueller states, \"I was doing well at rehab.\"     Social/Functional Lives With: Spouse  Type of Home: House  Home Layout: One level  Home Access: Stairs to enter with rails  Entrance Stairs - Number of Steps: 1  Home Equipment: Rollator  Receives Help From: Family  ADL Assistance: Needs assistance  Homemaking Assistance: Needs assistance  Ambulation Assistance: Needs assistance  Transfer Assistance: Independent  Active : Yes  Mode of Transportation: Car  Occupation: Retired    OBJECTIVE:     LINES / DRAINS / AIRWAY: External Catheter and Telemetry     RESTRICTIONS/PRECAUTIONS:       PAIN: VITALS / O2:   Pre Treatment:    0      Post Treatment: 0       Vitals          Oxygen   3L nasal cannula  98% at rest, desat 82% w/ activity, 99% at end of session         GROSS EVALUATION: INTACT IMPAIRED   (See Comments)   UE AROM [] [x]< B shoulders ~90* d/dt weakness and underlying rotator cuff problems   UE PROM [] []   Strength []  3 to 3+/5 within range     Posture / Balance []  CGA-min A w/ RW   Sensation []     Coordination []       Tone []       Edema []    Activity Tolerance []  Poor      Hand Dominance R [] L []      COGNITION/  PERCEPTION: INTACT IMPAIRED   (See Comments)   Orientation [x]     Vision []     Hearing []     Cognition  [x]     Perception []       MOBILITY: I Mod I S SBA CGA Min Mod Max Total  NT x2 Comments:   Bed Mobility    Rolling [] [] [] [] [] [] [] [] [] [] []    Supine to Sit [] [] [] [] [] []

## 2024-11-26 NOTE — PLAN OF CARE
Problem: Chronic Conditions and Co-morbidities  Goal: Patient's chronic conditions and co-morbidity symptoms are monitored and maintained or improved  11/26/2024 0232 by Lorene Givens RN  Outcome: Progressing  11/25/2024 1518 by Carli Thompson RN  Outcome: Progressing  Flowsheets (Taken 11/25/2024 0839)  Care Plan - Patient's Chronic Conditions and Co-Morbidity Symptoms are Monitored and Maintained or Improved:   Collaborate with multidisciplinary team to address chronic and comorbid conditions and prevent exacerbation or deterioration   Monitor and assess patient's chronic conditions and comorbid symptoms for stability, deterioration, or improvement   Update acute care plan with appropriate goals if chronic or comorbid symptoms are exacerbated and prevent overall improvement and discharge     Problem: Discharge Planning  Goal: Discharge to home or other facility with appropriate resources  11/26/2024 0232 by Lorene Givens RN  Outcome: Progressing  11/25/2024 1518 by Carli Thompson RN  Outcome: Progressing  Flowsheets (Taken 11/25/2024 0839)  Discharge to home or other facility with appropriate resources:   Identify barriers to discharge with patient and caregiver   Arrange for needed discharge resources and transportation as appropriate   Identify discharge learning needs (meds, wound care, etc)   Arrange for interpreters to assist at discharge as needed   Refer to discharge planning if patient needs post-hospital services based on physician order or complex needs related to functional status, cognitive ability or social support system     Problem: Pain  Goal: Verbalizes/displays adequate comfort level or baseline comfort level  11/26/2024 0232 by Lorene Givens, RN  Outcome: Progressing  11/25/2024 1518 by Carli Thompson RN  Outcome: Progressing  Flowsheets (Taken 11/25/2024 0839)  Verbalizes/displays adequate comfort level or baseline comfort level:   Encourage patient to monitor pain and  request assistance   Assess pain using appropriate pain scale   Administer analgesics based on type and severity of pain and evaluate response   Implement non-pharmacological measures as appropriate and evaluate response   Consider cultural and social influences on pain and pain management   Notify Licensed Independent Practitioner if interventions unsuccessful or patient reports new pain     Problem: Skin/Tissue Integrity  Goal: Absence of new skin breakdown  Description: 1.  Monitor for areas of redness and/or skin breakdown  2.  Assess vascular access sites hourly  3.  Every 4-6 hours minimum:  Change oxygen saturation probe site  4.  Every 4-6 hours:  If on nasal continuous positive airway pressure, respiratory therapy assess nares and determine need for appliance change or resting period.  11/26/2024 0232 by Lorene Givens, RN  Outcome: Progressing  11/25/2024 1518 by Carli Thompson RN  Outcome: Progressing     Problem: Safety - Adult  Goal: Free from fall injury  11/26/2024 0232 by Lorene Givens, RN  Outcome: Progressing  11/25/2024 1518 by Carli Thompson RN  Outcome: Progressing     Problem: ABCDS Injury Assessment  Goal: Absence of physical injury  11/26/2024 0232 by Lorene Givens RN  Outcome: Progressing  11/25/2024 1518 by Carli Thompson RN  Outcome: Progressing

## 2024-11-26 NOTE — PROGRESS NOTES
Nutrition Assessment  Assessment Type: Initial, Wound  Reason for visit:  Best Practice Alert for Pressure Injury Stage 2 or greater  Malnutrition Screening Tool Score: 1    Nutrition Intervention:   Food and/or Nutrient Delivery:   Meals and Snacks:  Diet: Continue current order  Medical Food Supplements:   Medical food supplement therapy:  None Pt declined offers      Malnutrition Assessment:  Malnutrition Status: At risk for malnutrition (decreased appetite/intakes, wounds)  no charlotte wasting  Nutrition Focused Physical Exam: Unremarkable     Nutrition Assessment:  Food/Nutrition Related History:   Patient reports that his appetite has decreased over the last year, and endorses eating 50% less than usual as a result. Blames his chronic illnesses. States that with the fluid retention, he feels full after minimal intakes. Doesn't follow a specific diet at baseline - loves seafood & chinese food. He eats 2x/day on average (late breakfast & dinner). Reports having to sit up really well so he doesn't \"get strangled\" on his foods due to \"respiratory issues\".      Do You Have Any Cultural, Spiritism, or Ethnic Food Preferences?: No     Weight History:   EMR weight history review: 238# 11/1/23 (Oncology), 240# 1/2/24 (FOV), 240# 3/12/24 (Vascular Surgery), 236# 4/11/24 (Vascular Surgery), 229# 5/15/24 (FOV), 223# 6/5/24 (FOV), 224# 7/10/24 (FOV), 225# 8/1/24 (Oncology), 227# 10/17/24 (Vascular Surgery).   States his weight has been trending down. Bedscale weight obtained today by RN is 230# which is consistent with previous weights.     Nutrition Background:   Wound Type: Stage II (to BL buttocks)   PMH significant for CHF, CAD, ischemic cardiomyopathy, CABG, NSTEMI, CKD 3, prostate cancer w/ mets to the bones, arthritis, GERD, BLAIR, HTN, HLD.   Patient presents with worsening SOB. Is admitted with acute on chronic heart failure.   11/25: wound care following for stage II pressure ulcer to BL buttocks     Nutrition

## 2024-11-26 NOTE — PROGRESS NOTES
Spiritual Health History and Assessment/Progress Note  [facility]    (P) Initial Encounter,  ,  ,      Name: Matias Mueller MRN: [id number]    Age: 79 y.o.     Sex: male   Language: English   Restorationist: Episcopalian   Acute on chronic systolic (congestive) heart failure (HCC)     Date: 11/26/2024            Total Time Calculated: (P) 35 min              Spiritual Assessment began in [facility] 4 TELEMETRY        Referral/Consult From: (P) Rounding   Encounter Overview/Reason: (P) Initial Encounter  Service Provided For: (P) Patient    Shayla, Belief, Meaning:   Patient identifies as spiritual, is connected with a shayla tradition or spiritual practice, and has beliefs or practices that help with coping during difficult times  Family/Friends No family/friends present      Importance and Influence:  Patient has no beliefs influential to healthcare decision-making identified during this visit  Family/Friends No family/friends present    Community:  Patient is connected with a spiritual community  Family/Friends No family/friends present    Assessment and Plan of Care:     Patient Interventions include: Facilitated expression of thoughts and feelings, Explored spiritual coping/struggle/distress, Engaged in theological reflection, Affirmed coping skills/support systems, and Provided sacramental/Mormon ritual  Family/Friends Interventions include: No family/friends present    Patient Plan of Care: Spiritual Care available upon further referral  Family/Friends Plan of Care: No family/friends present    Electronically signed by OUSMANE GUTIERREZ on 11/26/2024 at 11:42 AM

## 2024-11-26 NOTE — PROGRESS NOTES
Pt up to chair with OT. P called nurses station with c/o severe pain/difficulty breathing. Pt states that he cannot \"take 1 more second of sitting in the chair\". Pt wishes to get back to bed. Pt educated on importance of frequent repositioning for pressure injury prevention. Pt verbalized understanding, but wishes to get back in bed. Pt agreeable to stay on his side for at least an hour every couple of hours. Pt turned to L side and is comfortable. Sacrum remains intact with blanchable redness. Allevyn in place.

## 2024-11-26 NOTE — PROGRESS NOTES
[facility] CARDIOLOGY PROGRESS NOTE           11/26/2024 7:51 AM    Admit Date: 11/23/2024      Subjective:   Patient continues to feel dyspneic.  He is on 3 L of oxygen.  Fortunately renal function appears to be stable.  1.1 L diuresis over the last 24 hours    ROS:  Cardiovascular:  As noted above    Objective:      Vitals:    11/25/24 2041 11/25/24 2352 11/26/24 0424 11/26/24 0705   BP: 116/60 (!) 105/54 (!) 115/52 115/69   Pulse: 61 65 100 75   Resp: 19 18 18 16   Temp: 97.6 °F (36.4 °C) 97.8 °F (36.6 °C) 98.1 °F (36.7 °C) 97.3 °F (36.3 °C)   TempSrc: Oral Oral Oral    SpO2:  97% 98% 99%   Weight:   104.3 kg (230 lb)    Height:   1.727 m (5' 8\")        Physical Exam:  General-ill-appearing male in no acute distress  Neck- supple, no JVD  CV- regular rate and rhythm with frequent ectopy  Lung- clear bilaterally  Abd- soft, nontender, nondistended  Ext- 1+ edema bilaterally.  Skin- warm and dry      Data Review:   Recent Labs     11/26/24 0435 11/25/24  0502 11/24/24  0339   WBC 6.3 5.1 6.5   HGB 10.8* 11.6* 10.9*   HCT 34.1* 36.5* 34.0*   .0 101.4 100.3    178 183       Recent Labs     11/26/24 0435 11/25/24  0502 11/24/24  0339      < > 140   K 3.9   < > 4.0      < > 102   CO2 29   < > 28   BUN 23   < > 19   CREATININE 1.51*   < > 1.34*   GLUCOSE 126*   < > 143*   CALCIUM 8.8   < > 9.1   BILITOT  --   --  0.3   ALKPHOS  --   --  65   AST  --   --  29   ALT  --   --  13   LABGLOM 46*   < > 53*   GLOB  --   --  3.4    < > = values in this interval not displayed.       No results for input(s): \"CKTOTAL\", \"CKMB\", \"CKMBINDEX\", \"DDIMER\", \"TROPONINI\" in the last 720 hours.       Echo (10/30/24):    Left Ventricle: Severely reduced left ventricular systolic function with a visually estimated EF of 25 - 30%. Left ventricle is mildly dilated. Normal wall thickness. Severe global hypokinesis present. Indeterminate diastolic function due to arrhythmia.    Right Ventricle: Moderately

## 2024-11-26 NOTE — CARE COORDINATION
CM reviewed clinical notes today. Dr Boyer ordered Palliative Care consult and plan for hospice transition if patient's condition doesn't improve.Patient has increased O2 need from 1-3 lpm. Hospice consult placed by Palliative Care.   Wound care consulted for buttocks/coccyx wound.  Patient spouse contacted this CM to verify that the patient heard the plan correctly from Dr Boyer. CM confirmed.  Spouse reports patient had improved at [facility] and they hope to return to the facility if patient is able.  Spouse reports her son, Jg, requested [facility] hospice referral. CM sent referral to Corie at [facility] via [phone]. Spouse states she \"just wants information\".  Spouse reports she is concerned with taking care of  patient at home with hospice care. If needed, spouse prefers [facility] if in house hospice needed.     1810 CM received a call from patient's son, Jg. Jg reports spouse will not be able to care for patient in the home with hospice. Spouse has her own medical problems.  Jg states [facility] would be choice for transition of care, if patient's status does not improve. Jg reports he knows the personnel at the facility.

## 2024-11-26 NOTE — CONSULTS
Palliative Care    Patient: Matias Mueller MRN: [id number]  SSN: [id number]    YOB: 1945  Age: 79 y.o.  Sex: male       Date of Request: 11/26/2024  Date of Consult:  11/26/2024  Reason for Consult:  goals of care  Requesting Physician: Dr Boyer      Assessment/Plan:     Principal Diagnosis:    Dyspnea  R06.00    Additional Diagnoses:   Debility, Unspecified  R53.81  Edema  R60.9  Fatigue, Lethargy  R53.83  Counseling, Encounter for Medical Advice  Z71.9  Encounter for Palliative Care  Z51.5    Palliative Performance Scale (PPS):       Medical Decision Making:   Reviewed and summarized notes from admission to present   Discussed case with appropriate providers- CUBA Rosales  Reviewed laboratory and x-ray data from admission to present     Pt sitting in recliner, no distress noted. No family at bedside.  Introduced role of PC and reviewed events.  Pt has good understanding of his condition and poor prognosis.  He states he has lived a good life, and is ready to go whenever the Lord is ready for him.  Pt voiced sadness over his physical decline over the last year.  He used to be very active, but has not been able to participate in activities he enjoys.  Affirmed his feelings and provided support.  We discussed options moving forward.  Pt wants to go back to STR, and eventually home.  He voiced understanding that he may not tolerate STR due to the end stage nature of his heart failure.  We discussed hospice support at home, as well as code status.  He has been a DNR in the past, but is a partial code at present.  Through discussion, it seems he is afraid of leaving his wife. Counseled that resuscitative efforts in the setting of his end stage heart failure would not likely be successful, and would cause significant harm.  He would like to discuss with his wife further.    I spoke with pt's wife via phone.  She has several appts today and will not be coming to the hospital.  Reviewed events and

## 2024-11-26 NOTE — PROGRESS NOTES
ACUTE PHYSICAL THERAPY GOALS:   (Developed with and agreed upon by patient and/or caregiver.)    (1.) Matias Mueller  will move from supine to sit and sit to supine , scoot up and down, and roll side to side with CONTACT GUARD ASSIST within 7 treatment day(s).    (2.) Matias Mueller will transfer from bed to chair and chair to bed with SUPERVISION using the least restrictive device within 7 treatment day(s).    (3.) Matias Mueller will ambulate with SUPERVISION for 50 feet with the least restrictive device within 7 treatment day(s).   (4.) Matias Mueller will perform standing static and dynamic balance activities x 5 minutes with SUPERVISION to improve safety within 7 treatment day(s).  (5.) Matias Mueller will propel wheelchair > 100 feet to assist with mobility/increase endurance on level surfaces within 7 treatment day(s).  (6.) Matias Mueller will perform therapeutic exercises x 15 min for HEP with SUPERVISION to improve strength, endurance, and functional mobility within 7 treatment day(s).      PHYSICAL THERAPY Initial Assessment and AM  (Link to Caseload Tracking: PT Visit Days : 1  Acknowledge Orders  Time In/Out  PT Charge Capture  Rehab Caseload Tracker    Matias Mueller is a 79 y.o. male   PRIMARY DIAGNOSIS: Acute on chronic systolic (congestive) heart failure (HCC)  Acute on chronic systolic (congestive) heart failure (HCC) [I50.23]       Reason for Referral: Generalized Muscle Weakness (M62.81)  Inpatient: Payor: MEDICARE / Plan: MEDICARE PART A AND B / Product Type: *No Product type* /     ASSESSMENT:     REHAB RECOMMENDATIONS:   Recommendation to date pending progress:  Setting:  Short-term Rehab    Equipment:    To Be Determined     ASSESSMENT:  Mr. Mueller is a 79 years old male admitted from [facility] on 11/23/24 with clos increasing dyspnea and diagnosed with acute on chronic systolic hear failure, end stage heart failure. Pt has been at [facility] since he was hospitalized in  with rails  Entrance Stairs - Number of Steps: 1  Home Equipment: Rollator  Receives Help From: Family  ADL Assistance: Needs assistance  Homemaking Assistance: Needs assistance  Ambulation Assistance: Needs assistance  Transfer Assistance: Independent  Active : Yes  Mode of Transportation: Car  Occupation: Retired    OBJECTIVE:     PAIN: VITALS / O2: PRECAUTION / LINES / DRAINS:   Pre Treatment:    4/10 left hip      Post Treatment: 4 / 10 left hip Vitals        Oxygen   2 liters with sp02 98   External Catheter, telemetry    RESTRICTIONS/PRECAUTIONS:   Falls  cardiac                 GROSS EVALUATION:  Intact Impaired (Comments):   AROM [x]     PROM []    Strength []  3+/5 LE's   Balance []  Standing with rolling walker with CGA   Posture [] N/A  Forward Head   Sensation []  Decreased for light sensation LE's   Coordination []      Tone [x]     Edema [] Positive swelling LE   Activity Tolerance []  Poor, fatigues easily with therapeutic exercises    []      COGNITION/  PERCEPTION: Intact Impaired (Comments):   Orientation [x]     Vision [x]     Hearing [x]     Cognition  [x]       MOBILITY: I Mod I S SBA CGA Min Mod Max Total  NT x2 Comments:   Bed Mobility    Rolling [] [] [] [] [] [] [] [] [] [x] []    Supine to Sit [] [] [] [] [] [] [] [] [] [x] []    Scooting [] [] [] [] [] [] [] [] [] [x] []    Sit to Supine [] [] [] [] [] [] [] [] [] [x] []    Transfers    Sit to Stand [] [] [] [] [] [x] [] [] [] [] [] RW   Bed to Chair [] [] [] [] [x] [x] [] [] [] [] [] RW   Stand to Sit [] [] [] [] [x] [x] [] [] [] [] [] RW    [] [] [] [] [] [] [] [] [] [] []    I=Independent, Mod I=Modified Independent, S=Supervision, SBA=Standby Assistance, CGA=Contact Guard Assistance,   Min=Minimal Assistance, Mod=Moderate Assistance, Max=Maximal Assistance, Total=Total Assistance, NT=Not Tested    GAIT: I Mod I S SBA CGA Min Mod Max Total  NT x2 Comments:   Level of Assistance [] [] [] [] [] [] [] [] [] [] []    Distance  4  feet  active; P = passive    AFTER TREATMENT PRECAUTIONS: Call light within reach, Chair, Heels floated, Needs within reach, and RN notified    INTERDISCIPLINARY COLLABORATION:  RN/ PCT    EDUCATION: Education Given To: Patient  Education Provided: Role of Therapy;Plan of Care;Home Exercise Program;Precautions;Transfer Training;Energy Conservation  Education Method: Demonstration;Verbal  Barriers to Learning: None  Education Outcome: Verbalized understanding;Continued education needed    TIME IN/OUT:  Time In: 1010  Time Out: 1044  Minutes: 34    Diandra Huitron, PT

## 2024-11-26 NOTE — PLAN OF CARE
Problem: Chronic Conditions and Co-morbidities  Goal: Patient's chronic conditions and co-morbidity symptoms are monitored and maintained or improved  11/26/2024 1454 by Carli Thompson RN  Outcome: Progressing  Flowsheets (Taken 11/26/2024 0830)  Care Plan - Patient's Chronic Conditions and Co-Morbidity Symptoms are Monitored and Maintained or Improved:   Monitor and assess patient's chronic conditions and comorbid symptoms for stability, deterioration, or improvement   Collaborate with multidisciplinary team to address chronic and comorbid conditions and prevent exacerbation or deterioration   Update acute care plan with appropriate goals if chronic or comorbid symptoms are exacerbated and prevent overall improvement and discharge  11/26/2024 0232 by Lorene Givens, RN  Outcome: Progressing     Problem: Discharge Planning  Goal: Discharge to home or other facility with appropriate resources  11/26/2024 1454 by Carli Thompson, RN  Outcome: Progressing  Flowsheets (Taken 11/26/2024 0830)  Discharge to home or other facility with appropriate resources:   Identify barriers to discharge with patient and caregiver   Arrange for needed discharge resources and transportation as appropriate   Identify discharge learning needs (meds, wound care, etc)   Arrange for interpreters to assist at discharge as needed   Refer to discharge planning if patient needs post-hospital services based on physician order or complex needs related to functional status, cognitive ability or social support system  11/26/2024 0232 by Lorene Givens, RN  Outcome: Progressing     Problem: Pain  Goal: Verbalizes/displays adequate comfort level or baseline comfort level  11/26/2024 1454 by Carli Thompson RN  Outcome: Progressing  Flowsheets (Taken 11/26/2024 0830)  Verbalizes/displays adequate comfort level or baseline comfort level:   Encourage patient to monitor pain and request assistance   Assess pain using appropriate pain scale    Administer analgesics based on type and severity of pain and evaluate response   Implement non-pharmacological measures as appropriate and evaluate response   Consider cultural and social influences on pain and pain management   Notify Licensed Independent Practitioner if interventions unsuccessful or patient reports new pain  11/26/2024 0232 by Lorene Givens, RN  Outcome: Progressing     Problem: Skin/Tissue Integrity  Goal: Absence of new skin breakdown  Description: 1.  Monitor for areas of redness and/or skin breakdown  2.  Assess vascular access sites hourly  3.  Every 4-6 hours minimum:  Change oxygen saturation probe site  4.  Every 4-6 hours:  If on nasal continuous positive airway pressure, respiratory therapy assess nares and determine need for appliance change or resting period.  11/26/2024 1454 by Carli Thompson RN  Outcome: Progressing  11/26/2024 0232 by Lorene Givens RN  Outcome: Progressing     Problem: Safety - Adult  Goal: Free from fall injury  11/26/2024 1454 by Carli Thompson RN  Outcome: Progressing  Flowsheets (Taken 11/26/2024 0830)  Free From Fall Injury:   Instruct family/caregiver on patient safety   Based on caregiver fall risk screen, instruct family/caregiver to ask for assistance with transferring infant if caregiver noted to have fall risk factors  11/26/2024 0232 by Lorene Givens, RN  Outcome: Progressing     Problem: ABCDS Injury Assessment  Goal: Absence of physical injury  11/26/2024 1454 by Carli Thompson RN  Outcome: Progressing  11/26/2024 0232 by Lorene Givens, RN  Outcome: Progressing

## 2024-11-27 NOTE — PLAN OF CARE
Problem: Chronic Conditions and Co-morbidities  Goal: Patient's chronic conditions and co-morbidity symptoms are monitored and maintained or improved  11/27/2024 0411 by Peyton Mcdaniel RN  Outcome: Progressing  11/26/2024 1454 by Carli Thompson RN  Outcome: Progressing  Flowsheets (Taken 11/26/2024 0830)  Care Plan - Patient's Chronic Conditions and Co-Morbidity Symptoms are Monitored and Maintained or Improved:   Monitor and assess patient's chronic conditions and comorbid symptoms for stability, deterioration, or improvement   Collaborate with multidisciplinary team to address chronic and comorbid conditions and prevent exacerbation or deterioration   Update acute care plan with appropriate goals if chronic or comorbid symptoms are exacerbated and prevent overall improvement and discharge     Problem: Discharge Planning  Goal: Discharge to home or other facility with appropriate resources  11/27/2024 0411 by Peyton Mcdaniel RN  Outcome: Progressing  11/26/2024 1454 by Carli Thompson RN  Outcome: Progressing  Flowsheets (Taken 11/26/2024 0830)  Discharge to home or other facility with appropriate resources:   Identify barriers to discharge with patient and caregiver   Arrange for needed discharge resources and transportation as appropriate   Identify discharge learning needs (meds, wound care, etc)   Arrange for interpreters to assist at discharge as needed   Refer to discharge planning if patient needs post-hospital services based on physician order or complex needs related to functional status, cognitive ability or social support system     Problem: Pain  Goal: Verbalizes/displays adequate comfort level or baseline comfort level  11/27/2024 0411 by Peyton Mcdaniel RN  Outcome: Progressing  11/26/2024 1454 by Carli Thompson RN  Outcome: Progressing  Flowsheets (Taken 11/26/2024 0830)  Verbalizes/displays adequate comfort level or baseline comfort level:   Encourage patient to monitor pain and

## 2024-11-27 NOTE — PLAN OF CARE
Problem: Chronic Conditions and Co-morbidities  Goal: Patient's chronic conditions and co-morbidity symptoms are monitored and maintained or improved  11/27/2024 1108 by Carli Thompson RN  Outcome: Progressing  Flowsheets (Taken 11/27/2024 0826)  Care Plan - Patient's Chronic Conditions and Co-Morbidity Symptoms are Monitored and Maintained or Improved:   Monitor and assess patient's chronic conditions and comorbid symptoms for stability, deterioration, or improvement   Collaborate with multidisciplinary team to address chronic and comorbid conditions and prevent exacerbation or deterioration   Update acute care plan with appropriate goals if chronic or comorbid symptoms are exacerbated and prevent overall improvement and discharge  11/27/2024 0411 by Peyton Mcdaniel RN  Outcome: Progressing     Problem: Discharge Planning  Goal: Discharge to home or other facility with appropriate resources  11/27/2024 1108 by Carli Thompson RN  Outcome: Progressing  Flowsheets (Taken 11/27/2024 0826)  Discharge to home or other facility with appropriate resources:   Identify barriers to discharge with patient and caregiver   Arrange for needed discharge resources and transportation as appropriate   Identify discharge learning needs (meds, wound care, etc)   Arrange for interpreters to assist at discharge as needed   Refer to discharge planning if patient needs post-hospital services based on physician order or complex needs related to functional status, cognitive ability or social support system  11/27/2024 0411 by Peyton Mcdaniel, RN  Outcome: Progressing     Problem: Pain  Goal: Verbalizes/displays adequate comfort level or baseline comfort level  11/27/2024 1108 by Carli Thompson RN  Outcome: Progressing  Flowsheets (Taken 11/27/2024 0826)  Verbalizes/displays adequate comfort level or baseline comfort level:   Encourage patient to monitor pain and request assistance   Assess pain using appropriate pain scale    Administer analgesics based on type and severity of pain and evaluate response   Implement non-pharmacological measures as appropriate and evaluate response   Consider cultural and social influences on pain and pain management   Notify Licensed Independent Practitioner if interventions unsuccessful or patient reports new pain  11/27/2024 0411 by Peyton Mcdaniel RN  Outcome: Progressing     Problem: Skin/Tissue Integrity  Goal: Absence of new skin breakdown  Description: 1.  Monitor for areas of redness and/or skin breakdown  2.  Assess vascular access sites hourly  3.  Every 4-6 hours minimum:  Change oxygen saturation probe site  4.  Every 4-6 hours:  If on nasal continuous positive airway pressure, respiratory therapy assess nares and determine need for appliance change or resting period.  11/27/2024 1108 by Carli Thompson RN  Outcome: Progressing  11/27/2024 0411 by Peyton Mcdaniel RN  Outcome: Progressing     Problem: Safety - Adult  Goal: Free from fall injury  11/27/2024 1108 by Carli Thompson RN  Outcome: Progressing  Flowsheets (Taken 11/27/2024 0826)  Free From Fall Injury:   Instruct family/caregiver on patient safety   Based on caregiver fall risk screen, instruct family/caregiver to ask for assistance with transferring infant if caregiver noted to have fall risk factors  11/27/2024 0411 by Peyton Mcdaniel RN  Outcome: Progressing     Problem: ABCDS Injury Assessment  Goal: Absence of physical injury  11/27/2024 1108 by Carli Thompson RN  Outcome: Progressing  11/27/2024 0411 by Peyton Mcdaniel RN  Outcome: Progressing

## 2024-11-27 NOTE — PROGRESS NOTES
[facility] CARDIOLOGY PROGRESS NOTE           11/27/2024 8:17 AM    Admit Date: 11/23/2024      Subjective:     States improved dyspnea/edema.  Still on 4 to 5 L nasal cannula  ~3.8L net neg    ROS:  Cardiovascular:  As noted above    Objective:      Vitals:    11/26/24 2123 11/26/24 2333 11/27/24 0352 11/27/24 0826   BP:  (!) 104/53 126/73 116/64   Pulse: 85 69 73 74   Resp: 17 18 20 17   Temp:  97.9 °F (36.6 °C) 98.2 °F (36.8 °C) 97.5 °F (36.4 °C)   TempSrc:  Oral Oral Oral   SpO2: 99% 99% 100% 100%   Weight:       Height:           Physical Exam:  General-No Acute Distress  Neck- supple, + JVD  CV- regular rate and rhythm no MRG  Lung-decreased at bases; expiratory wheezes  Abd- soft, nontender, nondistended  Ext- 1+ edema bilaterally.  Skin- warm and dry    Data Review:   Recent Labs     11/25/24  0502 11/26/24  0435 11/27/24  0351    142 141   K HEMOLYZED SPECIMEN 3.9 4.1   MG 2.3  --   --    BUN 21 23 23   WBC 5.1 6.3 5.7   HGB 11.6* 10.8* 11.6*   HCT 36.5* 34.1* 36.9*    189 185       Assessment/Plan:     Principal Problem:    Acute on chronic systolic (congestive) heart failure (HCC)    Ischemic cardiomyopathy  -Echo images independently reviewed suggestive of biventricular failure and concerns for end-stage cardiomyopathy  -Previously, low BPs has limited therapy for left ventricular dysfunction.  Currently on low-dose Toprol-XL/Jardiance.  Attempt low-dose MRA.  Consideration for ACE inhibitor/ARB as BPs tolerate.  -Continue current Lasix 60 mg IV twice daily.  Closely monitor renal function; some mild acute on chronic renal failure but creatinine improved overnight to 1.39.  Albumin level at 3.0; reassess transition to p.o. during hospital course.    Active Problems:    History of DVT (deep vein thrombosis)  -Maintained on Eliquis; hemoglobin relatively stable at around 11.6      Chronic renal disease, stage 3, moderately decreased glomerular filtration rate between 30-59

## 2024-11-28 NOTE — PLAN OF CARE
Problem: Chronic Conditions and Co-morbidities  Goal: Patient's chronic conditions and co-morbidity symptoms are monitored and maintained or improved  11/27/2024 2112 by Rigo Montez, RN  Outcome: Not Progressing  11/27/2024 2111 by Rigo Montez, RN  Outcome: Progressing  Flowsheets (Taken 11/27/2024 1940)  Care Plan - Patient's Chronic Conditions and Co-Morbidity Symptoms are Monitored and Maintained or Improved:   Monitor and assess patient's chronic conditions and comorbid symptoms for stability, deterioration, or improvement   Collaborate with multidisciplinary team to address chronic and comorbid conditions and prevent exacerbation or deterioration   Update acute care plan with appropriate goals if chronic or comorbid symptoms are exacerbated and prevent overall improvement and discharge  11/27/2024 1108 by Carli Thompson RN  Outcome: Progressing  Flowsheets (Taken 11/27/2024 0826)  Care Plan - Patient's Chronic Conditions and Co-Morbidity Symptoms are Monitored and Maintained or Improved:   Monitor and assess patient's chronic conditions and comorbid symptoms for stability, deterioration, or improvement   Collaborate with multidisciplinary team to address chronic and comorbid conditions and prevent exacerbation or deterioration   Update acute care plan with appropriate goals if chronic or comorbid symptoms are exacerbated and prevent overall improvement and discharge     Problem: Discharge Planning  Goal: Discharge to home or other facility with appropriate resources  11/27/2024 2112 by Rigo Montez, RN  Outcome: Not Progressing  11/27/2024 2111 by Rigo Montez, RN  Outcome: Progressing  Flowsheets (Taken 11/27/2024 1940)  Discharge to home or other facility with appropriate resources:   Identify barriers to discharge with patient and caregiver   Arrange for needed discharge resources and transportation as appropriate   Identify discharge learning needs (meds, wound care, etc)   Arrange for

## 2024-11-28 NOTE — PROGRESS NOTES
[facility] CARDIOLOGY PROGRESS NOTE           11/28/2024 9:17 AM    Admit Date: 11/23/2024      Subjective:     States improved dyspnea/edema.  Down to 3L nasal cannula; states he feels better with O2 therapy.  ~3.8L net neg    ROS:  Cardiovascular:  As noted above    Objective:      Vitals:    11/27/24 2037 11/27/24 2357 11/28/24 0426 11/28/24 0848   BP:  (!) 95/51 (!) 117/54 130/71   Pulse:  72 74 88   Resp: 18 18 18 20   Temp:  97.7 °F (36.5 °C) 97.8 °F (36.6 °C) 97.9 °F (36.6 °C)   TempSrc:  Oral Oral Oral   SpO2:  97% 97% 98%   Weight:       Height:           Physical Exam:  General-No Acute Distress  Neck- supple, + JVD  CV- regular rate and rhythm no MRG  Lung-decreased at bases; expiratory wheezes  Abd- soft, nontender, nondistended  Ext- 1+ edema bilaterally.  Skin- warm and dry    Data Review:   Recent Labs     11/27/24  0351 11/28/24  0341    139   K 4.1 4.0   BUN 23 23   WBC 5.7 7.1   HGB 11.6* 11.5*   HCT 36.9* 36.7*    199       Assessment/Plan:     Principal Problem:    Acute on chronic systolic (congestive) heart failure (HCC)    Ischemic cardiomyopathy  -Echo images independently reviewed suggestive of biventricular failure and concerns for end-stage cardiomyopathy  -Previously, low BPs has limited therapy for left ventricular dysfunction.  Currently on low-dose Toprol-XL/Jardiance.  Attempted low-dose MRA.  Consideration for ACE inhibitor/ARB as BPs tolerate.  -On Lasix 60 mg IV twice daily; plan transition to p.o. Bumex.  Closely monitor renal function; some mild acute on chronic renal failure but creatinine improved overnight to 1.29.  Albumin level at 3.0    Active Problems:    History of DVT (deep vein thrombosis)  -Maintained on Eliquis; hemoglobin relatively stable at around 11.6      Chronic renal disease, stage 3, moderately decreased glomerular filtration rate between 30-59 mL/min/1.73 square meter (HCC)  -Closely monitor; some improvement overnight.      Coronary

## 2024-11-29 NOTE — PROGRESS NOTES
ACUTE PHYSICAL THERAPY GOALS:   (Developed with and agreed upon by patient and/or caregiver.)  (1.) Matias Mueller  will move from supine to sit and sit to supine , scoot up and down, and roll side to side with CONTACT GUARD ASSIST within 7 treatment day(s).    (2.) Matias Mueller will transfer from bed to chair and chair to bed with SUPERVISION using the least restrictive device within 7 treatment day(s).    (3.) Matias Mueller will ambulate with SUPERVISION for 50 feet with the least restrictive device within 7 treatment day(s).   (4.) Matias Mueller will perform standing static and dynamic balance activities x 5 minutes with SUPERVISION to improve safety within 7 treatment day(s).  (5.) Matias Mueller will propel wheelchair > 100 feet to assist with mobility/increase endurance on level surfaces within 7 treatment day(s).  (6.) Matias Mueller will perform therapeutic exercises x 15 min for HEP with SUPERVISION to improve strength, endurance, and functional mobility within 7 treatment day(s).      PHYSICAL THERAPY: Daily Note AM   (Link to Caseload Tracking: PT Visit Days : 2  Time In/Out PT Charge Capture  Rehab Caseload Tracker  Orders    Matias Mueller is a 79 y.o. male   PRIMARY DIAGNOSIS: Acute on chronic systolic (congestive) heart failure (HCC)  Acute on chronic systolic (congestive) heart failure (HCC) [I50.23]       Inpatient: Payor: MEDICARE / Plan: MEDICARE PART A AND B / Product Type: *No Product type* /     ASSESSMENT:     REHAB RECOMMENDATIONS:   Recommendation to date pending progress:  Setting:  Short-term Rehab    Equipment:    To Be Determined     ASSESSMENT:  Mr. Mueller was supine upon contact and agreeable to PT. Patient performed supine to sit and transfer to standing with mod assist, additional time and cues for technique/hand placement. Once standing patient ambulated 20' in room with rolling walker, CGA-min assist and cues for sequencing with rolling walker.  Patient performed several bouts of prolonged static standing balance while looking outside. Patient then requested to use the BSC. Patient transferred on and off BSC with mod assist and cues for hand placement/technique demonstrating fair+ static standing balance during standing ADL activities. Patient returned to EOB and to supine with mod assist where he was repositioned for comfort. Slow, steady progress towards PT goals. Will continue PT efforts.     SUBJECTIVE:   Mr. Mueller states, \"I don't want to die at home\"     Social/Functional Lives With: Spouse  Type of Home: House  Home Layout: One level  Home Access: Stairs to enter with rails  Entrance Stairs - Number of Steps: 1  Home Equipment: Rollator  Receives Help From: Family  ADL Assistance: Needs assistance  Homemaking Assistance: Needs assistance  Ambulation Assistance: Needs assistance  Transfer Assistance: Independent  Active : Yes  Mode of Transportation: Car  Occupation: Retired  OBJECTIVE:     PAIN: VITALS / O2: PRECAUTION / LINES / DRAINS:   Pre Treatment:   Pain Assessment: None - Denies Pain      Post Treatment: 0 Vitals        Oxygen    External Catheter    RESTRICTIONS/PRECAUTIONS:        MOBILITY: I Mod I S SBA CGA Min Mod Max Total  NT x2 Comments:   Bed Mobility    Rolling [] [] [] [] [] [] [] [] [] [] []    Supine to Sit [] [] [] [] [] [] [x] [] [] [] []    Scooting [] [] [] [] [] [] [] [] [] [] []    Sit to Supine [] [] [] [] [] [] [x] [] [] [] []    Transfers    Sit to Stand [] [] [] [] [] [] [x] [] [] [] []    Bed to Chair [] [] [] [] [x] [x] [] [] [] [] []    Stand to Sit [] [] [] [] [] [] [x] [] [] [] []     [] [] [] [] [] [] [] [] [] [] []    I=Independent, Mod I=Modified Independent, S=Supervision, SBA=Standby Assistance, CGA=Contact Guard Assistance,   Min=Minimal Assistance, Mod=Moderate Assistance, Max=Maximal Assistance, Total=Total Assistance, NT=Not Tested    BALANCE: Good Fair+ Fair Fair- Poor NT Comments   Sitting Static

## 2024-11-29 NOTE — PLAN OF CARE
Problem: Chronic Conditions and Co-morbidities  Goal: Patient's chronic conditions and co-morbidity symptoms are monitored and maintained or improved  Outcome: Progressing  Flowsheets (Taken 11/28/2024 2000)  Care Plan - Patient's Chronic Conditions and Co-Morbidity Symptoms are Monitored and Maintained or Improved:   Monitor and assess patient's chronic conditions and comorbid symptoms for stability, deterioration, or improvement   Collaborate with multidisciplinary team to address chronic and comorbid conditions and prevent exacerbation or deterioration   Update acute care plan with appropriate goals if chronic or comorbid symptoms are exacerbated and prevent overall improvement and discharge     Problem: Discharge Planning  Goal: Discharge to home or other facility with appropriate resources  Outcome: Progressing  Flowsheets (Taken 11/28/2024 2000)  Discharge to home or other facility with appropriate resources:   Identify barriers to discharge with patient and caregiver   Arrange for needed discharge resources and transportation as appropriate   Identify discharge learning needs (meds, wound care, etc)     Problem: Pain  Goal: Verbalizes/displays adequate comfort level or baseline comfort level  Outcome: Progressing     Problem: Skin/Tissue Integrity  Goal: Absence of new skin breakdown  Description: 1.  Monitor for areas of redness and/or skin breakdown  2.  Assess vascular access sites hourly  3.  Every 4-6 hours minimum:  Change oxygen saturation probe site  4.  Every 4-6 hours:  If on nasal continuous positive airway pressure, respiratory therapy assess nares and determine need for appliance change or resting period.  Outcome: Progressing     Problem: Safety - Adult  Goal: Free from fall injury  Outcome: Progressing     Problem: ABCDS Injury Assessment  Goal: Absence of physical injury  Outcome: Progressing

## 2024-11-29 NOTE — PROGRESS NOTES
[facility] CARDIOLOGY PROGRESS NOTE           11/29/2024 9:57 AM    Admit Date: 11/23/2024      Subjective:     States improved dyspnea/edema compared to admission but no significant change in the last day.  Down to 3L nasal cannula; states he feels better with O2 therapy.  ~3.7L net neg    ROS:  Cardiovascular:  As noted above    Objective:      Vitals:    11/29/24 0358 11/29/24 0823 11/29/24 0824 11/29/24 1146   BP: 101/62 (!) 108/58 (!) 110/44 (!) 105/46   Pulse: 84 76  78   Resp: 16 12  16   Temp: 97.2 °F (36.2 °C) 97.9 °F (36.6 °C)  97.7 °F (36.5 °C)   TempSrc: Temporal Oral  Oral   SpO2: 99% 100%  98%   Weight: 102 kg (224 lb 12.8 oz)      Height:           Physical Exam:  General-No Acute Distress  Neck- supple, + JVD; improved  CV- regular rate and rhythm no MRG  Lung-decreased at bases; no wheezing and improved  Abd- soft, nontender, nondistended  Ext- tr to 1+ edema bilaterally.  Skin- warm and dry    Data Review:   Recent Labs     11/28/24  0341 11/29/24  0350    138   K 4.0 4.2   BUN 23 22   WBC 7.1 14.1*   HGB 11.5* 11.9*   HCT 36.7* 38.1*    207       Assessment/Plan:     Principal Problem:    Acute on chronic systolic (congestive) heart failure (HCC)    Ischemic cardiomyopathy  -Echo images independently reviewed suggestive of biventricular failure and concerns for end-stage cardiomyopathy  -Previously, low BPs has limited therapy for left ventricular dysfunction.  Currently on low-dose Toprol-XL/Jardiance.  Attempted low-dose MRA.  Consideration for ACE inhibitor/ARB as BPs tolerate but low BPs currently limited titration/add-on therapy.  -Has been transitioned to p.o. Bumex.  Closely monitor renal function; some mild acute on chronic renal failure but creatinine overall improved to around 1.3.  Albumin level at 3.0.  Continue current Bumex 2 mg twice daily.    Active Problems:    History of DVT (deep vein thrombosis)  -Maintained on Eliquis; hemoglobin relatively stable at

## 2024-11-29 NOTE — CARE COORDINATION
RN CM in room to see patient, appears to be resting comfortably with eyes closed. Called and spoke to wife Rod. She states [facility] is coming out to see patient here on Monday, also spoke to [facility] and they are going to call patient and if they need to come in house to evaluate they will do that this weekend. Family with no other CM requests at this time. RN CM to follow.

## 2024-11-30 NOTE — PROGRESS NOTES
[facility] CARDIOLOGY PROGRESS NOTE           11/30/2024 9:57 AM    Admit Date: 11/23/2024      Subjective:     States improved dyspnea/edema compared to admission but still some persistent symptoms.  Down to 3L nasal cannula; states he feels better with O2 therapy.  ~4.2L net neg    ROS:  Cardiovascular:  As noted above    Objective:      Vitals:    11/30/24 0359 11/30/24 0816 11/30/24 0919 11/30/24 1141   BP: (!) 99/49 (!) 115/46  (!) 105/59   Pulse: 80 52 62 86   Resp: 18 18     Temp: 97.5 °F (36.4 °C) 97.5 °F (36.4 °C)  98.2 °F (36.8 °C)   TempSrc: Oral Oral  Axillary   SpO2: 96% 96%  99%   Weight:       Height:           Physical Exam:  General-No Acute Distress  Neck- supple, + JVD; improved  CV- regular rate and rhythm no MRG  Lung-decreased at bases; no wheezing and improved  Abd- soft, nontender, nondistended  Ext- tr to 1+ edema bilaterally.  Skin- warm and dry    Data Review:   Recent Labs     11/29/24  0350 11/30/24  0502    137   K 4.2 4.2   BUN 22 21   WBC 14.1* 11.8*   HGB 11.9* 10.8*   HCT 38.1* 34.1*    210       Assessment/Plan:     Principal Problem:    Acute on chronic systolic (congestive) heart failure (HCC)    Ischemic cardiomyopathy  -Echo images independently reviewed suggestive of biventricular failure and concerns for end-stage cardiomyopathy  -Previously, low BPs has limited therapy for left ventricular dysfunction.  Currently on low-dose Toprol-XL/Jardiance.  Attempted low-dose MRA.  Consideration for ACE inhibitor/ARB as BPs tolerate but low BPs currently limited titration/add-on therapy.  -Has been transitioned to p.o. Bumex.  Closely monitor renal function; some mild acute on chronic renal failure but creatinine overall improved to around 1.3 and stable currently.  Albumin level at 3.0.  Continue current Bumex 2 mg twice daily.  -No further room to titrate therapy for left ventricular dysfunction.  Currently being evaluated for hospice.    Active Problems:     History of DVT (deep vein thrombosis)  -Maintained on Eliquis; hemoglobin relatively stable at around 11.6      Chronic renal disease, stage 3, moderately decreased glomerular filtration rate between 30-59 mL/min/1.73 square meter (HCC)  -Closely monitor; some improvement overnight.      Coronary artery disease of bypass graft of native heart with stable angina pectoris ([facility])  -Reviewed coronary angiogram from [facility] from 5/2024; only patent graft LIMA to distal LAD; has ostial left main which leads to a diagonal felt to be a chronic lesion.  RCA/left circumflex occluded.  -Continue plan medical therapy.  On Ranexa      Hyperlipidemia  -Prior statin intolerance; on Repatha in the outpatient setting      Encounter for palliative care  -Agree with the evaluation for palliative care      Edema  -Improved; closely monitor.    Disposition-states unable to go home as his wife cannot take care of him.  Being evaluated for hospice options.    Jayy Echols MD  11/30/2024 9:57 AM

## 2024-12-01 NOTE — MANAGEMENT PLAN
I was notified by primary RN of increased coughing, sweating, ill-appearing more than usual.  WBC improved from yesterday, however pt with increased temperature earlier today of 99.5.  No increased O2 requirements more than his baseline over the last 24 hours.  Blood cultures still pending.      Plan:   Respiratory panel  Sputum culture if possible  CXR    Of note, recently treated with abx during last admission early November per ID for recurrent lower extremity cellulitis.

## 2024-12-01 NOTE — PROGRESS NOTES
[facility] CARDIOLOGY PROGRESS NOTE           12/1/2024 9:50 AM    Admit Date: 11/23/2024      Subjective:     States feels better compared to yesterday with improved cough; some expectoration.  States improved dyspnea/edema compared to admission but still some persistent symptoms.  Down to 3L nasal cannula; states he feels better with O2 therapy.  Heart rates on telemetry reviewed in the 80-90s in sinus  ~4.2L net neg    ROS:  Cardiovascular:  As noted above    Objective:      Vitals:    12/01/24 0723 12/01/24 0824 12/01/24 1000 12/01/24 1210   BP:  (!) 111/49  107/70   Pulse: 83 (!) 33 82 64   Resp:  19  21   Temp:  97.9 °F (36.6 °C)  97.5 °F (36.4 °C)   TempSrc:       SpO2:  98%  98%   Weight:       Height:           Physical Exam:  General-No Acute Distress  Neck- supple, + JVD; improved  CV- regular rate and rhythm no MRG  Lung-decreased at bases; no wheezing and improved  Abd- soft, nontender, nondistended  Ext- tr to 1+ edema bilaterally.  Skin- warm and dry    Data Review:   Recent Labs     11/29/24  0350 11/30/24  0502 12/01/24  0507 12/01/24  0924    137 133* 135*   K 4.2 4.2 HEMOLYZED SPECIMEN 5.1   MG  --   --   --  2.4   BUN 22 21 27* 29*   WBC 14.1* 11.8*  --   --    HGB 11.9* 10.8*  --   --    HCT 38.1* 34.1*  --   --     210  --   --        Assessment/Plan:     Principal Problem:    Acute on chronic systolic (congestive) heart failure (HCC)    Ischemic cardiomyopathy  -Echo images independently reviewed suggestive of biventricular failure and concerns for end-stage cardiomyopathy  -Previously, low BPs has limited therapy for left ventricular dysfunction.  Currently on low-dose Toprol-XL/Jardiance.  Attempted low-dose MRA.  Consideration for ACE inhibitor/ARB as BPs tolerate but low BPs currently limited titration/add-on therapy.  -Has been transitioned to p.o. Bumex.  Closely monitor renal function; some mild acute on chronic renal failure but creatinine overall improved to

## 2024-12-02 NOTE — CARE COORDINATION
CM reviewed clinical notes. Patient on 3 lpm O2. PT recommending SNF on 11/29 with patient ambulating 20 ft.  CM contacted by patient's son, Jg, and patient's spouse separately. Both are reporting they would like patient to discharge to SNF ( [facility]). CM messaged Amina at [facility] for decision to accept or decline. Awaiting response.

## 2024-12-02 NOTE — PROGRESS NOTES
[facility] CARDIOLOGY PROGRESS NOTE           12/2/2024 9:50 AM    Admit Date: 11/23/2024      Subjective:     States feels better compared to yesterday with improved cough; some expectoration but improved.  Asking about short-term rehab  States improved dyspnea/edema compared to admission but still some persistent symptoms.  Down to 3L nasal cannula; states he feels better with O2 therapy.  Heart rates on telemetry reviewed in the 80-90s in sinus  ~4.6L net neg    ROS:  Cardiovascular:  As noted above    Objective:      Vitals:    12/02/24 0415 12/02/24 0511 12/02/24 0756 12/02/24 1124   BP: 99/62  (!) 121/54 (!) 113/53   Pulse: 73 82 79 82   Resp: 18  18 18   Temp: 98.4 °F (36.9 °C)  97.3 °F (36.3 °C) 97.5 °F (36.4 °C)   TempSrc: Axillary  Oral Oral   SpO2: 100% 100% 99% 98%   Weight: 100.8 kg (222 lb 3.2 oz)      Height:           Physical Exam:  General-No Acute Distress  Neck- supple, + JVD; improved  CV- regular rate and rhythm no MRG  Lung-decreased at bases; no wheezing and improved  Abd- soft, nontender, nondistended  Ext- tr to 1+ edema bilaterally.  Skin- warm and dry    Data Review:   Recent Labs     11/30/24  0502 12/01/24  0507 12/01/24  0924 12/02/24  0429      < > 135* 136   K 4.2   < > 5.1 4.3   MG  --   --  2.4  --    BUN 21   < > 29* 31*   WBC 11.8*  --   --  8.9   HGB 10.8*  --   --  10.5*   HCT 34.1*  --   --  32.8*     --   --  228    < > = values in this interval not displayed.       Assessment/Plan:     Principal Problem:    Acute on chronic systolic (congestive) heart failure (HCC)    Ischemic cardiomyopathy  -Echo images independently reviewed suggestive of biventricular failure and concerns for end-stage cardiomyopathy  -Previously, low BPs has limited therapy for left ventricular dysfunction.  Currently on low-dose Toprol-XL/Jardiance.  Attempted low-dose MRA.  Consideration for ACE inhibitor/ARB as BPs tolerate but low BPs currently limited titration/add-on  therapy.  -Has been transitioned to p.o. Bumex.  Closely monitor renal function; some mild acute on chronic renal failure but creatinine overall improved to around 1.3; some mild worsening noted and decreased Bumex dosing to 1 mg twice daily from current 2 mg twice daily.  Albumin level at 3.0.    -No further room to titrate therapy for left ventricular dysfunction.  Currently being evaluated for hospice.  Also asked about short-term rehab; discussed consideration for short-term rehab and if no improvement over time, could consider transitioning to hospice.  Previously asked about hospice consideration due to difficulty with his wife taking care of him and progressive issues with heart failure    Active Problems:    History of DVT (deep vein thrombosis)  -Maintained on Eliquis; hemoglobin relatively stable at around 11.6      Chronic renal disease, stage 3, moderately decreased glomerular filtration rate between 30-59 mL/min/1.73 square meter (HCC)  -Closely monitor; see above      Coronary artery disease of bypass graft of native heart with stable angina pectoris ([facility])  -Reviewed coronary angiogram from [facility] from 5/2024; only patent graft LIMA to distal LAD; has ostial left main which leads to a diagonal felt to be a chronic lesion.  RCA/left circumflex occluded.  -Continue plan medical therapy.  On Ranexa      Hyperlipidemia  -Prior statin intolerance; on Repatha in the outpatient setting      Encounter for palliative care  -Agree with the evaluation for palliative care      Edema  -Improved; closely monitor.    Disposition-states unable to go home as his wife cannot take care of him.  Being evaluated for hospice options.  See above with consideration for short-term rehab as well.  Defer to case management    Jayy Echols MD  12/2/2024 9:50 AM

## 2024-12-02 NOTE — PROGRESS NOTES
Nutrition Assessment  Assessment Type: Reassess  Reason for visit:  Best Practice Alert for Pressure Injury Stage 2 or greater  Malnutrition Screening Tool Score: 1    Nutrition Intervention:   Food and/or Nutrient Delivery:   Meals and Snacks:  Diet: Continue current order  Medical Food Supplements:   Medical food supplement therapy:  Initiate Glucerna Nutrition Shake (diabetic oral supplement) 220 calories, 10 grams protein per 8 ounce serving      Malnutrition Assessment:  Malnutrition Status: At risk for malnutrition (decreased appetite/intakes, wounds)  no charlotte wasting  Nutrition Focused Physical Exam: Unremarkable     Nutrition Assessment:  Food/Nutrition Related History:   Patient reports that his appetite has decreased over the last year, and endorses eating 50% less than usual as a result. Blames his chronic illnesses. States that with the fluid retention, he feels full after minimal intakes. Doesn't follow a specific diet at baseline - loves seafood & chinese food. He eats 2x/day on average (late breakfast & dinner). Reports having to sit up really well so he doesn't \"get strangled\" on his foods due to \"respiratory issues\".      Do You Have Any Cultural, Worship, or Ethnic Food Preferences?: No     Weight History:   EMR weight history review: 238# 11/1/23 (Oncology), 240# 1/2/24 (FOV), 240# 3/12/24 (Vascular Surgery), 236# 4/11/24 (Vascular Surgery), 229# 5/15/24 (FOV), 223# 6/5/24 (FOV), 224# 7/10/24 (FOV), 225# 8/1/24 (Oncology), 227# 10/17/24 (Vascular Surgery).   States his weight has been trending down. Bedscale weight obtained today by RN is 230# which is consistent with previous weights.     Nutrition Background:   Wound Type: Stage II (to BL buttocks)   PMH significant for CHF, CAD, ischemic cardiomyopathy, CABG, NSTEMI, CKD 3, prostate cancer w/ mets to the bones, arthritis, GERD, BLAIR, HTN, HLD, pre-DM.   Patient presents with worsening SOB. Is admitted with acute on chronic heart failure.

## 2024-12-02 NOTE — PROGRESS NOTES
ACUTE PHYSICAL THERAPY GOALS:   (Developed with and agreed upon by patient and/or caregiver.)  (1.) Matias Mueller  will move from supine to sit and sit to supine , scoot up and down, and roll side to side with CONTACT GUARD ASSIST within 7 treatment day(s).    (2.) Matias Mueller will transfer from bed to chair and chair to bed with SUPERVISION using the least restrictive device within 7 treatment day(s).    (3.) Matias Mueller will ambulate with SUPERVISION for 50 feet with the least restrictive device within 7 treatment day(s).   (4.) Matias Mueller will perform standing static and dynamic balance activities x 5 minutes with SUPERVISION to improve safety within 7 treatment day(s).  (5.) Matias Mueller will propel wheelchair > 100 feet to assist with mobility/increase endurance on level surfaces within 7 treatment day(s).  (6.) Matias Mueller will perform therapeutic exercises x 15 min for HEP with SUPERVISION to improve strength, endurance, and functional mobility within 7 treatment day(s).      PHYSICAL THERAPY: Daily Note PM   (Link to Caseload Tracking: PT Visit Days : 3  Time In/Out PT Charge Capture  Rehab Caseload Tracker  Orders    Matias Mueller is a 79 y.o. male   PRIMARY DIAGNOSIS: Acute on chronic systolic (congestive) heart failure (HCC)  Acute on chronic systolic (congestive) heart failure (HCC) [I50.23]       Inpatient: Payor: MEDICARE / Plan: MEDICARE PART A AND B / Product Type: *No Product type* /     ASSESSMENT:     REHAB RECOMMENDATIONS:   Recommendation to date pending progress:  Setting:  Short-term Rehab    Equipment:    To Be Determined     ASSESSMENT:  Mr. Mueller was supine upon contact and agreeable to PT. Patient performed supine to sit and transfer to standing with mod assistx2, additional time and cues for technique/hand placement. Once standing patient ambulated 25' in room with rolling walker, CGA assist and cues for sequencing with rolling walker. .

## 2024-12-02 NOTE — PROGRESS NOTES
ACUTE OCCUPATIONAL THERAPY GOALS:   (Developed with and agreed upon by patient and/or caregiver.)  1. Pt will toilet with CGA   2. Pt will complete functional mobility for ADLs with CGA using AD as needed  3. Pt will complete lower body dressing with CGA using AE as needed  4. Pt will complete grooming and hygiene at sink with CGA  5. Pt will tolerate 23 minutes functional activity with min or fewer rest breaks to promote increased endurance for ADLs        Timeframe: 7 visits    OCCUPATIONAL THERAPY: Daily Note PM   OT Visit Days: 2   Time In/Out  OT Charge Capture  Rehab Caseload Tracker  OT Orders    Matias Mueller is a 79 y.o. male   PRIMARY DIAGNOSIS: Acute on chronic systolic (congestive) heart failure (HCC)  Acute on chronic systolic (congestive) heart failure (HCC) [I50.23]       Inpatient: Payor: MEDICARE / Plan: MEDICARE PART A AND B / Product Type: *No Product type* /     ASSESSMENT:     REHAB RECOMMENDATIONS:   Recommendation to date pending progress:  Setting:  Short-term Rehab    Equipment:    To Be Determined     ASSESSMENT:  Mr. Mueller remains limited by deficits in mobility, strength, activity tolerance impacting ADLs. BLE severe edema, required total A for LB dressing and to don compression garment. Mod x2 for bed mobility and to stand, once up CGA w/ rolling walker. S/u for seated grooming. Pt continues to fatigue with activity and required rest breaks to tolerate. Pt is progressing towards goals, continue POC.        SUBJECTIVE:     Mr. Mueller states, I can't move my legs\"     Social/Functional Lives With: Spouse  Type of Home: House  Home Layout: One level  Home Access: Stairs to enter with rails  Entrance Stairs - Number of Steps: 1  Home Equipment: Rollator  Receives Help From: Family  ADL Assistance: Needs assistance  Homemaking Assistance: Needs assistance  Ambulation Assistance: Needs assistance  Transfer Assistance: Independent  Active : Yes  Mode of Transportation:

## 2024-12-03 NOTE — PROGRESS NOTES
[facility] CARDIOLOGY PROGRESS NOTE           12/3/2024 9:55 AM    Admit Date: 11/23/2024      Subjective:     States feels better compared to yesterday with improved cough; some expectoration but improved.  Being evaluated for short-term rehab  States improved dyspnea/edema compared to admission but still some persistent symptoms.  Down to 3L nasal cannula; states he feels better with O2 therapy.  In sinus rhythm on telemetry.  ~5.5L net neg    ROS:  Cardiovascular:  As noted above    Objective:      Vitals:    12/03/24 0100 12/03/24 0400 12/03/24 0618 12/03/24 0743   BP: 126/77 113/75 104/60 122/79   Pulse: 66 71  70   Resp: 18 18     Temp: 98.1 °F (36.7 °C) 97.7 °F (36.5 °C)  97.3 °F (36.3 °C)   TempSrc: Oral Oral  Oral   SpO2: 98% 99%  99%   Weight:  103.2 kg (227 lb 9.6 oz)     Height:           Physical Exam:  General-No Acute Distress  Neck- supple, + JVD; improved  CV- regular rate and rhythm no MRG  Lung-decreased at bases; no wheezing and improved  Abd- soft, nontender, nondistended  Ext- tr to 1+ edema bilaterally.  Skin- warm and dry    Data Review:   Recent Labs     12/01/24  0924 12/02/24  0429 12/03/24  0615   * 136 136   K 5.1 4.3 4.0   MG 2.4  --   --    BUN 29* 31* 29*   WBC  --  8.9 7.2   HGB  --  10.5* 11.0*   HCT  --  32.8* 34.4*   PLT  --  228 248       Assessment/Plan:     Principal Problem:    Acute on chronic systolic (congestive) heart failure (HCC)    Ischemic cardiomyopathy  -Echo images independently reviewed suggestive of biventricular failure and concerns for end-stage cardiomyopathy  -Previously, low BPs has limited therapy for left ventricular dysfunction.  Currently on low-dose Toprol-XL/Jardiance.  Attempted low-dose MRA.  Consideration for ACE inhibitor/ARB as BPs tolerate but low BPs currently limited titration/add-on therapy.  -Has been transitioned to p.o. Bumex.  Closely monitor renal function; some mild acute on chronic renal failure but creatinine overall

## 2024-12-03 NOTE — CARE COORDINATION
CM received message from Amina at [facility]. Amina has referral but wont have a bed available until 12/5.  Discussed with Dr Echols. Plan for STR in SNF to see if patient can continue to improve. If no improvement, revisit hospice transition.

## 2024-12-03 NOTE — PROGRESS NOTES
Spiritual Health History and Assessment/Progress Note  [facility]    (P) Follow-up, Volunteer Encounter,  ,  ,      Name: Matias Mueller MRN: [id number]    Age: 79 y.o.     Sex: male   Language: English   Quaker: Episcopal   Acute on chronic systolic (congestive) heart failure (HCC)     Date: 12/3/2024            Total Time Calculated: (P) 15 min              Spiritual Assessment continued in SFD 4 TELEMETRY        Referral/Consult From: (P) Volunteer, Rounding   Encounter Overview/Reason: (P) Follow-up, Volunteer Encounter  Service Provided For: (P) Patient    Shayla, Belief, Meaning:   Patient identifies as spiritual, is connected with a shayla tradition or spiritual practice, and has beliefs or practices that help with coping during difficult times  Family/Friends No family/friends present      Importance and Influence:  Patient has no beliefs influential to healthcare decision-making identified during this visit  Family/Friends No family/friends present    Community:  Patient is connected with a spiritual community  Family/Friends No family/friends present    Assessment and Plan of Care:     Patient Interventions include: Facilitated expression of thoughts and feelings and Provided sacramental/Caodaism ritual  Family/Friends Interventions include: No family/friends present    Patient Plan of Care: Spiritual Care available upon further referral  Family/Friends Plan of Care: No family/friends present    Electronically signed by OUSMANE GUTIERREZ on 12/3/2024 at 2:25 PM

## 2024-12-03 NOTE — PROGRESS NOTES
ACUTE OCCUPATIONAL THERAPY GOALS:   (Developed with and agreed upon by patient and/or caregiver.)  1. Pt will toilet with CGA   2. Pt will complete functional mobility for ADLs with CGA using AD as needed  3. Pt will complete lower body dressing with CGA using AE as needed  4. Pt will complete grooming and hygiene at sink with CGA  5. Pt will tolerate 23 minutes functional activity with min or fewer rest breaks to promote increased endurance for ADLs        Timeframe: 7 visits    OCCUPATIONAL THERAPY: Daily Note PM   OT Visit Days: 3   Time In/Out  OT Charge Capture  Rehab Caseload Tracker  OT Orders    Matias Mueller is a 79 y.o. male   PRIMARY DIAGNOSIS: Acute on chronic systolic (congestive) heart failure (HCC)  Acute on chronic systolic (congestive) heart failure (HCC) [I50.23]       Inpatient: Payor: MEDICARE / Plan: MEDICARE PART A AND B / Product Type: *No Product type* /     ASSESSMENT:     REHAB RECOMMENDATIONS:   Recommendation to date pending progress:  Setting:  Short-term Rehab    Equipment:    To Be Determined     ASSESSMENT:  Mr. Mueller remains limited by deficits in mobility, strength, activity tolerance impacting ADLs. BLE severe edema, required total A for LB dressing and to don compression garment. Mod x2 for bed mobility and min A to stand, once up CGA w/ rolling walker. S/u for seated grooming and min A for UB ADL with cues for technique and to attempt tasks. Pt continues to fatigue with activity and required rest breaks to tolerate. Pt is progressing towards goals, continue POC.        SUBJECTIVE:     Mr. Mueller states, I'm so swollen\"     Social/Functional Lives With: Spouse  Type of Home: House  Home Layout: One level  Home Access: Stairs to enter with rails  Entrance Stairs - Number of Steps: 1  Home Equipment: Rollator  Receives Help From: Family  ADL Assistance: Needs assistance  Homemaking Assistance: Needs assistance  Ambulation Assistance: Needs assistance  Transfer  Assistance, NT=Not Tested    PLAN:     FREQUENCY/DURATION   OT Plan of Care: 3 times/week for duration of hospital stay or until stated goals are met, whichever comes first.    TREATMENT:     TREATMENT:   Co-Treatment PT/OT necessary due to patient's decreased overall endurance/tolerance levels, as well as need for high level skilled assistance to complete functional transfers/mobility and functional tasks  Self Care (26 minutes): Patient participated in upper body dressing, lower body dressing, and grooming ADLs in unsupported sitting and standing with minimal verbal cueing to increase independence, decrease assistance required, and increase activity tolerance. Patient also participated in bed mobility, functional mobility, functional transfer, and compensatory technique training to increase independence, decrease assistance required, and increase activity tolerance. The patient was educated on role of occupational therapy, compensatory technique during ADL , and proper use of assistive device and patient verbalized understanding and will need reinforcement at next session.     TREATMENT GRID:  N/A    AFTER TREATMENT PRECAUTIONS: Call light within reach, Chair, Needs within reach, and remained w/ PT    INTERDISCIPLINARY COLLABORATION:  RN/ PCT and PT/ PTA    EDUCATION:       TOTAL TREATMENT DURATION AND TIME:  Time In: 1415  Time Out: 1441  Minutes: 26    Marie Gomez, OT

## 2024-12-03 NOTE — PROGRESS NOTES
ACUTE PHYSICAL THERAPY GOALS:   (Developed with and agreed upon by patient and/or caregiver.)  (1.) Matias Mueller  will move from supine to sit and sit to supine , scoot up and down, and roll side to side with CONTACT GUARD ASSIST within 7 treatment day(s).    (2.) Matias Mueller will transfer from bed to chair and chair to bed with SUPERVISION using the least restrictive device within 7 treatment day(s).    (3.) Matias Mueller will ambulate with SUPERVISION for 50 feet with the least restrictive device within 7 treatment day(s).   (4.) Matias Mueller will perform standing static and dynamic balance activities x 5 minutes with SUPERVISION to improve safety within 7 treatment day(s).  (5.) Matias Mueller will propel wheelchair > 100 feet to assist with mobility/increase endurance on level surfaces within 7 treatment day(s).  (6.) Matias Mueller will perform therapeutic exercises x 15 min for HEP with SUPERVISION to improve strength, endurance, and functional mobility within 7 treatment day(s).      PHYSICAL THERAPY: Daily Note PM   (Link to Caseload Tracking: PT Visit Days : 4  Time In/Out PT Charge Capture  Rehab Caseload Tracker  Orders    Matias Mueller is a 79 y.o. male   PRIMARY DIAGNOSIS: Acute on chronic systolic (congestive) heart failure (HCC)  Acute on chronic systolic (congestive) heart failure (HCC) [I50.23]       Inpatient: Payor: MEDICARE / Plan: MEDICARE PART A AND B / Product Type: *No Product type* /     ASSESSMENT:     REHAB RECOMMENDATIONS:   Recommendation to date pending progress:  Setting:  Short-term Rehab    Equipment:    To Be Determined     ASSESSMENT:  Mr. Mueller was supine upon contact and agreeable to PT. Patient performed supine to sit with Mod A x2, sat on EOB with SBA for approx 5min attempting to cough mucous up, pt then able to perform STS with mod assistx2 secondary to decreased strength. Once standing patient ambulated 25' in room with rolling  walker, Min assist x2 for safety and balance. Patient assisted up to chair, left with needs within reach.  Slow, steady progress towards PT goals. Will continue PT efforts.     SUBJECTIVE:   Mr. Mueller states, \"Got something caught right there\"     Social/Functional Lives With: Spouse  Type of Home: House  Home Layout: One level  Home Access: Stairs to enter with rails  Entrance Stairs - Number of Steps: 1  Home Equipment: Rollator  Receives Help From: Family  ADL Assistance: Needs assistance  Homemaking Assistance: Needs assistance  Ambulation Assistance: Needs assistance  Transfer Assistance: Independent  Active : Yes  Mode of Transportation: Car  Occupation: Retired  OBJECTIVE:     PAIN: VITALS / O2: PRECAUTION / LINES / DRAINS:   Pre Treatment:   Pain Assessment: None - Denies Pain      Post Treatment: 0 Vitals      Oxygen   On 3L O2 External Catheter    RESTRICTIONS/PRECAUTIONS:  Restrictions/Precautions  Restrictions/Precautions: Fall Risk, Bed Alarm  Restrictions/Precautions: Fall Risk, Bed Alarm     MOBILITY: I Mod I S SBA CGA Min Mod Max Total  NT x2 Comments:   Bed Mobility    Rolling [] [] [] [] [] [] [] [] [] [] []    Supine to Sit [] [] [] [] [] [] [x] [] [] [] [x]    Scooting [] [] [] [] [] [] [x] [] [] [] [x]    Sit to Supine [] [] [] [] [] [] [] [] [] [x] []    Transfers    Sit to Stand [] [] [] [] [] [] [x] [] [] [] [x]    Bed to Chair [] [] [] [] [] [x] [] [] [] [] [x]    Stand to Sit [] [] [] [] [] [x] [x] [] [] [] [x]     [] [] [] [] [] [] [] [] [] [] []    I=Independent, Mod I=Modified Independent, S=Supervision, SBA=Standby Assistance, CGA=Contact Guard Assistance,   Min=Minimal Assistance, Mod=Moderate Assistance, Max=Maximal Assistance, Total=Total Assistance, NT=Not Tested    BALANCE: Good Fair+ Fair Fair- Poor NT Comments   Sitting Static [x] [] [] [] [] []    Sitting Dynamic [] [x] [] [] [] []              Standing Static [] [x] [x] [] [] []    Standing Dynamic [] [] [x] [] [] []       GAIT: I Mod I S SBA CGA Min Mod Max Total  NT x2 Comments:   Level of Assistance [] [] [] [] [] [x] [] [] [] [] [x]    Distance   25 feet    DME Gait Belt and Rolling Walker    Gait Quality Decreased rolo , Decreased step clearance, and Decreased step length    Weightbearing Status      Stairs      I=Independent, Mod I=Modified Independent, S=Supervision, SBA=Standby Assistance, CGA=Contact Guard Assistance,   Min=Minimal Assistance, Mod=Moderate Assistance, Max=Maximal Assistance, Total=Total Assistance, NT=Not Tested    PLAN:   FREQUENCY AND DURATION: 3 times/week for duration of hospital stay or until stated goals are met, whichever comes first.    TREATMENT:   TREATMENT:   Co-Treatment PT/OT necessary due to patient's decreased overall endurance/tolerance levels, as well as need for high level skilled assistance to complete functional transfers/mobility and functional tasks  Therapeutic Activity (26 Minutes): Therapeutic activity included Supine to Sit, Scooting, Transfer Training, Ambulation on level ground, Sitting balance , and Standing balance to improve functional Activity tolerance, Balance, Mobility, and Strength.    TREATMENT GRID:  N/A    DATE: 12/2/24       Ambulation        Hip Flexion 2x10 AB       Long Arc Quads 2x10 AB       Hip ab/ad        Heel Raises X20 AB       Toe Raises X20 AB                                Key:  A=active, AA=active assisted, P=passive, B=bilaterally, R=right, L=left   DF=dorsiflexion, PF=plantarflexion      AFTER TREATMENT PRECAUTIONS: Alarm Activated, Bed/Chair Locked, Call light within reach, Chair, and Needs within reach    INTERDISCIPLINARY COLLABORATION:  RN/ PCT and OT/ RODRIGUEZ    EDUCATION: Education Given To: Patient  Education Provided: Transfer Training;Fall Prevention Strategies  Education Method: Verbal  Barriers to Learning: None  Education Outcome: Verbalized understanding;Continued education needed    TIME IN/OUT:  Time In: 1415  Time Out: 1441  Minutes:

## 2024-12-03 NOTE — PROGRESS NOTES
IP Consult to Vascular Access Team  Consult performed by: Jorje Leblanc, JUAN MIGUEL  Consult ordered by: Maria T Mendez APRN - CNP       US Guided PIV access-    Ultrasound was used to find the vein which was compressible and without any ultrasound features of an artery or nerve bundle. Skin was cleaned and disinfected prior to IV puncture.  Under real-time ultrasound guidance peripheral access was obtained in the right forearm using 22 G 1.75\" Peripheral IV catheter after 1 attempt(s). Blood return was present and IV flushed without difficulty with no clinical signs of infiltration. IV dressing applied and no immediate complications noted. Patient tolerated the procedure well.

## 2024-12-04 NOTE — CARE COORDINATION
CUBA followed up with Amina at [facility] with several messages regarding patient returning for STR. Amina reported the DON had not gotten back with her.   Last message, no bed available until Mon.   CM informed patient and sent text to son, Jg for another facility.   Jg requested [facility]. CM sent referral.

## 2024-12-04 NOTE — PROGRESS NOTES
[facility] CARDIOLOGY PROGRESS NOTE           12/4/2024 9:46 AM    Admit Date: 11/23/2024      Subjective:   -Telemetry overnight shows sinus rhythm heart rates in the 70s range.  Weight charted at 99.3 kg - 218 pounds-bed scale  Intake versus output-40 cc-balanced  -He feels that his breathing is better but is still back to baseline.  -Complaining of a cough with thick sputum production and/mucus.  Says he is already trying Mucinex.  His oxygen could be aggravating and drying out his nasal secretions.        ROS:  Cardiovascular:  As noted above    Objective:      Vitals:    12/03/24 2024 12/04/24 0001 12/04/24 0426 12/04/24 0731   BP: 112/79 122/76 120/65 103/61   Pulse: 81  77 73   Resp: 20 18 20    Temp: 97.5 °F (36.4 °C) 97.5 °F (36.4 °C) 97.7 °F (36.5 °C) 97.5 °F (36.4 °C)   TempSrc: Oral      SpO2: 100% 95% 100% 99%   Weight:   99.3 kg (218 lb 14.7 oz)    Height:           Physical Exam:  General-No Acute Distress  Neck- supple, no JVD  CV- regular rate and rhythm no MRG  Lung- clear bilaterally  Abd- soft, nontender, nondistended  Ext-1+ bilateral pitting ankle edema extending up to both knees.  Skin- warm and dry    Data Review:     Lab Results   Component Value Date/Time     12/04/2024 05:14 AM    K 3.9 12/04/2024 05:14 AM    CL 94 12/04/2024 05:14 AM    CO2 30 12/04/2024 05:14 AM    BUN 26 12/04/2024 05:14 AM    CREATININE 1.31 12/04/2024 05:14 AM    GLUCOSE 120 12/04/2024 05:14 AM    CALCIUM 9.2 12/04/2024 05:14 AM         Lab Results   Component Value Date    WBC 6.8 12/04/2024    HGB 11.0 (L) 12/04/2024    HCT 34.0 (L) 12/04/2024    MCV 99.7 12/04/2024     12/04/2024        10/29/24    ECHO (TTE) COMPLETE (PRN CONTRAST/BUBBLE/STRAIN/3D) 10/30/2024  4:15 PM (Final)    Interpretation Summary    Left Ventricle: Severely reduced left ventricular systolic function with a visually estimated EF of 25 - 30%. Left ventricle is mildly dilated. Normal wall thickness. Severe  global hypokinesis present. Indeterminate diastolic function due to arrhythmia.    Right Ventricle: Moderately reduced systolic function.    Mitral Valve: Mild regurgitation.    Tricuspid Valve: Mild regurgitation. Mildly elevated RVSP, consistent with mild pulmonary hypertension. The estimated RVSP is 43 mmHg.    Left Atrium: Left atrium is mildly dilated. Systolic blunting in the pulmonary veins.    IVC/SVC:  IVC is dilated.    Image quality is suboptimal. Contrast used: Definity. Procedure performed with the patient in a supine position.    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 *     Principal Problem:    Acute on chronic systolic (congestive) heart failure (HCC)    Ischemic cardiomyopathy  -Echo images independently reviewed suggestive of biventricular failure and concerns for end-stage cardiomyopathy  -Previously, low BPs has limited therapy for left ventricular dysfunction.  Currently on low-dose Toprol-XL/Jardiance.  Attempted low-dose MRA.  Consideration for ACE inhibitor/ARB as BPs tolerate but low BPs currently limited titration/add-on therapy.  -Has been transitioned to p.o. Bumex.  Closely monitor renal function; some mild acute on chronic renal failure but creatinine overall improved to around 1.3; some mild worsening noted and decreased  Bumex dosing to 1 mg twice daily from current 2 mg twice daily.  Albumin level at 3.0.    -No further room to titrate therapy for left ventricular dysfunction.  Currently being evaluated for rehab; was also evaluated for hospice per patient request.  Also asked about short-term rehab; discussed consideration for short-term rehab and if no improvement over time, could consider transitioning to hospice.  Previously asked about hospice consideration due to difficulty with his wife taking care of him and progressive issues with heart failure     Active Problems:    History of DVT (deep vein thrombosis)  -Maintained on Eliquis; hemoglobin relatively stable at around 11.6       Chronic renal disease, stage 3, moderately decreased glomerular filtration rate between 30-59 mL/min/1.73 square meter (HCC)  -Closely monitor; see above       Coronary artery disease of bypass graft of native heart with stable angina pectoris ([facility])  -Reviewed coronary angiogram from [facility] from 5/2024; only patent graft LIMA to distal LAD; has ostial left main which leads to a diagonal felt to be a chronic lesion.  RCA/left circumflex occluded.  -Continue plan medical therapy.  On Ranexa       Hyperlipidemia  -Prior statin intolerance; on Repatha in the outpatient setting       Encounter for palliative care  -Agree with the evaluation for palliative care       Edema  -Improved; closely monitor.     Disposition-states unable to go home as his wife cannot take care of him.  Being evaluated for hospice options.  See above with consideration for short-term rehab as well.  Discussed with case management.  Likely plans for short-term rehab on 12/5/2024-appreciate case management.    Plan today: Awaiting placement, incentive spirometry to help with thick secretions.  Overall, since he was admitted, volume status is clearly better.    Hood Green MD  12/4/2024 9:46 AM

## 2024-12-05 NOTE — PROGRESS NOTES
Nutrition Assessment  Assessment Type: Reassess  Reason for visit:  Best Practice Alert for Pressure Injury Stage 2 or greater  Malnutrition Screening Tool Score: 1    Nutrition Intervention:   Food and/or Nutrient Delivery:   Meals and Snacks:  Diet: Continue current order  Medical Food Supplements:   Medical food supplement therapy:  Continue Glucerna Nutrition Shake (diabetic oral supplement) 220 calories, 10 grams protein per 8 ounce serving      Malnutrition Assessment:  Malnutrition Status: At risk for malnutrition (decreased appetite/intakes, wounds)  no charlotte wasting  Nutrition Focused Physical Exam: Unremarkable     Nutrition Assessment:  Food/Nutrition Related History:   Patient reports that his appetite has decreased over the last year, and endorses eating 50% less than usual as a result. Blames his chronic illnesses. States that with the fluid retention, he feels full after minimal intakes. Doesn't follow a specific diet at baseline - loves seafood & chinese food. He eats 2x/day on average (late breakfast & dinner). Reports having to sit up really well so he doesn't \"get strangled\" on his foods due to \"respiratory issues\".      Do You Have Any Cultural, Mormon, or Ethnic Food Preferences?: No     Weight History:   EMR weight history review: 238# 11/1/23 (Oncology), 240# 1/2/24 (FOV), 240# 3/12/24 (Vascular Surgery), 236# 4/11/24 (Vascular Surgery), 229# 5/15/24 (FOV), 223# 6/5/24 (FOV), 224# 7/10/24 (FOV), 225# 8/1/24 (Oncology), 227# 10/17/24 (Vascular Surgery).   States his weight has been trending down. Bedscale weight obtained today by RN is 230# which is consistent with previous weights.     Nutrition Background:   Wound Type: Stage II (to BL buttocks)   PMH significant for CHF, CAD, ischemic cardiomyopathy, CABG, NSTEMI, CKD 3, prostate cancer w/ mets to the bones, arthritis, GERD, BLAIR, HTN, HLD, pre-DM.   Patient presents with worsening SOB. Is admitted with acute on chronic heart failure.  assessment  Type of Goal: Continue current goal    Discharge Planning:    Continue Oral Nutrition Supplement    Chana Cardoza, STEPHANIE

## 2024-12-05 NOTE — CARE COORDINATION
CUBA contacted by patient's son, Jg, that he met with [facility] rep this AM and was told bed would be available on 12/6. CUBA followed up with Amina for admission Amina reports she heard this conversation but that all the bed available on 12/6 are assigned.  CM asked family to choose another facility.[facility] was chosen. CUBA sent referral and messaged Kassandra at [facility] to inform her of referral

## 2024-12-05 NOTE — PROGRESS NOTES
ACUTE PHYSICAL THERAPY GOALS:   (Developed with and agreed upon by patient and/or caregiver.)  (1.) Matias Mueller  will move from supine to sit and sit to supine , scoot up and down, and roll side to side with CONTACT GUARD ASSIST within 7 treatment day(s).    (2.) Matias Mueller will transfer from bed to chair and chair to bed with SUPERVISION using the least restrictive device within 7 treatment day(s).    (3.) Matias Mueller will ambulate with SUPERVISION for 50 feet with the least restrictive device within 7 treatment day(s).   (4.) Matias Mueller will perform standing static and dynamic balance activities x 5 minutes with SUPERVISION to improve safety within 7 treatment day(s).  (5.) Matias Mueller will propel wheelchair > 100 feet to assist with mobility/increase endurance on level surfaces within 7 treatment day(s).  (6.) Matias Mueller will perform therapeutic exercises x 15 min for HEP with SUPERVISION to improve strength, endurance, and functional mobility within 7 treatment day(s).      PHYSICAL THERAPY: Daily Note PM   (Link to Caseload Tracking: PT Visit Days : 1  Time In/Out PT Charge Capture  Rehab Caseload Tracker  Orders    Matias Mueller is a 79 y.o. male   PRIMARY DIAGNOSIS: Acute on chronic systolic (congestive) heart failure (HCC)  Acute on chronic systolic (congestive) heart failure (HCC) [I50.23]       Inpatient: Payor: MEDICARE / Plan: MEDICARE PART A AND B / Product Type: *No Product type* /     ASSESSMENT:     REHAB RECOMMENDATIONS:   Recommendation to date pending progress:  Setting:  Short-term Rehab    Equipment:    To Be Determined     ASSESSMENT:  Mr. Mueller is up in the chair on arrival.  He wants to walk but asked that his purewick remain to suction.  Sit to stand with mod assist.  Gait with rolling walker and cg 25 ft.  Once back in the chair had purewick leaking issues.  RN came to assist.  Performed therex in chair x 30 reps.  He is making slow

## 2024-12-05 NOTE — PROGRESS NOTES
ACUTE OCCUPATIONAL THERAPY GOALS:   (Developed with and agreed upon by patient and/or caregiver.)  1. Pt will toilet with CGA   2. Pt will complete functional mobility for ADLs with CGA using AD as needed  3. Pt will complete lower body dressing with CGA using AE as needed  4. Pt will complete grooming and hygiene at sink with CGA  5. Pt will tolerate 23 minutes functional activity with min or fewer rest breaks to promote increased endurance for ADLs        Timeframe: 7 visits    OCCUPATIONAL THERAPY: Daily Note PM   OT Visit Days: 4   Time In/Out  OT Charge Capture  Rehab Caseload Tracker  OT Orders    Matias Mueller is a 79 y.o. male   PRIMARY DIAGNOSIS: Acute on chronic systolic (congestive) heart failure (HCC)  Acute on chronic systolic (congestive) heart failure (HCC) [I50.23]       Inpatient: Payor: MEDICARE / Plan: MEDICARE PART A AND B / Product Type: *No Product type* /     ASSESSMENT:     REHAB RECOMMENDATIONS:   Recommendation to date pending progress:  Setting:  Short-term Rehab    Equipment:    To Be Determined     ASSESSMENT:  Mr. Mueller remains limited by deficits in mobility, strength, and activity tolerance impacting ADLs. Pt stood at sink to wash hands and face with CGA, then completed full body bathing and dressing, shaving, and hair washing seated in chair. Max A for LB bathing for perineal and to reach distally. Max A for bed mobility and to stand, once up CGA w/ rolling walker. Pt continues to fatigue with activity and required several rest breaks to tolerate. Pt demonstrates good progress towards goals, continue POC.        SUBJECTIVE:     Mr. Mueller states, \"I need to rest for a minute.\"     Social/Functional Lives With: Spouse  Type of Home: House  Home Layout: One level  Home Access: Stairs to enter with rails  Entrance Stairs - Number of Steps: 1  Home Equipment: Rollator  Receives Help From: Family  Prior Level of Assist for ADLs: Needs assistance  Prior Level of Assist for

## 2024-12-05 NOTE — PROGRESS NOTES
[facility] CARDIOLOGY PROGRESS NOTE         12/5/2024 9:16 AM    Admit Date: 11/23/2024    Subjective:   Patient reports constipation.  Denies chest pain or chest pressure or racing heart, dizziness lightheadedness shortness of breath complaints.  Some leg swelling remains.    ROS:  Cardiovascular:  As noted above    Objective:      Vitals:    12/04/24 2356 12/05/24 0400 12/05/24 0440 12/05/24 0736   BP: 114/76  90/66 (!) 107/57   Pulse: 66 64 69 71   Resp: 18  22 16   Temp: 97.5 °F (36.4 °C)  97.5 °F (36.4 °C) 97.3 °F (36.3 °C)   TempSrc: Oral  Oral Oral   SpO2: 93% 95% 98% 100%   Weight:   100.7 kg (222 lb)    Height:         Physical Exam:  General-No Acute Distress, sitting comfortably in bed, awake, alert, interactive  Neck- supple, + JVD  CV- regular rate and rhythm no MRG  Lung-no wheezes bilaterally, decreased lower fields.  Abd- soft, nontender, nondistended  Ext-1+ edema bilaterally.  Skin- warm and dry    Data Review:   Recent Labs     12/03/24  0615 12/04/24  0514    135*   K 4.0 3.9   BUN 29* 26*   WBC 7.2 6.8   HGB 11.0* 11.0*   HCT 34.4* 34.0*    247       Assessment/Plan:     Active Hospital Problems    Acute on chronic systolic (congestive) heart failure (HCC)    Ischemic cardiomyopathy  -Seems to be trending towards end-stage heart failure, has been seen by hospice.  - BP is limited additional CHF medical therapy  - Continue to closely monitor hemodynamics while inpatient, no evidence of cardiogenic shock on exam.  - At this time continue Toprol-XL 25 twice daily can convert to once daily dosing as pt approaches discharge, spironolactone 12.5, Jardiance 10, Bumex 1 mg p.o. twice daily.      Chronic renal disease, stage 3, moderately decreased glomerular filtration rate between 30-59 mL/min/1.73 square meter (HCC)  -Closely monitor renal function given indication for diuretics as above.      History of DVT (deep vein thrombosis)  -On Eliquis 5 mg p.o. twice

## 2024-12-06 NOTE — CARE COORDINATION
CM inquired if bed was available today at [facility]. Kassandra reports patient can admit on 12/7. No Covid screen required. CM informed patient.  [facility] will call list.

## 2024-12-06 NOTE — PROGRESS NOTES
[facility] CARDIOLOGY PROGRESS NOTE         12/6/2024 11:02 AM    Admit Date: 11/23/2024    Subjective:   Patient reports constipation.  Denies chest pain or chest pressure or racing heart, dizziness lightheadedness shortness of breath complaints.  Leg swelling remains.    ROS:  Cardiovascular:  As noted above    Objective:      Vitals:    12/05/24 2319 12/06/24 0527 12/06/24 0741 12/06/24 0743   BP: 109/62 (!) 106/59 120/69    Pulse: 76 72 75 74   Resp: 18 18 17 17   Temp: 97.7 °F (36.5 °C) 98.1 °F (36.7 °C) 97.5 °F (36.4 °C)    TempSrc: Oral Oral Oral    SpO2: 90% 97% 97% 95%   Weight:  99.3 kg (219 lb)     Height:         Physical Exam:  General-No Acute Distress, sitting comfortably in bed, awake, alert, interactive  Neck- supple, + JVD  CV- regular rate and rhythm no MRG  Lung-no wheezes bilaterally, decreased lower fields.  Abd- soft, nontender, nondistended  Ext-1-2+ edema bilaterally.  Skin- warm and dry    Data Review:   Recent Labs     12/04/24  0514   *   K 3.9   BUN 26*   WBC 6.8   HGB 11.0*   HCT 34.0*          Assessment/Plan:     Active Hospital Problems    Acute on chronic systolic (congestive) heart failure (HCC)    Ischemic cardiomyopathy  -Seems to be trending towards end-stage heart failure, has been seen by hospice.  - BP is limited additional CHF medical therapy  - Continue to closely monitor hemodynamics while inpatient, no evidence of cardiogenic shock on exam.  - At this time continue Toprol-XL 25 twice daily can convert to once daily dosing as pt approaches discharge, spironolactone 12.5, Jardiance 10, Bumex 1 mg p.o. twice daily.      Chronic renal disease, stage 3, moderately decreased glomerular filtration rate between 30-59 mL/min/1.73 square meter (HCC)  -Closely monitor renal function given indication for diuretics as above.      History of DVT (deep vein thrombosis)  -On Eliquis 5 mg p.o. twice daily      Carotid stenosis, right  -Continue aspirin,  Repatha.      Obesity (BMI 30-39.9)  - continue efforts towards dietary changes in an effort to lose weight      HTN (hypertension)  -Controlled at this time to low, closely monitor hemodynamics adjust medicines as needed while inpatient.      Coronary artery disease of bypass graft of native heart with stable angina pectoris (HCC)  -No angina at this time, continue ASA, Repatha, Toprol.      Hyperlipidemia  -On Repatha as outpatient      Dyspnea    Debility    Fatigue    Encounter for palliative care  -Appreciate help from palliative care/hospice service.    Planned discharge tomorrow.    Rudi Cloud III, MD  12/6/2024 11:02 AM

## 2024-12-06 NOTE — PROGRESS NOTES
ACUTE PHYSICAL THERAPY GOALS:   (Developed with and agreed upon by patient and/or caregiver.)  (1.) Matias Mueller  will move from supine to sit and sit to supine , scoot up and down, and roll side to side with CONTACT GUARD ASSIST within 7 treatment day(s).    (2.) Matias Mueller will transfer from bed to chair and chair to bed with SUPERVISION using the least restrictive device within 7 treatment day(s).    (3.) Matias Mueller will ambulate with SUPERVISION for 50 feet with the least restrictive device within 7 treatment day(s).   (4.) Matias Mueller will perform standing static and dynamic balance activities x 5 minutes with SUPERVISION to improve safety within 7 treatment day(s).  (5.) Matias Mueller will propel wheelchair > 100 feet to assist with mobility/increase endurance on level surfaces within 7 treatment day(s).  (6.) Matias Mueller will perform therapeutic exercises x 15 min for HEP with SUPERVISION to improve strength, endurance, and functional mobility within 7 treatment day(s).      PHYSICAL THERAPY: Daily Note PM   (Link to Caseload Tracking: PT Visit Days : 2  Time In/Out PT Charge Capture  Rehab Caseload Tracker  Orders    Matias Mueller is a 79 y.o. male   PRIMARY DIAGNOSIS: Acute on chronic systolic (congestive) heart failure (HCC)  Acute on chronic systolic (congestive) heart failure (HCC) [I50.23]       Inpatient: Payor: MEDICARE / Plan: MEDICARE PART A AND B / Product Type: *No Product type* /     ASSESSMENT:     REHAB RECOMMENDATIONS:   Recommendation to date pending progress:  Setting:  Short-term Rehab    Equipment:    To Be Determined     ASSESSMENT:  Mr. Mueller was supine upon contact and agreeable to PT. Patient performed supine to sit with mod assist, additional time, and cues for technique. Patient then transferred to standing with min-mod assist and cues for technique/hand placement. Once standing patient ambulated slowly around his room x 50' with  []      GAIT: I Mod I S SBA CGA Min Mod Max Total  NT x2 Comments:   Level of Assistance [] [] [] [] [x] [x] [] [] [] [] []    Distance   50 feet    DME Gait Belt and Rolling Walker    Gait Quality Decreased rolo , Decreased step clearance, and Decreased step length    Weightbearing Status      Stairs      I=Independent, Mod I=Modified Independent, S=Supervision, SBA=Standby Assistance, CGA=Contact Guard Assistance,   Min=Minimal Assistance, Mod=Moderate Assistance, Max=Maximal Assistance, Total=Total Assistance, NT=Not Tested    PLAN:   FREQUENCY AND DURATION: 3 times/week for duration of hospital stay or until stated goals are met, whichever comes first.    TREATMENT:   TREATMENT:   Therapeutic Activity (24 Minutes): Therapeutic activity included Supine to Sit, Scooting, Transfer Training, Ambulation on level ground, Sitting balance , and Standing balance to improve functional Activity tolerance, Balance, Mobility, and Strength.    TREATMENT GRID:  N/A    DATE: 12/2/24 12/5      Ambulation        Hip Flexion 2x10 AB 3 x 10       Long Arc Quads 2x10 AB 3 x 10      Hip ab/ad        Heel Raises X20 AB 3 x 10      Toe Raises X20 AB 3 x 10      Adductor squeezes  15      Glut sets  15               Key:  A=active, AA=active assisted, P=passive, B=bilaterally, R=right, L=left   DF=dorsiflexion, PF=plantarflexion      AFTER TREATMENT PRECAUTIONS: Alarm Activated, Bed/Chair Locked, Call light within reach, Chair, and Needs within reach    INTERDISCIPLINARY COLLABORATION:  RN/ PCT and PT/ PTA    EDUCATION:      TIME IN/OUT:  Time In: 1006  Time Out: 1030  Minutes: 24    Johanna Astorga PTA

## 2024-12-07 PROBLEM — I50.23 ACUTE ON CHRONIC SYSTOLIC (CONGESTIVE) HEART FAILURE (HCC): Status: RESOLVED | Noted: 2024-11-23 | Resolved: 2024-12-07

## 2024-12-07 NOTE — PROGRESS NOTES
TRANSFER - OUT REPORT:    Verbal report given to Grecia BULLARD on Matias Mueller  being transferred to [facility] for routine progression of patient care       Report consisted of patient's Situation, Background, Assessment and   Recommendations(SBAR).     Information from the following report(s) Nurse Handoff Report, Adult Overview, Recent Results, and Neuro Assessment was reviewed with the receiving nurse.           Lines:       Opportunity for questions and clarification was provided.      Patient transported with: [employer]  O2 @ 2lpm

## 2024-12-07 NOTE — DISCHARGE SUMMARY
[facility] Cardiology Discharge Summary     Patient ID:  Matias Mueller  [id number]  79 y.o.  1945    Admit date: 11/23/2024    Discharge date and time:  12/7/2024    Admitting Physician: Hood Coello MD     Discharge Physician: Dr. Taylor    Admission Diagnoses: Acute on chronic systolic (congestive) heart failure (HCC) [I50.23]    Discharge Diagnoses:   Patient Active Problem List    Diagnosis Date Noted    NSTEMI (non-ST elevated myocardial infarction) ([facility]) 10/29/2024    Chronic renal disease, stage 3, moderately decreased glomerular filtration rate between 30-59 mL/min/1.73 square meter ([facility]) 10/29/2024    Chronic systolic (congestive) heart failure ([facility]) 10/29/2024    History of DVT (deep vein thrombosis) 02/08/2023    Chest pain 02/20/2019    Ischemic cardiomyopathy 11/23/2024    Severe obesity 05/19/2020    Carotid stenosis, right 03/02/2017    Obesity (BMI 30-39.9) 10/14/2016    H/O carotid endarterectomy 07/27/2016    Carotid stenosis, bilateral 07/27/2016    Status post coronary artery bypass graft 03/26/2010    Coronary artery disease of bypass graft of native heart with stable angina pectoris (HCC) 03/25/2010    HTN (hypertension) 03/25/2010    Hyperlipidemia 03/25/2010       Cardiology Procedures this admission:   none  Consults: palliative care    Hospital Course: Patient presented to [facility] ED with complaints of SOB, orthopnea worsening for several days. He has pmhx of HFrEF (EF 25-30% on echo 10/30/2024), htn, hld, obesity, abdominal cellulitis, PVD/lymph edema, h/o DVT (eliquis), carotid stenosis (R endarterectomy), stage 4 metastatic prostate cancer (bony mets to pelvis), CAD s/p CABG w STEMI 5/16/24 w [facility] ([facility]) w unsuccessful attempt at dilating the L main ostial LAD to diagonal, patent LIMA to LAD, circ and RCA occluded w collaterals. Workup in ED revealed:  CXR with pulmonary congestion and bilateral pleural effusions. In the ED, he was treated with IVP lasix 20 mg and  nitro paste[place] [address]. Cardiology was consulted and admitted the pt for CHFE.     Pt was started on IV diuresis with 40 IV Lasix BID then 60 IV BID. Echo was not repeated as was recently performed. He was started on Jardiance, Aldactone and Lopressor adjusted to Toprol. I&Os closely monitored and pt had good response to IV diuresis and was able to be changed to PO Bumex BID on 11/30/2024 and renal fxn closely monitored. Palliative care was consulted and assisted with [facility] discussions and hospice was discussed but the plan was ultimately to return to [facility].    Pt had good urine output and was net negative 4.5L throughout his stay. CM was consulted assisting with placement option. The day of dc Pt was up feeling well without any complaints of CP, SOB, palpitations or LE swelling. Pt's labs were WNL. Pt was seen and examined by Dr. Taylor and determined stable and ready for discharge. Pt was instructed on the importance of weighing self daily. If Pt gains more than 2 lbs overnight or 5 lbs in one week, Pt is instructed to call [facility] Cardiology at [phone]. For maximized medical therapy of congestive heart failure, patient will continue use of BB and was dc'd on Aldactone and Jardiance as was taken in hospital.  The patient has been scheduled for 7 day transitional care follow up with [facility] Cardiology -- Dr. Boyer, office will call with appointment. Pt was given lab slip for a BMP/Mag in one week. See hospital notes for complete hospital course.     DISPOSITION: The patient is being discharged home in stable condition on a low saturated fat, low cholesterol and low salt diet. Pt is instructed to follow a fluid restricted diet with no more than 2 liters per day. Pt is instructed to advance activities as tolerated limited to fatigue or shortness of breath. Pt is instructed to call office or return to ER for immediate evaluation of any shortness of breath or chest pain.     Discharge Exam: BP  109/67   Pulse 84   Temp 97.7 °F (36.5 °C) (Oral)   Resp 16   Ht 1.727 m (5' 7.99\")   Wt 99.3 kg (219 lb)   SpO2 92%   BMI 33.31 kg/m²   Pt has been seen by Dr. Taylor: see his progress note for exam details.    No results found for this or any previous visit (from the past 24 hour(s)).      Patient Instructions:   Current Discharge Medication List        START taking these medications    Details   empagliflozin (JARDIANCE) 10 MG tablet Take 1 tablet by mouth daily  Qty: 30 tablet, Refills: 3      metoprolol succinate (TOPROL XL) 50 MG extended release tablet Take 1 tablet by mouth daily  Qty: 30 tablet, Refills: 3      bumetanide (BUMEX) 1 MG tablet Take 1 tablet by mouth in the morning and 1 tablet in the evening.  Qty: 30 tablet, Refills: 3      spironolactone (ALDACTONE) 25 MG tablet Take 0.5 tablets by mouth daily  Qty: 30 tablet, Refills: 3           CONTINUE these medications which have NOT CHANGED    Details   Sulfamethoxazole-Trimethoprim (BACTRIM PO) Take by mouth      aspirin 81 MG chewable tablet Take 1 tablet by mouth daily  Qty: 90 tablet, Refills: 3      nitroGLYCERIN (NITROSTAT) 0.4 MG SL tablet Place 1 tablet under the tongue every 5 minutes as needed for Chest pain  Qty: 25 tablet, Refills: 3      ranolazine (RANEXA) 500 MG extended release tablet Take 1 tablet by mouth 2 times daily  Qty: 180 tablet, Refills: 3      apixaban (ELIQUIS) 5 MG TABS tablet Take 1 tablet by mouth 2 times daily  Qty: 60 tablet, Refills: 3      fluticasone (FLONASE) 50 MCG/ACT nasal spray 2 sprays by Nasal route daily      Evolocumab 140 MG/ML SOAJ Inject 140 mg into the skin every 14 days  Qty: 6 Adjustable Dose Pre-filled Pen Syringe, Refills: 3           STOP taking these medications       furosemide (LASIX) 20 MG tablet Comments:   Reason for Stopping:         metoprolol tartrate (LOPRESSOR) 50 MG tablet Comments:   Reason for Stopping:                  Signed:  Gonzalo Teresa PA-C  12/7/2024  11:16

## 2024-12-07 NOTE — CARE COORDINATION
Discharge order is in. Pt is discharging and transferring to [id number]. [employer] scheduled for 1300. Call for report ([phone]). CM provided pt and family room assignment and transport time. No other discharge needs identified by CM. Tx goals met.       12/07/24 1133   Services At/After Discharge   Transition of Care Consult (CM Consult) Discharge Planning;SNF  ([id number])   Partner SNF Yes   Services At/After Discharge Skilled Nursing Facility (SNF)   [place] Resource Information Provided? No   Mode of Transport at Discharge [facility]   Hospital Transport Time of Discharge 1300   Confirm Follow Up Transport Family   Condition of Participation: Discharge Planning   The Patient and/or Patient Representative was provided with a Choice of Provider? Patient   The Patient and/Or Patient Representative agree with the Discharge Plan? Yes   Freedom of Choice list was provided with basic dialogue that supports the patient's individualized plan of care/goals, treatment preferences, and shares the quality data associated with the providers?  Yes

## 2024-12-07 NOTE — PLAN OF CARE
Problem: Chronic Conditions and Co-morbidities  Goal: Patient's chronic conditions and co-morbidity symptoms are monitored and maintained or improved  12/7/2024 1031 by Mirian Cruz RN  Outcome: Progressing  Flowsheets (Taken 12/7/2024 0900)  Care Plan - Patient's Chronic Conditions and Co-Morbidity Symptoms are Monitored and Maintained or Improved:   Monitor and assess patient's chronic conditions and comorbid symptoms for stability, deterioration, or improvement   Collaborate with multidisciplinary team to address chronic and comorbid conditions and prevent exacerbation or deterioration   Update acute care plan with appropriate goals if chronic or comorbid symptoms are exacerbated and prevent overall improvement and discharge  12/7/2024 0020 by Vi Moreira RN  Outcome: Progressing     Problem: Discharge Planning  Goal: Discharge to home or other facility with appropriate resources  12/7/2024 1031 by Mirian Cruz RN  Outcome: Progressing  Flowsheets (Taken 12/7/2024 0900)  Discharge to home or other facility with appropriate resources:   Identify barriers to discharge with patient and caregiver   Arrange for needed discharge resources and transportation as appropriate   Refer to discharge planning if patient needs post-hospital services based on physician order or complex needs related to functional status, cognitive ability or social support system   Identify discharge learning needs (meds, wound care, etc)  12/7/2024 0020 by Vi Moreira RN  Outcome: Progressing     Problem: Pain  Goal: Verbalizes/displays adequate comfort level or baseline comfort level  12/7/2024 1031 by Mirian Cruz RN  Outcome: Progressing  12/7/2024 0020 by Vi Moreira RN  Outcome: Progressing     Problem: Skin/Tissue Integrity  Goal: Absence of new skin breakdown  Description: 1.  Monitor for areas of redness and/or skin breakdown  2.  Assess vascular access sites hourly  3.  Every 4-6 hours minimum:

## 2024-12-09 ENCOUNTER — TELEPHONE (OUTPATIENT)
Age: 79
End: 2024-12-09

## 2024-12-09 NOTE — TELEPHONE ENCOUNTER
Wife called stating that she does not need referral to new facility. Will keep appointment as scheduled//brendab

## 2024-12-09 NOTE — PROGRESS NOTES
Physician Progress Note      PATIENT:               MARC MARTINEZ  [facility] #:                  [id number]  :                       1945  ADMIT DATE:       2024 8:47 AM  DISCH DATE:        2024 1:19 PM  RESPONDING  PROVIDER #:        Hood Green MD          QUERY TEXT:    Patient admitted with Acute on chronic systolic congestive heart failure.  Per   wound care RN on 24 in flow sheet-\"stage 2 pressure wound.\" On ,   Dietician noted \"Increased nutrient needs related to increase demand for   energy/nutrients as evidenced by wounds\". If possible, please document in   progress notes and discharge summary the location, present on admission status   and stage of the pressure ulcer:    The medical record reflects the following:  Risk Factors: Acute on chronic systolic congestive heart failure, significant   edema with anasarca, obesity  Clinical Indicators: Wound care: Scars and blanchable erythema defined areas   each buttock and coccyx. Right is 3x3 and left is 4x5cm. Pressure St 2  Treatment: Wound care consult, Dietician consult, silicone border dressing,   frequent turn and repositioning, foam cushion when up to chair    Stage 1:  Non-blanchable erythema of intact skin  Stage 2:  Abrasion, Blister, Partial-thickness skin loss, with exposed dermis  Stage 3:  Full-thickness skin loss with damage or necrosis of subcutaneous   tissue  Stage 4:  Full-thickness skin & soft tissue loss through to underlying muscle,   tendon or bone  Unstageable: Obscured full-thickness skin & tissue loss  Options provided:  -- Stage 2 Pressure Ulcer of bilateral buttocks present on admission  -- Other - I will add my own diagnosis  -- Disagree - Not applicable / Not valid  -- Disagree - Clinically unable to determine / Unknown  -- Refer to Clinical Documentation Reviewer    PROVIDER RESPONSE TEXT:    Provider is clinically unable to determine a response to this query.  I saw this patient only twice

## 2024-12-09 NOTE — TELEPHONE ENCOUNTER
I spoke with wife today. Patient is at Rehab in May. Wife mentioned she would like for pt to be referred to [facility]. There phone number is [phone] and address is [address] [place] [address]. She requested if  would send over a referral to that office. Patient is scheduled for 12/19/24 for an office visit.

## 2024-12-16 NOTE — PROGRESS NOTES
Physician Progress Note      PATIENT:               MARC MARTINEZ  [facility] #:                  [id number]  :                       1945  ADMIT DATE:       2024 8:47 AM  DISCH DATE:        2024 1:19 PM  RESPONDING  PROVIDER #:        Perico Taylor MD          QUERY TEXT:    Pt admitted with Acute on chronic systolic congestive heart failure. In H/P,   pt noted to also have Ischemic cardiomyopathy as well as significant edema   with anasarca.  If possible, please document in progress notes and discharge   summary the etiology of CHF, if able to be determined.  The medical record reflects the following:    Risk Factors: age 79, hx of HTN, ischemic cardiomyopathy, and CKD. O2 2-3L NC   baseline  Clinical Indicators  - NT Pro-BNP 39,096, Trop T- 189-152, creat 1.29-1.34-1.38-1.51  -Echo 10/24/24:  EF of 25 - 30%. Left ventricle is mildly dilated. Normal wall   thickness. Severe global hypokinesis present.  -Echo 10/29/24: Left Ventricle: Severely reduced left ventricular systolic   function with a visually estimated EF of 25 - 30%  -CXR-Cardiomegaly with pulmonary vascular congestion  -RR 16-23, spo2 99% on 3L-weaned to room air 99%spo2    Treatment: IV lasix , labs, imaging, supplemental O2  Options provided:  -- CHF due to Hypertensive Heart Disease  -- CHF due to Ischemic heart disease  -- CHF due to both hypertensive heart disease and ischemic heart disease  -- Other - I will add my own diagnosis  -- Disagree - Not applicable / Not valid  -- Disagree - Clinically unable to determine / Unknown  -- Refer to Clinical Documentation Reviewer    PROVIDER RESPONSE TEXT:    Provider is clinically unable to determine a response to this query.  I did not take care for this patient    Query created by: Lillian Levine on 2024 12:23 PM      Electronically signed by:  Perico Taylor MD 2024 7:17 AM

## 2024-12-18 ENCOUNTER — TELEPHONE (OUTPATIENT)
Age: 79
End: 2024-12-18

## 2024-12-18 NOTE — TELEPHONE ENCOUNTER
Wife called stating that patient is in rehab, refusing to go back to rehab, wife states that Hospice will be coming home in Hospice care today. Will make arrangements to bring patient in at a later time. Will cancel appointment for tomorrow//brendab

## 2024-12-21 NOTE — PROGRESS NOTES
Physician Progress Note      PATIENT:               MARC MARTINEZ  [facility] #:                  [id number]  :                       1945  ADMIT DATE:       2024 8:47 AM  DISCH DATE:        2024 1:19 PM  RESPONDING  PROVIDER #:        CESILIA MCGRAW          QUERY TEXT:    Pt admitted with Acute on chronic systolic congestive heart failure. In H/P,   pt noted to also have Ischemic cardiomyopathy as well as significant edema   with anasarca.  If possible, please document in progress notes and discharge   summary the etiology of CHF, if able to be determined.  The medical record reflects the following:    Risk Factors: age 79, hx of HTN, ischemic cardiomyopathy, and CKD. O2 2-3L NC   baseline  Clinical Indicators  - NT Pro-BNP 39,096, Trop T- 189-152, creat 1.29-1.34-1.38-1.51  -Echo 10/24/24:  EF of 25 - 30%. Left ventricle is mildly dilated. Normal wall   thickness. Severe global hypokinesis present.  -Echo 10/29/24: Left Ventricle: Severely reduced left ventricular systolic   function with a visually estimated EF of 25 - 30%  -CXR-Cardiomegaly with pulmonary vascular congestion  -RR 16-23, spo2 99% on 3L-weaned to room air 99%spo2    Treatment: IV lasix , labs, imaging, supplemental O2  Options provided:  -- CHF due to Hypertensive Heart Disease  -- CHF due to Ischemic heart disease  -- CHF due to both hypertensive heart disease and ischemic heart disease  -- Other - I will add my own diagnosis  -- Disagree - Not applicable / Not valid  -- Disagree - Clinically unable to determine / Unknown  -- Refer to Clinical Documentation Reviewer    PROVIDER RESPONSE TEXT:    Provider disagreed with this query.  I do not assess/manage these wounds    Query created by: Lillian Levine on 2024 7:51 AM      QUERY TEXT:    Patient admitted with Acute on chronic systolic congestive heart failure.  Per   wound care RN on 24 in flow sheet-\"stage 2 pressure wound.\" On ,   Dietician noted

## 2024-12-26 ENCOUNTER — TELEPHONE (OUTPATIENT)
Age: 79
End: 2024-12-26

## 2024-12-26 NOTE — TELEPHONE ENCOUNTER
Spoke with wife, very appreciative of the care that was given by Dr. Boyer.     We regret to inform you that Jg passed away Dec. 24 around 7 am.  His heart just gave out.  Thank you for all you and your staff did for him concerning his heart issues.  Sincerely   Dian Mueller

## (undated) DEVICE — INTENDED TO BE USED TO OCCLUDE, RETRACT AND IDENTIFY ARTERIES, VEINS, TENDONS AND NERVES IN SURGICAL PROCEDURES: Brand: STERION®  VESSEL LOOP

## (undated) DEVICE — INTENDED FOR TISSUE SEPARATION, AND OTHER PROCEDURES THAT REQUIRE A SHARP SURGICAL BLADE TO PUNCTURE OR CUT.: Brand: BARD-PARKER ® STAINLESS STEEL BLADES

## (undated) DEVICE — Device

## (undated) DEVICE — SUTURE PROL SZ 5-0 L24IN NONABSORBABLE BLU L9.3MM BV-1 3/8 9702H

## (undated) DEVICE — DISH PTRI STRL --

## (undated) DEVICE — MAGNETIC DRAPE: Brand: DEVON

## (undated) DEVICE — TAPE UMB 1/8X30IN MP COT WHT --

## (undated) DEVICE — TRAY CATH 16F URIN MTR LTX -- CONVERT TO ITEM 363111

## (undated) DEVICE — SUTURE MCRYL SZ 4-0 L27IN ABSRB UD L19MM PS-2 1/2 CIR PRIM Y426H

## (undated) DEVICE — (D)SYR 10ML 1/5ML GRAD NSAF -- PKGING CHANGE USE ITEM 338027

## (undated) DEVICE — SUT PROL 6-0 30IN C1 DA BLU --

## (undated) DEVICE — CAROTID ARTERY SHUNT KIT,RADIOPAQUE LINE, STRAIGHT: Brand: ARGYLE

## (undated) DEVICE — SUTURE NONABSORBABLE MONOFILAMENT 6-0 BV-1 1X30 IN PROLENE 8709H

## (undated) DEVICE — PVC URETHRAL CATHETER: Brand: DOVER

## (undated) DEVICE — SUTURE PROL SZ 5-0 L24IN NONABSORBABLE BLU L13MM C-1 3/8 M8725

## (undated) DEVICE — SUTURE VCRL SZ 3-0 L27IN ABSRB UD L26MM SH 1/2 CIR J416H

## (undated) DEVICE — SUTURE PROL SZ 6-0 L24IN NONABSORBABLE BLU BV-1 L9.3MM 3/8 M8805

## (undated) DEVICE — AGENT HEMSTAT W4XL4IN OXIDIZED REGENERATED CELOS ABSRB SFT

## (undated) DEVICE — DRAPE TWL SURG 16X26IN BLU ORB04] ALLCARE INC]

## (undated) DEVICE — 3M™ IOBAN™ 2 ANTIMICROBIAL INCISE DRAPE 6650EZ: Brand: IOBAN™ 2

## (undated) DEVICE — UNIVERSAL DRAPES: Brand: MEDLINE INDUSTRIES, INC.

## (undated) DEVICE — SUTURE PERMAHAND SZ 3-0 L18IN NONABSORBABLE BLK SILK BRAID A184H

## (undated) DEVICE — STANDARD HYPODERMIC NEEDLE,POLYPROPYLENE HUB: Brand: MONOJECT

## (undated) DEVICE — WIPE INSTR HIGH ABSORBENT FAST WICKING LINT FREE COUNT 20

## (undated) DEVICE — SUTURE NONABSORBABLE MONOFILAMENT 5-0 C-1 1X24 IN PROLENE 8725H

## (undated) DEVICE — SUTURE PERMAHAND SZ 2-0 L12X18IN NONABSORBABLE BLK SILK A185H

## (undated) DEVICE — BASIC SINGLE BASIN-LF: Brand: MEDLINE INDUSTRIES, INC.

## (undated) DEVICE — DERMABOND SKIN ADH 0.7ML -- DERMABOND ADVANCED 12/BX

## (undated) DEVICE — CARDINAL HEALTH FLEXIBLE LIGHT HANDLE COVER: Brand: CARDINAL HEALTH

## (undated) DEVICE — APPLIER CLP SM BLK TI AUTO HNDL DISP W/ 20 CLP PREM SURGICLP